# Patient Record
Sex: FEMALE | Race: WHITE | Employment: OTHER | URBAN - NONMETROPOLITAN AREA
[De-identification: names, ages, dates, MRNs, and addresses within clinical notes are randomized per-mention and may not be internally consistent; named-entity substitution may affect disease eponyms.]

---

## 2017-01-04 ENCOUNTER — TELEPHONE (OUTPATIENT)
Dept: UROLOGY | Age: 57
End: 2017-01-04

## 2017-01-17 ENCOUNTER — TELEPHONE (OUTPATIENT)
Dept: UROLOGY | Age: 57
End: 2017-01-17

## 2017-06-01 ENCOUNTER — OFFICE VISIT (OUTPATIENT)
Dept: UROLOGY | Age: 57
End: 2017-06-01

## 2017-06-01 VITALS
HEIGHT: 61 IN | BODY MASS INDEX: 25.49 KG/M2 | SYSTOLIC BLOOD PRESSURE: 106 MMHG | WEIGHT: 135 LBS | DIASTOLIC BLOOD PRESSURE: 70 MMHG

## 2017-06-01 DIAGNOSIS — N20.0 KIDNEY STONES: Primary | ICD-10-CM

## 2017-06-01 LAB
BILIRUBIN URINE: NEGATIVE
BLOOD URINE, POC: NORMAL
CHARACTER, URINE: CLEAR
COLOR, URINE: YELLOW
GLUCOSE URINE: NEGATIVE MG/DL
KETONES, URINE: NEGATIVE
LEUKOCYTE CLUMPS, URINE: NEGATIVE
NITRITE, URINE: NEGATIVE
PH, URINE: 6.5
PROTEIN, URINE: NEGATIVE MG/DL
SPECIFIC GRAVITY, URINE: 1.01 (ref 1–1.03)
UROBILINOGEN, URINE: 0.2 EU/DL

## 2017-06-01 PROCEDURE — 99999 EKG 12-LEAD: CPT | Performed by: PHYSICIAN ASSISTANT

## 2017-06-01 PROCEDURE — 99213 OFFICE O/P EST LOW 20 MIN: CPT | Performed by: PHYSICIAN ASSISTANT

## 2017-06-01 PROCEDURE — 81003 URINALYSIS AUTO W/O SCOPE: CPT | Performed by: PHYSICIAN ASSISTANT

## 2017-06-01 PROCEDURE — 99999 POCT URINALYSIS DIPSTICK W/O MICROSCOPE (AUTO): CPT | Performed by: PHYSICIAN ASSISTANT

## 2017-06-01 RX ORDER — CIPROFLOXACIN 500 MG/1
500 TABLET, FILM COATED ORAL 2 TIMES DAILY
COMMUNITY
End: 2020-08-12

## 2017-06-02 LAB
APPEARANCE: CLEAR
BACTERIA: ABNORMAL PER HPF
BILIRUBIN: NEGATIVE
COLOR: YELLOW
EPITHELIAL CELLS: ABNORMAL PER HPF
GLUCOSE BLD-MCNC: NEGATIVE MG/DL
KETONES, URINE: NEGATIVE
LEUKOCYTE ESTERASE, URINE: NEGATIVE
NITRITE, URINE: NEGATIVE
OCCULT BLOOD,URINE: ABNORMAL
PH: 6.5 (ref 5–9)
PROTEIN, URINE: NEGATIVE
RBC: ABNORMAL PER HPF (ref 0–5)
SP GRAVITY MISCELLANEOUS: <1.005 (ref 1–1.03)
UROBILINOGEN, URINE: NORMAL
WBC: ABNORMAL PER HPF (ref 0–5)

## 2017-06-05 ENCOUNTER — TELEPHONE (OUTPATIENT)
Dept: UROLOGY | Age: 57
End: 2017-06-05

## 2017-06-12 ENCOUNTER — PROCEDURE VISIT (OUTPATIENT)
Dept: UROLOGY | Age: 57
End: 2017-06-12

## 2017-06-12 VITALS
HEIGHT: 63 IN | DIASTOLIC BLOOD PRESSURE: 82 MMHG | SYSTOLIC BLOOD PRESSURE: 126 MMHG | WEIGHT: 134 LBS | BODY MASS INDEX: 23.74 KG/M2

## 2017-06-12 DIAGNOSIS — N20.0 RENAL STONES: ICD-10-CM

## 2017-06-12 DIAGNOSIS — N20.1 URETERAL STONE: Primary | ICD-10-CM

## 2017-06-12 LAB
BILIRUBIN URINE: NEGATIVE
BLOOD URINE, POC: ABNORMAL
CHARACTER, URINE: CLEAR
COLOR, URINE: YELLOW
GLUCOSE URINE: NEGATIVE MG/DL
KETONES, URINE: NEGATIVE
LEUKOCYTE CLUMPS, URINE: ABNORMAL
NITRITE, URINE: NEGATIVE
PH, URINE: 6
PROTEIN, URINE: 30 MG/DL
SPECIFIC GRAVITY, URINE: 1.02 (ref 1–1.03)
UROBILINOGEN, URINE: 0.2 EU/DL

## 2017-06-12 PROCEDURE — 99999 PR OFFICE/OUTPT VISIT,PROCEDURE ONLY: CPT | Performed by: UROLOGY

## 2017-06-12 PROCEDURE — 52310 CYSTOSCOPY AND TREATMENT: CPT | Performed by: UROLOGY

## 2017-06-12 PROCEDURE — 81003 URINALYSIS AUTO W/O SCOPE: CPT | Performed by: UROLOGY

## 2017-06-12 ASSESSMENT — ENCOUNTER SYMPTOMS
COLOR CHANGE: 0
EYE ITCHING: 0
NAUSEA: 0
EYE PAIN: 0
CHEST TIGHTNESS: 0
SHORTNESS OF BREATH: 0
ABDOMINAL PAIN: 0
DIARRHEA: 0
BACK PAIN: 0

## 2018-07-25 ENCOUNTER — HOSPITAL ENCOUNTER (OUTPATIENT)
Dept: CT IMAGING | Age: 58
Discharge: HOME | End: 2018-07-25
Payer: COMMERCIAL

## 2018-07-25 DIAGNOSIS — Z87.442 HISTORY OF KIDNEY STONES: ICD-10-CM

## 2018-07-25 DIAGNOSIS — R10.32 LEFT LOWER QUADRANT ABDOMINAL PAIN OF UNKNOWN ETIOLOGY: ICD-10-CM

## 2018-07-25 DIAGNOSIS — M54.50 ACUTE BILATERAL LOW BACK PAIN WITHOUT SCIATICA: ICD-10-CM

## 2018-07-25 DIAGNOSIS — R31.9 HEMATURIA, UNSPECIFIED TYPE: ICD-10-CM

## 2018-07-25 LAB — POC CREATININE WHOLE BLOOD: 0.6 MG/DL (ref 0.5–1.2)

## 2018-07-25 PROCEDURE — 82565 ASSAY OF CREATININE: CPT

## 2018-07-25 PROCEDURE — 74178 CT ABD&PLV WO CNTR FLWD CNTR: CPT

## 2018-07-25 PROCEDURE — 6360000004 HC RX CONTRAST MEDICATION: Performed by: NURSE PRACTITIONER

## 2018-07-25 RX ADMIN — IOPAMIDOL 85 ML: 755 INJECTION, SOLUTION INTRAVENOUS at 13:05

## 2018-07-25 RX ADMIN — IOHEXOL 50 ML: 240 INJECTION, SOLUTION INTRATHECAL; INTRAVASCULAR; INTRAVENOUS; ORAL at 13:05

## 2018-07-27 ENCOUNTER — HOSPITAL ENCOUNTER (EMERGENCY)
Age: 58
Discharge: HOME | End: 2018-07-27
Payer: COMMERCIAL

## 2018-07-27 ENCOUNTER — APPOINTMENT (OUTPATIENT)
Dept: GENERAL RADIOLOGY | Age: 58
End: 2018-07-27
Payer: COMMERCIAL

## 2018-07-27 VITALS
WEIGHT: 134 LBS | DIASTOLIC BLOOD PRESSURE: 78 MMHG | SYSTOLIC BLOOD PRESSURE: 102 MMHG | OXYGEN SATURATION: 98 % | TEMPERATURE: 98.3 F | RESPIRATION RATE: 16 BRPM | HEIGHT: 61 IN | HEART RATE: 65 BPM | BODY MASS INDEX: 25.3 KG/M2

## 2018-07-27 DIAGNOSIS — R00.2 PALPITATIONS: Primary | ICD-10-CM

## 2018-07-27 LAB
ANION GAP SERPL CALCULATED.3IONS-SCNC: 16 MEQ/L (ref 8–16)
BASOPHILS # BLD: 0.5 %
BASOPHILS ABSOLUTE: 0 THOU/MM3 (ref 0–0.1)
BILIRUBIN URINE: NEGATIVE
BLOOD, URINE: NEGATIVE
BUN BLDV-MCNC: 15 MG/DL (ref 7–22)
CALCIUM SERPL-MCNC: 10 MG/DL (ref 8.5–10.5)
CHARACTER, URINE: CLEAR
CHLORIDE BLD-SCNC: 98 MEQ/L (ref 98–111)
CO2: 25 MEQ/L (ref 23–33)
COLOR: YELLOW
CREAT SERPL-MCNC: 0.6 MG/DL (ref 0.4–1.2)
EKG ATRIAL RATE: 75 BPM
EKG P AXIS: 65 DEGREES
EKG P-R INTERVAL: 146 MS
EKG Q-T INTERVAL: 384 MS
EKG QRS DURATION: 92 MS
EKG QTC CALCULATION (BAZETT): 428 MS
EKG R AXIS: 71 DEGREES
EKG T AXIS: 46 DEGREES
EKG VENTRICULAR RATE: 75 BPM
EOSINOPHIL # BLD: 0.6 %
EOSINOPHILS ABSOLUTE: 0 THOU/MM3 (ref 0–0.4)
ERYTHROCYTE [DISTWIDTH] IN BLOOD BY AUTOMATED COUNT: 11.9 % (ref 11.5–14.5)
ERYTHROCYTE [DISTWIDTH] IN BLOOD BY AUTOMATED COUNT: 40.9 FL (ref 35–45)
GFR SERPL CREATININE-BSD FRML MDRD: > 90 ML/MIN/1.73M2
GLUCOSE BLD-MCNC: 111 MG/DL (ref 70–108)
GLUCOSE URINE: NEGATIVE MG/DL
HCT VFR BLD CALC: 40.1 % (ref 37–47)
HEMOGLOBIN: 13.7 GM/DL (ref 12–16)
IMMATURE GRANS (ABS): 0.02 THOU/MM3 (ref 0–0.07)
IMMATURE GRANULOCYTES: 0.2 %
KETONES, URINE: 15
LEUKOCYTE ESTERASE, URINE: NEGATIVE
LYMPHOCYTES # BLD: 17.6 %
LYMPHOCYTES ABSOLUTE: 1.5 THOU/MM3 (ref 1–4.8)
MCH RBC QN AUTO: 32.2 PG (ref 26–33)
MCHC RBC AUTO-ENTMCNC: 34.2 GM/DL (ref 32.2–35.5)
MCV RBC AUTO: 94.1 FL (ref 81–99)
MONOCYTES # BLD: 6.6 %
MONOCYTES ABSOLUTE: 0.5 THOU/MM3 (ref 0.4–1.3)
NITRITE, URINE: NEGATIVE
NUCLEATED RED BLOOD CELLS: 0 /100 WBC
OSMOLALITY CALCULATION: 279.1 MOSMOL/KG (ref 275–300)
PH UA: 6.5
PLATELET # BLD: 314 THOU/MM3 (ref 130–400)
PMV BLD AUTO: 9.1 FL (ref 9.4–12.4)
POTASSIUM SERPL-SCNC: 4.3 MEQ/L (ref 3.5–5.2)
PROTEIN UA: NEGATIVE
RBC # BLD: 4.26 MILL/MM3 (ref 4.2–5.4)
SEG NEUTROPHILS: 74.5 %
SEGMENTED NEUTROPHILS ABSOLUTE COUNT: 6.2 THOU/MM3 (ref 1.8–7.7)
SODIUM BLD-SCNC: 139 MEQ/L (ref 135–145)
SPECIFIC GRAVITY, URINE: 1.02 (ref 1–1.03)
TROPONIN T: < 0.01 NG/ML
TROPONIN T: < 0.01 NG/ML
TSH SERPL DL<=0.05 MIU/L-ACNC: 2.78 UIU/ML (ref 0.4–4.2)
UROBILINOGEN, URINE: 1 EU/DL
WBC # BLD: 8.3 THOU/MM3 (ref 4.8–10.8)

## 2018-07-27 PROCEDURE — 99285 EMERGENCY DEPT VISIT HI MDM: CPT

## 2018-07-27 PROCEDURE — 85025 COMPLETE CBC W/AUTO DIFF WBC: CPT

## 2018-07-27 PROCEDURE — 84484 ASSAY OF TROPONIN QUANT: CPT

## 2018-07-27 PROCEDURE — 93225 XTRNL ECG REC<48 HRS REC: CPT

## 2018-07-27 PROCEDURE — 80048 BASIC METABOLIC PNL TOTAL CA: CPT

## 2018-07-27 PROCEDURE — 93226 XTRNL ECG REC<48 HR SCAN A/R: CPT

## 2018-07-27 PROCEDURE — 93005 ELECTROCARDIOGRAM TRACING: CPT | Performed by: PHYSICIAN ASSISTANT

## 2018-07-27 PROCEDURE — 71046 X-RAY EXAM CHEST 2 VIEWS: CPT

## 2018-07-27 PROCEDURE — 6370000000 HC RX 637 (ALT 250 FOR IP): Performed by: PHYSICIAN ASSISTANT

## 2018-07-27 PROCEDURE — 93010 ELECTROCARDIOGRAM REPORT: CPT | Performed by: INTERNAL MEDICINE

## 2018-07-27 PROCEDURE — 36415 COLL VENOUS BLD VENIPUNCTURE: CPT

## 2018-07-27 PROCEDURE — 81003 URINALYSIS AUTO W/O SCOPE: CPT

## 2018-07-27 PROCEDURE — 84443 ASSAY THYROID STIM HORMONE: CPT

## 2018-07-27 RX ORDER — IPRATROPIUM BROMIDE AND ALBUTEROL SULFATE 2.5; .5 MG/3ML; MG/3ML
1 SOLUTION RESPIRATORY (INHALATION) ONCE
Status: DISCONTINUED | OUTPATIENT
Start: 2018-07-27 | End: 2018-07-28 | Stop reason: HOSPADM

## 2018-07-27 RX ORDER — CEFDINIR 300 MG/1
300 CAPSULE ORAL 2 TIMES DAILY
Qty: 20 CAPSULE | Refills: 0 | Status: SHIPPED | OUTPATIENT
Start: 2018-07-27 | End: 2018-07-31 | Stop reason: ALTCHOICE

## 2018-07-27 RX ORDER — ONDANSETRON 4 MG/1
4 TABLET, ORALLY DISINTEGRATING ORAL ONCE
Status: DISCONTINUED | OUTPATIENT
Start: 2018-07-27 | End: 2018-07-28 | Stop reason: HOSPADM

## 2018-07-27 RX ORDER — DICYCLOMINE HYDROCHLORIDE 10 MG/1
10 CAPSULE ORAL EVERY 6 HOURS PRN
Qty: 20 CAPSULE | Refills: 0 | Status: SHIPPED | OUTPATIENT
Start: 2018-07-27 | End: 2018-07-31 | Stop reason: ALTCHOICE

## 2018-07-27 RX ORDER — ONDANSETRON 4 MG/1
4 TABLET, ORALLY DISINTEGRATING ORAL EVERY 8 HOURS PRN
Qty: 20 TABLET | Refills: 0 | Status: SHIPPED | OUTPATIENT
Start: 2018-07-27 | End: 2018-07-31 | Stop reason: ALTCHOICE

## 2018-07-27 RX ORDER — ASPIRIN 81 MG/1
324 TABLET, CHEWABLE ORAL ONCE
Status: COMPLETED | OUTPATIENT
Start: 2018-07-27 | End: 2018-07-27

## 2018-07-27 RX ADMIN — ASPIRIN 324 MG: 81 TABLET, CHEWABLE ORAL at 18:43

## 2018-07-27 ASSESSMENT — ENCOUNTER SYMPTOMS
SHORTNESS OF BREATH: 1
EYE REDNESS: 0
ABDOMINAL PAIN: 0
EYE DISCHARGE: 0
VOMITING: 0
BACK PAIN: 0
NAUSEA: 0
WHEEZING: 0
SORE THROAT: 0
DIARRHEA: 0
CONSTIPATION: 0
RHINORRHEA: 0
COUGH: 0
COLOR CHANGE: 0

## 2018-07-27 NOTE — ED TRIAGE NOTES
Pt to ed with c/o palpitations, sob that have increased today. ekg obtained and vs assessed and stable. Pt currently denies pain and is waiting on a provider.

## 2018-07-27 NOTE — ED PROVIDER NOTES
[facility]  eMERGENCY dEPARTMENT eNCOUnter          [address] [facility]       Chief Complaint   Patient presents with    Shortness of Breath    Chest Pain     palpatations    Emesis       Nurses Notes reviewed and I agree except as noted in the HPI. HISTORY OF PRESENT ILLNESS    Marlo Perez is a 59 y.o. female who presents to the Emergency Department for the evaluation of palpitations. Patient states that she was seen in the ED on 7/25/18 and was diagnosed with diverticulitis and 2 kidney stones on the left side. She states that she was prescribed Cipro and Flagyl. She states that she began taking these medications the evening of her discharge. Yesterday, she states that she began feeling intermittent episodes palpitations that would last for periods of 5 to15 minutes. During these episodes of palpitations, she states that she feels light-headed and minimal if any shortness of breath. She states that she thinks her palpitations are due to her recent antibiotics. She denies chest pain, smoking, fever, chills, abdominal pain, nausea, vomiting, lightheadedness, dizziness, or LOC. She denies a personal or family history of heart disease. Patient denies further complaints at time of initial encounter. The HPI was provided by the patient. REVIEW OF SYSTEMS     Review of Systems   Constitutional: Negative for chills, fatigue and fever. HENT: Negative for congestion, ear pain, rhinorrhea and sore throat. Eyes: Negative for discharge and redness. Respiratory: Positive for shortness of breath (minimal if any). Negative for cough and wheezing. Cardiovascular: Positive for palpitations (5-15 minute intermittent episodes). Negative for chest pain. Gastrointestinal: Negative for abdominal pain, constipation, diarrhea, nausea and vomiting. Genitourinary: Negative for difficulty urinating, dysuria and vaginal discharge.    Musculoskeletal: Negative for arthralgias, back pain, . She indicated that her paternal grandmother is . She indicated that her paternal grandfather is . family history includes Cancer in her father and maternal grandmother; Diabetes in her maternal grandmother; Heart Disease in her sister; High Blood Pressure in her father and mother; Other in her maternal grandfather, mother, sister, sister, sister, and sister. SOCIAL HISTORY      reports that she quit smoking about 21 years ago. Her smoking use included Cigarettes. She has a 20.00 pack-year smoking history. She has never used smokeless tobacco. She reports that she does not drink alcohol or use drugs. PHYSICAL EXAM     INITIAL VITALS:  height is 5' 1\" (1.549 m) and weight is 134 lb (60.8 kg). Her oral temperature is 98.3 °F (36.8 °C). Her blood pressure is 102/78 and her pulse is 65. Her respiration is 16 and oxygen saturation is 98%. Physical Exam   Constitutional: She is oriented to person, place, and time. She appears well-developed and well-nourished. No distress. HENT:   Head: Normocephalic and atraumatic. Right Ear: External ear normal.   Left Ear: External ear normal.   Nose: Nose normal.   Mouth/Throat: Oropharynx is clear and moist.   Eyes: Conjunctivae and EOM are normal. Pupils are equal, round, and reactive to light. Right eye exhibits no discharge. Left eye exhibits no discharge. No scleral icterus. Neck: Normal range of motion. Neck supple. Cardiovascular: Normal rate, regular rhythm and normal heart sounds. Exam reveals no gallop and no friction rub. No murmur heard. Pulmonary/Chest: Effort normal and breath sounds normal. No respiratory distress. She has no wheezes. She has no rales. She exhibits no tenderness. Abdominal: Soft. Bowel sounds are normal. She exhibits no distension and no mass. There is no tenderness. There is no rebound and no guarding. Musculoskeletal: Normal range of motion. She exhibits no edema.    Lymphadenopathy:     She has no

## 2018-07-28 NOTE — ED NOTES
EKG contacted and advised they will check on holter monitor and be down. Patient and  notified.       Luly Barry RN  07/27/18 4602

## 2018-07-28 NOTE — ED NOTES
Patient resting in bed watching television. Respirations easy and unlabored. Patient continues to report intermittent palpitations. Lights dimmed and warm blanket offered for patient comfort. Patient and  updated that Rafiq Watts will place order for holter monitor. Patient verbalized understanding. No additional needs voiced at this time.       Geovani Desouza RN  07/27/18 [id number]

## 2018-07-31 ENCOUNTER — OFFICE VISIT (OUTPATIENT)
Dept: CARDIOLOGY CLINIC | Age: 58
End: 2018-07-31
Payer: COMMERCIAL

## 2018-07-31 VITALS
BODY MASS INDEX: 25.53 KG/M2 | WEIGHT: 135.2 LBS | HEART RATE: 91 BPM | SYSTOLIC BLOOD PRESSURE: 119 MMHG | HEIGHT: 61 IN | DIASTOLIC BLOOD PRESSURE: 88 MMHG

## 2018-07-31 DIAGNOSIS — R00.2 PALPITATIONS: Primary | ICD-10-CM

## 2018-07-31 PROCEDURE — 99204 OFFICE O/P NEW MOD 45 MIN: CPT | Performed by: INTERNAL MEDICINE

## 2018-07-31 ASSESSMENT — ENCOUNTER SYMPTOMS
BOWEL INCONTINENCE: 0
BACK PAIN: 0
EYE PAIN: 0
SPUTUM PRODUCTION: 0
COUGH: 0
HEMOPTYSIS: 0
BLOATING: 0
ABDOMINAL PAIN: 0
DOUBLE VISION: 0
BLURRED VISION: 0
CHANGE IN BOWEL HABIT: 0
DIARRHEA: 0
ORTHOPNEA: 0
EYE DISCHARGE: 0
SHORTNESS OF BREATH: 0
HOARSE VOICE: 0
CONSTIPATION: 0

## 2018-07-31 NOTE — PROGRESS NOTES
TRIG, HDL, LDLCALC, LDLDIRECT, LABVLDL    Lab Results   Component Value Date    TSH 2.780 07/27/2018         Testing Reviewed:      I have individually reviewed the below cardiac tests    EKG:SR, incomplete RBBB    ECHO: No results found for this or any previous visit. Assessment/Plan       Diagnosis Orders   1. Palpitations         Palpitations  Former smoker  Hypothyrodism    Telemetry in ER for didn't show any arrhythmias  Holter for 48 hrs showed no arrhythmias and the symptoms that she documented did not correlate with holter findings  Patient denies all other cardiac symptoms  No more episodes of palpitations  Report good exercise toelrance  No further workup necessary  Advised patient to call office or seek immediate medical attention if there is any new onset of  any chest pain, sob, palpitations, lightheadedness, dizziness, orthopnea, PND or pedal edema. The patient is asked to make an attempt to improve diet and exercise patterns to aid in medical management of this problem. Thank you for allowing me to participate in the care of this patient. Please do not hesitate to contact me for any further questions. Return in about 1 year (around 7/31/2019), or if symptoms worsen or fail to improve, for Regular follow up, Review testing.        Electronically signed by Kaitlyn Shi MD [address]  7/31/2018 at 11:53 AM

## 2018-08-02 ENCOUNTER — TELEPHONE (OUTPATIENT)
Dept: UROLOGY | Age: 58
End: 2018-08-02

## 2018-08-02 NOTE — TELEPHONE ENCOUNTER
Patient called in and was informing us that she had a CT abd/pelvis done recently. She said that her PCP had her notify us as they were thinking the one may be too large to pass. Could you review the imaging? Does she need to make an appointment? Please advise. Thank you.

## 2018-09-20 ENCOUNTER — TELEPHONE (OUTPATIENT)
Dept: UROLOGY | Age: 58
End: 2018-09-20

## 2018-09-20 NOTE — TELEPHONE ENCOUNTER
Patient calling in requesting a copy of her stone analysis from June 2017. Address was verified and results were sent.

## 2019-03-06 ENCOUNTER — TELEPHONE (OUTPATIENT)
Dept: UROLOGY | Age: 59
End: 2019-03-06

## 2020-07-31 ENCOUNTER — HOSPITAL ENCOUNTER (OUTPATIENT)
Dept: WOMENS IMAGING | Age: 60
Discharge: HOME | End: 2020-07-31
Payer: COMMERCIAL

## 2020-07-31 PROCEDURE — 76642 ULTRASOUND BREAST LIMITED: CPT

## 2020-07-31 PROCEDURE — G0279 TOMOSYNTHESIS, MAMMO: HCPCS

## 2020-08-04 ENCOUNTER — HOSPITAL ENCOUNTER (OUTPATIENT)
Dept: WOMENS IMAGING | Age: 60
Discharge: HOME | End: 2020-08-04
Payer: COMMERCIAL

## 2020-08-04 PROCEDURE — 88305 TISSUE EXAM BY PATHOLOGIST: CPT

## 2020-08-04 PROCEDURE — G0279 TOMOSYNTHESIS, MAMMO: HCPCS

## 2020-08-04 PROCEDURE — 2709999900 MAM US GUID NDL BIOPSY RIGHT

## 2020-08-04 PROCEDURE — 88377 M/PHMTRC ALYS ISHQUANT/SEMIQ: CPT

## 2020-08-04 PROCEDURE — 88360 TUMOR IMMUNOHISTOCHEM/MANUAL: CPT

## 2020-08-04 NOTE — PROGRESS NOTES
Formulation and discussion of sedation / procedure plans, risks, benefits, side effects and alternatives with patient and/or responsible adult completed.     Electronically signed by Nigel Cabrales MD on 8/4/2020 at 10:40 AM

## 2020-08-04 NOTE — PROGRESS NOTES
[facility]  Pre-Biopsy Assessment      Patient Education    Written information about procedure Yes  right   Procedural steps explained Yes Ultrasound Biopsy   Post-op potential: bruising, hematoma, pain Yes    Self-care: activity, care of dressing Yes    Patient verbalized understanding Yes    Consent signed and witnessed Yes      Hormone Therapy Status: none    Recent Medication: N/A Last Dose: n/a                                     Hormone Replacement Therapy: no  - did take oral contraceptives 10 years, last used age 26    Previous Breast Biopsy: no    Previous Diagnosis Cancer: no    Hysterectomy:no    Emotional Status: Nervous    Language or Physical Barriers: none    Comments: has pain in the area we biopsied, this is what brought her in for diagnostic mammogram as well.         Electronically signed by Kavon Gerber on 8/4/2020 at 11:15 AM

## 2020-08-04 NOTE — PROGRESS NOTES
Breast Biopsy Flowsheet/Post-Operative Care    Date of Procedure: 8/4/2020  Physician: Dr. Aleah Sheridan  Technologist: Nirmala Monahan RT(R)(M)    Biopsy:ultrasound guided breast biopsy  Lesion type: Non-palpable  Breast: right    Clock face position: Site #1: Axillary tail        Primary Method of Detection: Mammogram / patient has pain in this area, not palpable      Microcalcification's: no   Distribution: N/A    Asymmetry: asymmetric    Biopsy Method:   Sertera:    Site # 1    Gauge: 14    # of Passes: 4     Clip: Marjan     Pre-Op Assessment: (BI-RADS)   5.  Highly Suggestive of Malignancy    Patient Tolerated Procedure: good  Complications: none  Comments: pain at area of biopsy site, but had pain in this area before the biopsy as well    Post Operative Care  Steri strips: Yes  Dressing: Gauze, Tape   Ice Applied to Site:  Yes  Evidence of Bleeding:  No    Pain Verbalized: Yes    Written Discharge Instructions: Yes  Condition at Discharge: good  Time of Discharge: 1120    Electronically signed by Clair Gan on 8/4/2020 at 11:18 AM

## 2020-08-06 ENCOUNTER — CLINICAL DOCUMENTATION (OUTPATIENT)
Dept: WOMENS IMAGING | Age: 60
End: 2020-08-06

## 2020-08-06 ENCOUNTER — CLINICAL DOCUMENTATION (OUTPATIENT)
Dept: ONCOLOGY | Age: 60
End: 2020-08-06

## 2020-08-06 NOTE — PROGRESS NOTES
Contact Type: [facility]    Diagnosis: Breast-malignant, IDC with DCIS    Home Disposition: Lives with     Contact Information: Arrived with her  for biopsy results. Dr. Cliff Rosenbaum discussed pathology and recommended breast clinic. Encouraged Breast Clinic attendance and informed that Breast Navigators will give appt. information. All cards given for surgeons and oncologists. Requests Referral to Doctor(s) with appointment(s): she is going to talk to her sister and make a decision soon    Additional Referral(s): Tanner Mcguire/Karen Ceballos: [facility],        Biopsy site status: doing well    Teaching Sheets provided: Cancer Type: IDC, Breast Cancer,Lump/Mast, Tumor Markers,Sentinal node, Needle loc,  Navigation Packet, Nurse Navigation contact information, Breast Clinic appt and map     Currently on HRT: no      Notes: Explained terms doctor and navigators will discuss at next appointments. Questions addressed and encouraged patient to ask Breast [facility]. Will receive call within 24 hrs. Referral faxed to Navigation.     Answered yes on Genetic Risk Assessment: yes, pancreatic maternal gma in 80's, genetic form faxed to nurse navigators     Additional information: Patient is retired from [employer]

## 2020-08-06 NOTE — PROGRESS NOTES
Oncology Navigation- Initial Note: Teaching    Intake-  Contact Type: Cancer Center-teaching with navigator    Diagnosis: Breast-malignant    Home Disposition: Lives with other who is able to assist,  Gi Lopez. Has 2 daughters age 27 and 28. Patient needs and barriers to care: Coordination of Care, Knowledge deficit and Emotional Issues/ Fear/ Anxiety,     Referral Source: Outside Provider    Receptive to Advanced Care Planning/ Palliative Care:  NA stage 1A     Interventions-   General Interventions: Arrived with  for initial breast cancer teaching. Genetics/Family Hx: No family history of breast, ovarian or colon cancer. Maternal grandmother had  pancreatic cancer in her [de-identified]. Genetic testing discussed. Patient unsure if she will have it done. Education/Screenings:  yes -  Breast cancer information packet and navigation welcome packet reviewed. Printed material provided and reviewed on invasive ductal carcinoma, needle loc, sentinel node, post-op exercises to be started when instructed by surgeon, and lymphedema symptoms and prevention. Pillow for post-op provided. Currently on HRT: no, natural menopause at age 35. Referrals: Financial Counselors, spiritual care, genetics recommended     Continuum of Care: Diagnosis/Active Treatment    Notes: Printed material provided and reviewed for above stated education. Teaching completed, questions addressed, emotional support provided. Contact information provided and patient encouraged to call with any questions or concerns. Will follow through continuum of care.     Electronically signed by Medardo Stroud RN on 8/6/2020 at 4:35 PM

## 2020-08-07 ENCOUNTER — CLINICAL DOCUMENTATION (OUTPATIENT)
Dept: ONCOLOGY | Age: 60
End: 2020-08-07

## 2020-08-07 ENCOUNTER — CLINICAL DOCUMENTATION (OUTPATIENT)
Dept: PHYSICAL THERAPY | Age: 60
End: 2020-08-07

## 2020-08-07 NOTE — PROGRESS NOTES
Name: Sukhdev Torrez  : 1960  MRN: [id number]    Oncology Navigation Follow-Up Note      Contact Type: [facility]    Interventions-   General Interventions: Patient and  arrived to breast clinic. Treatment plan discussed with breast team, Juju Davis, María Elena aguero. Questions addressed. Emotional support given. Education/Screenings: Breast Clinic Recommendation form completed, discussed, and copy given to patient. Referrals: Oncology Rehab, Elsie Joshi, for baseline arm measurements. Referral made to Dr. Meli Tran, office will call patient. Patient would like to think about surgeon choice over the weekend. She will call 8/10/20 with choice and appointment will be made. Referral made to Lolita To for genetic eval.        Continuum of Care: Diagnosis/Active Treatment    Note: Will continue to follow through continuum of care. Refer to Recommendation form under media tab for further information.     Electronically signed by Shyam Berry RN on 2020 at 11:46 AM

## 2020-08-10 LAB — MISC. #1 REFERENCE GROUP TEST: NORMAL

## 2020-08-12 ENCOUNTER — HOSPITAL ENCOUNTER (OUTPATIENT)
Age: 60
Discharge: HOME | End: 2020-08-12
Payer: COMMERCIAL

## 2020-08-12 ENCOUNTER — OFFICE VISIT (OUTPATIENT)
Dept: SURGERY | Age: 60
End: 2020-08-12
Payer: COMMERCIAL

## 2020-08-12 ENCOUNTER — TELEPHONE (OUTPATIENT)
Dept: SURGERY | Age: 60
End: 2020-08-12

## 2020-08-12 VITALS
SYSTOLIC BLOOD PRESSURE: 124 MMHG | DIASTOLIC BLOOD PRESSURE: 70 MMHG | BODY MASS INDEX: 25.3 KG/M2 | HEIGHT: 61 IN | HEART RATE: 84 BPM | RESPIRATION RATE: 20 BRPM | OXYGEN SATURATION: 99 % | TEMPERATURE: 97.4 F | WEIGHT: 134 LBS

## 2020-08-12 PROBLEM — C50.411 CARCINOMA OF UPPER-OUTER QUADRANT OF RIGHT BREAST IN FEMALE, ESTROGEN RECEPTOR NEGATIVE (HCC): Status: ACTIVE | Noted: 2020-08-12

## 2020-08-12 PROBLEM — Z17.1 CARCINOMA OF UPPER-OUTER QUADRANT OF RIGHT BREAST IN FEMALE, ESTROGEN RECEPTOR NEGATIVE (HCC): Status: ACTIVE | Noted: 2020-08-12

## 2020-08-12 LAB
HCT VFR BLD CALC: 43.9 % (ref 37–47)
HEMOGLOBIN: 14.3 GM/DL (ref 12–16)

## 2020-08-12 PROCEDURE — 85018 HEMOGLOBIN: CPT

## 2020-08-12 PROCEDURE — 85014 HEMATOCRIT: CPT

## 2020-08-12 PROCEDURE — U0003 INFECTIOUS AGENT DETECTION BY NUCLEIC ACID (DNA OR RNA); SEVERE ACUTE RESPIRATORY SYNDROME CORONAVIRUS 2 (SARS-COV-2) (CORONAVIRUS DISEASE [COVID-19]), AMPLIFIED PROBE TECHNIQUE, MAKING USE OF HIGH THROUGHPUT TECHNOLOGIES AS DESCRIBED BY CMS-2020-01-R: HCPCS

## 2020-08-12 PROCEDURE — 36415 COLL VENOUS BLD VENIPUNCTURE: CPT

## 2020-08-12 PROCEDURE — 99205 OFFICE O/P NEW HI 60 MIN: CPT | Performed by: SURGERY

## 2020-08-12 ASSESSMENT — ENCOUNTER SYMPTOMS
NAUSEA: 0
DIARRHEA: 0
WHEEZING: 0
APNEA: 0
EYE DISCHARGE: 0
COLOR CHANGE: 0
PHOTOPHOBIA: 0
EYE PAIN: 0
TROUBLE SWALLOWING: 0
RECTAL PAIN: 0
ABDOMINAL DISTENTION: 0
BACK PAIN: 0
ABDOMINAL PAIN: 0
VOICE CHANGE: 0
SINUS PRESSURE: 0
EYE ITCHING: 0
CHEST TIGHTNESS: 0
ANAL BLEEDING: 0
VOMITING: 0
SINUS PAIN: 0
SHORTNESS OF BREATH: 0
EYE REDNESS: 0
RHINORRHEA: 0
COUGH: 0
CHOKING: 0
FACIAL SWELLING: 0
BLOOD IN STOOL: 0
SORE THROAT: 0
STRIDOR: 0
CONSTIPATION: 0

## 2020-08-12 NOTE — LETTER
[address] [facility]  Chi Aguero MD FACS  Phone- [phone]  Fax [phone]    Pt Name: Maribeth Hutchinson  Medical Record Number: [id number]  Date of Birth 1960   Today's Date: 8/12/2020    Fran Marcus was evaluated in the office today. My assessment and plans are listed below. Assessment:     Florencio Navarro was seen today for surgical consult. Diagnoses and all orders for this visit:    Pre-op testing  -     Hemoglobin and Hematocrit, Blood; Future    Carcinoma of upper-outer quadrant of right breast in female, estrogen receptor negative ([facility] [address]  -     US PLACE BREAST LOC DEVICE 1ST LESION RIGHT; Future  -     TAMIKO STEREO PLACE BREAST LOC DEVICE 1ST LESION RIGHT; Future  -     LYMPHANGIOGRAPHY INJECTION FOR SENTINEL NODE IDENTIFICATION; Future  -     MASTECTOMY PARTIAL / LUMPECTOMY; Future  -     BIOPSY / EXCISION SENTINEL NODE DEEP AXILLARY; Future  -     COVID-19 Ambulatory; Future  -     Hemoglobin and Hematocrit, Blood; Future  -     Ambulatory Referral To Oncology Rehabilitation         Plan:  1. Schedule Florencio Navarro for  1.  US needle loc RIGHT by radiology  2. Sent injection RIGHT by me  3. Right lumpectomy and sln bx  2. In the office, I had a discussion with the patient regarding the treatment options for invasive breast cancer. We discussed lumpectomy and radiation versus mastectomy. We discussed the fact that there is no statistical difference in long term survival or recurrence between the two options. 3. Candidate for Lumpectomy YES/NO: Yes  4. Candidate for omission of sln bx YES/NO: No  5. Candidate for potential omission of radiation YES/NO: No  6. Status: outpatient  7. Planned anesthesia: MAC  8.  She will undergo pre-operative clearance per anesthesia guidelines with risk factors listed under the past medical history diagnosis & problem list.  13.  Perioperative discontinuation of        ASA, Plavix, warfarin, Brillinta, Effient, Pradaxa, Eliquis and Xarelto. Continuation of 81 mg Aspirin is acceptable. 14.  Perioperative medical clearance is not        required       The patient was counseled at length about the risks of enid Covid-19 during their perioperative period and any recovery window from their procedure. The patient was made aware that enid Covid-19  may worsen their prognosis for recovering from their procedure  and lend to a higher morbidity and/or mortality risk. All material risks, benefits, and reasonable alternatives including postponing the procedure were discussed. The patient does wish to proceed with the procedure at this time.       Orders Placed This Encounter:  Orders Placed This Encounter   Procedures    LYMPHANGIOGRAPHY INJECTION FOR SENTINEL NODE IDENTIFICATION     Standing Status:   Future     Standing Expiration Date:   8/12/2021     Order Specific Question:   Pre-procedure Diagnosis     Answer:   Breast Cancer     Order Specific Question:   Special needs     Answer:   Dr Carrington Mask to Perform    MASTECTOMY PARTIAL / LUMPECTOMY     Standing Status:   Future     Standing Expiration Date:   8/12/2021     Order Specific Question:   Pre-procedure Diagnosis     Answer:   Right Breast Cancer    BIOPSY / EXCISION SENTINEL NODE DEEP AXILLARY     Standing Status:   Future     Standing Expiration Date:   8/12/2021     Order Specific Question:   Pre-procedure Diagnosis     Answer:   RIGHT BREAST CANCER    US PLACE BREAST LOC DEVICE 1ST LESION RIGHT     Standing Status:   Future     Standing Expiration Date:   8/12/2021    TAMIKO STEREO PLACE BREAST LOC DEVICE 1ST LESION RIGHT     Standing Status:   Future     Standing Expiration Date:   10/12/2021    COVID-19 Ambulatory     Standing Status:   Future     Standing Expiration Date:   8/12/2021     Scheduling Instructions:      Saline media preferred given current shortage of viral transport media but both acceptable Order Specific Question:   Is this test for diagnosis or screening? Answer:   Screening     Order Specific Question:   Symptomatic for COVID-19 as defined by CDC? Answer:   No     Order Specific Question:   Reason for Test     Answer:   Upcoming elective surgery/procedure/delivery, return to work, or discharge to another facility     Order Specific Question:   Date of Symptom Onset     Answer:   N/A     Order Specific Question:   Hospitalized for COVID-19? Answer:   No     Order Specific Question:   Admitted to ICU for COVID-19? Answer:   No     Order Specific Question:   Employed in healthcare setting? Answer:   No     Order Specific Question:   Resident in a congregate (group) care setting? Answer:   No     Order Specific Question:   Pregnant? Answer:   No    Hemoglobin and Hematocrit, Blood     Standing Status:   Future     Standing Expiration Date:   8/12/2021    Ambulatory Referral To Oncology Rehabilitation     Referral Priority:   Routine     Referral Type:   Consult for Advice and Opinion     Referral Reason:   Specialty Services Required     Number of Visits Requested:   1                If I can provide any additional assistance or you have any concerns, please feel free to contact me. Thank you for allowing to participate in the care of your patients. Sincerely,      Chi Aguero MD FACS  [address]  [place], [address]  Office: [phone]  Fax: [phone]

## 2020-08-12 NOTE — PROGRESS NOTES
Subjective:      Patient ID: Kam Angulo is a 56 y.o. female. Chief Complaint   Patient presents with    Surgical Consult     New patient-referred by [facility] breast invasive ductal carcinoma       HPI    Review of Systems   Constitutional: Negative for activity change, appetite change, chills, diaphoresis, fatigue, fever and unexpected weight change. HENT: Negative for congestion, dental problem, drooling, ear discharge, ear pain, facial swelling, hearing loss, mouth sores, nosebleeds, postnasal drip, rhinorrhea, sinus pressure, sinus pain, sneezing, sore throat, tinnitus, trouble swallowing and voice change. Eyes: Negative for photophobia, pain, discharge, redness, itching and visual disturbance. Respiratory: Negative for apnea, cough, choking, chest tightness, shortness of breath, wheezing and stridor. Cardiovascular: Negative for chest pain, palpitations and leg swelling. Gastrointestinal: Negative for abdominal distention, abdominal pain, anal bleeding, blood in stool, constipation, diarrhea, nausea, rectal pain and vomiting. Genitourinary: Negative for decreased urine volume, difficulty urinating, dyspareunia, dysuria, enuresis, flank pain, frequency, genital sores, hematuria, menstrual problem, pelvic pain, urgency, vaginal bleeding, vaginal discharge and vaginal pain. Musculoskeletal: Negative for arthralgias, back pain, gait problem, joint swelling, myalgias, neck pain and neck stiffness. Skin: Negative for color change, pallor, rash and wound. Neurological: Negative for dizziness, tremors, seizures, syncope, facial asymmetry, speech difficulty, weakness, light-headedness, numbness and headaches. Hematological: Negative for adenopathy. Does not bruise/bleed easily. Psychiatric/Behavioral: Negative for agitation, behavioral problems, confusion, decreased concentration, dysphoric mood, hallucinations, self-injury, sleep disturbance and suicidal ideas.  The patient is not nervous/anxious and is not hyperactive.       /70 (Site: Right Upper Arm, Position: Sitting, Cuff Size: Medium Adult)   Pulse 84   Temp 97.4 °F (36.3 °C) (Temporal)   Resp 20   Ht 5' 1\" (1.549 m)   Wt 134 lb (60.8 kg)   SpO2 99%   BMI 25.32 kg/m²     Objective:   Physical Exam    Assessment:            Plan:              Nancy Duarte RN

## 2020-08-12 NOTE — PROGRESS NOTES
Georgina Baires MD  [facility]  General Surgery  New Patient Evaluation in Office  Pt Name: Melissa Salas  Date of Birth 1960   Today's Date: 8/12/2020  Medical Record Number: [id number]  Referring Provider: No ref. provider found  Primary Care Provider: Bravo Bergeron,   Chief Complaint   Patient presents with    Surgical Consult     New patient-referred by [facility] breast invasive ductal carcinoma     ASSESSMENT      Problem List Items Addressed This Visit     Carcinoma of upper-outer quadrant of right breast in female, estrogen receptor negative ([facility] [address]    Relevant Orders    US PLACE BREAST LOC DEVICE 1ST LESION RIGHT    TAMIKO STEREO PLACE BREAST LOC DEVICE 1ST LESION RIGHT    LYMPHANGIOGRAPHY INJECTION FOR SENTINEL NODE IDENTIFICATION    MASTECTOMY PARTIAL / LUMPECTOMY    BIOPSY / EXCISION SENTINEL NODE DEEP AXILLARY    COVID-19 Ambulatory    Hemoglobin and Hematocrit, Blood    Ambulatory Referral To Oncology Rehabilitation      Other Visit Diagnoses     Pre-op testing    -  Primary    Relevant Orders    Hemoglobin and Hematocrit, Blood        Past Medical History:   Diagnosis Date    Acquired autoimmune hypothyroidism     Breast cancer ([facility] [address] 08/2020    Diverticulitis     Dr. Hamida Marshall History of kidney stones     Intestinal metaplasia of gastric mucosa         Osteopenia 2006          Cancer Staging  Carcinoma of upper-outer quadrant of right breast in female, estrogen receptor negative ([facility] [address]  Staging form: Breast, AJCC 8th Edition  - Clinical stage from 8/12/2020: Stage IA (cT1b, cN0, cM0, G1, ER+, AR+, HER2-) - Signed by Georgina Baires MD on 8/12/2020     PLANS      1. Schedule Laura Wheeler for  1.  US needle loc RIGHT by radiology  2. Sent injection RIGHT by me  3. Right lumpectomy and sln bx  2. In the office, I had a discussion with the patient regarding the treatment options for invasive breast cancer. We discussed lumpectomy and radiation versus mastectomy.  We discussed the fact that there is no statistical difference in long term survival or recurrence between the two options. 3. Candidate for Lumpectomy YES/NO: Yes  4. Candidate for omission of sln bx YES/NO: No  5. Candidate for potential omission of radiation YES/NO: No  6. We had a long discussion in the office regarding treatment options for breast cancer. We discussed lumpectomy and radiation versus simple mastectomy with or without reconstruction. For lumpectomy, we discussed the conduct of the operation, types of anesthesia available, possible use of needle localization preop and expected postop recovery and cosmetic outcome. We also discussed the risk of recurrence after lumpectomy and radiation estimated to be 9-11% of which 50% of the recurrence is non-invasive breast cancer and the other 50% is invasive cancer. We covered radiation therapy, its typical course and average number of treatments required, as well as possible side effects. In addition, we discussed treatment if recurrence developed which is mastectomy. Finally we discussed the results of NSABP B-24 which showed a 50% reduction in local recurrence with the use of adjuvant tamoxifen taken for 5 years after radiation. We also covered simple mastectomy. The conduct of the operation, use of general anesthesia, the expected postop recovery and cosmetic outcome with and without reconstruction. The recurrence rate of 1% was estimated as the risk of recurrence. After this discussion, the patient's questions were answered and has decided to proceed with surgical intervention. 7. For lumpectomy, we covered the conduct of the operation, the possible use of needle localization preoperatively, the anesthetic options, the typical post op course and cosmetic outcome, and the complications including bleeding, infection, seroma, and reoperation for positive margins.   8. For mastectomy, we covered the conduct of the operation, the use of general anesthesia, the typical post op course and cosmetic outcome. We also covered reconstruction options both immediate and delayed and referral was made if the patient desired reconstruction, or the use of a bra prosthesis. We also covered the possible complications including bleeding, infection, skin slough, seroma formation and others. 9. We also discussed staging and the importance of lymph node sampling. We discussed the use of sentinel lymph node biopsy versus standard axillary lymph node dissection. We discussed the complications of axillary lymph node dissection and how the information is used in treatment decisions and prognosis. We discussed how the sentinel lymph node is identified and its significance. We discussed the possibility of not finding the sentinel node as well as a 5% false negative rate. 10.  We discussed the ACOSOG  Z011 trial where even if sln is positive, no benefit for further axillary surgery has been shown in lumpectomy in patients getting radiation. 11. We also covered adjuvant chemotherapy and radiation therapy if they would be required. After these discussions, the patient's questions were answered and has elected to proceed with surgery. 12. Her need for genetic testing referral was calculated by questions asked during her mammograms and she was is not deemed to be a candidate for testing. For those who are deemed appropriate, referral to the genetic counseling service at [address] [place] [address] was made. Olya Muller meets the following criteria for further genetic risk evaluation based on NCCN guidelines:   Breast Cancer diagnosed age 48 years or younger.  Triple-negative (ER-, LA-, HER2-) breast cancer diagnosed age 61 years or younger.    Two breast cancer primaries   Breast cancer at any age and   o 3 or more close blood relatives with:  - Breast cancer diagnosis at age 48 years or younger; or  - Invasive ovarian cancer; or  - Male breast cancer; or  - Pancreatic cancer; or  - High-grade ([place] score greater than or equal to 7) or metastatic prostate cancer. o 2 or more close blood relatives with breast cancer at any age.  Lobular breast cancer.  Diffuse gastric cancer   Breast cancer, gastrointestinal cancer, or hamartomatous polyps, ovarian sex chord tumors, pancreatic cancer, testicular sertoli cell tumors, or childhood skin pigmentation. 14. Status: outpatient  15. Planned anesthesia: MAC  16. She will undergo pre-operative clearance per anesthesia guidelines with risk factors listed under the past medical history diagnosis & problem list.  13.  Perioperative discontinuation of        ASA, Plavix, warfarin, Brillinta, Effient, Pradaxa, Eliquis and Xarelto. Continuation of 81 mg Aspirin is acceptable. 14.  Perioperative medical clearance is not        required       The patient was counseled at length about the risks of enid Covid-19 during their perioperative period and any recovery window from their procedure. The patient was made aware that enid Covid-19  may worsen their prognosis for recovering from their procedure  and lend to a higher morbidity and/or mortality risk. All material risks, benefits, and reasonable alternatives including postponing the procedure were discussed. The patient does wish to proceed with the procedure at this time.       Orders Placed This Encounter:  Orders Placed This Encounter   Procedures    LYMPHANGIOGRAPHY INJECTION FOR SENTINEL NODE IDENTIFICATION     Standing Status:   Future     Standing Expiration Date:   8/12/2021     Order Specific Question:   Pre-procedure Diagnosis     Answer:   Breast Cancer     Order Specific Question:   Special needs     Answer:   Dr Lai Gravely to Perform    MASTECTOMY PARTIAL / LUMPECTOMY     Standing Status:   Future     Standing Expiration Date:   8/12/2021     Order Specific Question:   Pre-procedure Diagnosis     Answer:   Right Breast Cancer    BIOPSY / EXCISION SENTINEL NODE DEEP AXILLARY     Standing the right UOQ. she is had core biopsy, right demonstrating a low grade invasive ductal cancer of the right breast. Estrogen receptor status is positive. Progesterone receptor status is positive. HER-2/deysi receptors stain 2+ by IHC. Prior to the biopsy she complained of breast pain in the right breast and denied galactorrhea, new or changing breast lumps, nipple changes and nipple discharge. Risk assessment: none. She has not been on previous estrogen therapy. Felt something in right axillary area. No results found.       Past Medical History  Past Medical History:   Diagnosis Date    Acquired autoimmune hypothyroidism     Breast cancer ([facility] [address] 2020    Diverticulitis     Dr. Pilar Gallegos History of kidney stones     Intestinal metaplasia of gastric mucosa         Osteopenia        Past Surgical History  Past Surgical History:   Procedure Laterality Date     SECTION  3797,0783    x2    COLONOSCOPY  2018    Dr. Kiara Roman Left 2017    With left ureteroscopy, basket retrieval of stone fragments, left ureterenoscopy, basket retrieval of renal stones, and left ureter stent placement-- [address]  [id number]    pelvic    [address] BIOPSY RIGHT  2020    [facility] [address] RIGHT 2020 [facility]    UPPER GASTROINTESTINAL ENDOSCOPY  2010      Family History  Family History   Problem Relation Age of Onset    Other Mother         fuches corneal dystrophy, scleroderma, osteoporosis    High Blood Pressure Mother         pulmonary htn    High Blood Pressure Father     Cancer Father         lung    Diabetes Maternal Grandmother     Cancer Maternal Grandmother         pancreatic    Other Maternal Grandfather         fuches corneal dystrophy    Other Sister         fuches corneal dystrophy    Other Sister         fuches corneal dystrophy    Heart Disease Sister         pacemaker    Other Sister         fuches corneal dystrophy, hypothyroidism    Other Sister         mulugeta florence     Cancer-related family history includes Cancer in her father and maternal grandmother. Medications  Outpatient Encounter Medications as of 2020   Medication Sig Dispense Refill    thyroid (ARMOUR THYROID) 15 MG tablet Take 15 mg by mouth daily Alternate every other day with 30 mg      [DISCONTINUED] ciprofloxacin (CIPRO) 500 MG tablet Take 500 mg by mouth 2 times daily      [DISCONTINUED] Cholecalciferol (VITAMIN D3) 1000 UNITS CAPS Take 1,000 Units by mouth 3 times daily       No facility-administered encounter medications on file as of 2020. Current Outpatient Medications   Medication Sig Dispense Refill    thyroid (ARMOUR THYROID) 15 MG tablet Take 15 mg by mouth daily Alternate every other day with 30 mg       No current facility-administered medications for this visit.       Allergies  Allergies   Allergen Reactions    Acetaminophen      Gi upset    Aspartame And Phenylalanine     Bextra [Valdecoxib]      urinary retention    Doxycycline      GI    Magnesium Citrate      Internal  Pain, nausea, vomiting, diarrhea    Ciprofloxacin Palpitations    Flagyl [Metronidazole] Palpitations    Neomycin Rash       Social History  Social History     Socioeconomic History    Marital status:      Spouse name: Not on file    Number of children: 2    Years of education: Not on file    Highest education level: Not on file   Occupational History    Not on file   Social Needs    Financial resource strain: Not on file    Food insecurity     Worry: Not on file     Inability: Not on file   [employer] needs     Medical: Not on file     Non-medical: Not on file   Tobacco Use    Smoking status: Former Smoker     Packs/day: 1.00     Years: 20.00     Pack years: 20.00     Types: Cigarettes     Last attempt to quit: 1997     Years since quittin.6    Smokeless tobacco: Never Used   Substance and Sexual Activity    tightness, shortness of breath, wheezing and stridor. Cardiovascular: Negative for chest pain, palpitations and leg swelling. Gastrointestinal: Negative for abdominal distention, abdominal pain, anal bleeding, blood in stool, constipation, diarrhea, nausea, rectal pain and vomiting. Genitourinary: Negative for decreased urine volume, difficulty urinating, dyspareunia, dysuria, enuresis, flank pain, frequency, genital sores, hematuria, menstrual problem, pelvic pain, urgency, vaginal bleeding, vaginal discharge and vaginal pain. Musculoskeletal: Negative for arthralgias, back pain, gait problem, joint swelling, myalgias, neck pain and neck stiffness. Skin: Negative for color change, pallor, rash and wound. Neurological: Negative for dizziness, tremors, seizures, syncope, facial asymmetry, speech difficulty, weakness, light-headedness, numbness and headaches. Hematological: Negative for adenopathy. Does not bruise/bleed easily. Psychiatric/Behavioral: Negative for agitation, behavioral problems, confusion, decreased concentration, dysphoric mood, hallucinations, self-injury, sleep disturbance and suicidal ideas. The patient is not nervous/anxious and is not hyperactive      OBJECTIVE    VITALS:  height is 5' 1\" (1.549 m) and weight is 134 lb (60.8 kg). Her temporal temperature is 97.4 °F (36.3 °C). Her blood pressure is 124/70 and her pulse is 84. Her respiration is 20 and oxygen saturation is 99%. CONSTITUTIONAL: Alert and oriented times 3, no acute distress and cooperative to examination with proper mood and affect. SKIN: Skin color, texture, turgor normal. No rashes or lesions. LYMPH: no cervical nodes, no supraclavicular nodes, no axillary nodes, the right axilla however is where the biopsy site is and where the lesion is so is difficult to say if the thickening is the lesion biopsy site changes unlikely to be lymphadenopathy  HEENT: Head is normocephalic, atraumatic. EOMI, PERRLA.   NECK: Supple, symmetrical, trachea midline, no adenopathy, thyroid symmetric, not enlarged and no tenderness, skin normal.   BREAST: Right breast is examined there is a biopsy site in the axilla minimal bruising some thickening and tenderness in the axilla which is what she said led her to initial presentation. No breast masses or skin changes otherwise noted in the right breast left breast unremarkable to exam  CHEST/LUNGS: chest symmetric with normal A/P diameter, normal respiratory rate and rhythm, lungs clear to auscultation without wheezes, rales or rhonchi. No accessory muscle use. Scars None   CARDIOVASCULAR: Heart sounds are normal.  Regular rate and rhythm without murmur, gallop or rub. Normal S1 and S2. . Carotid and femoral pulses 2+/4 and equal bilaterally. ABDOMEN: Normal shape.  and Laparoscopic scar(s) present. Normal bowel sounds. No bruits. [place] Sorrow \"soft, nontender, nondistended, no masses or organomegaly. no evidence of hernia. Percussion: Normal without hepatosplenomegally. Tenderness: absent. RECTAL: deferred, not clinically indicated  NEUROLOGIC: There are no focalizing motor or sensory deficits. CN II-XII are grossly intact. Modena Sorrow EXTREMITIES: no cyanosis, no clubbing and no edema. Electronically signed by Jordyn Barreto MD on 2020 at 3:00 PM    The patients records and films were reviewed independently. Time was given for questions . All questions were answered to the patients satisfaction. A total time of 60 minutes  was spent with at least 51% related to counseling and coordination of care.

## 2020-08-14 ENCOUNTER — VIRTUAL VISIT (OUTPATIENT)
Dept: ONCOLOGY | Age: 60
End: 2020-08-14
Payer: COMMERCIAL

## 2020-08-14 LAB — SARS-COV-2: NOT DETECTED

## 2020-08-14 PROCEDURE — 96040 PR GENETIC COUNSELING, EACH 30 MIN: CPT | Performed by: GENETIC COUNSELOR, MS

## 2020-08-14 NOTE — PROGRESS NOTES
elected to decline genetic testing today. She would like additional time to consider her options and discuss the testing with her family members before making a final decision. She was instructed to contact her genetic counselor at [phone] with her final decision to proceed. A total of 40 minutes were spent with Ms. Lind and 50% of the time was spent educating and counseling. The [address] [place] [address] would be glad to offer our assistance should you have any questions or concerns about this information. Please feel free to contact us at [phone]. Lizette Baron.  Edwar Garvey MS, [facility]   Licensed Genetic Counselor

## 2020-08-17 ENCOUNTER — HOSPITAL ENCOUNTER (OUTPATIENT)
Dept: PHYSICAL THERAPY | Age: 60
Setting detail: THERAPIES SERIES
Discharge: HOME | End: 2020-08-17
Payer: COMMERCIAL

## 2020-08-17 PROCEDURE — 97161 PT EVAL LOW COMPLEX 20 MIN: CPT

## 2020-08-17 PROCEDURE — 97110 THERAPEUTIC EXERCISES: CPT

## 2020-08-17 PROCEDURE — 97535 SELF CARE MNGMENT TRAINING: CPT

## 2020-08-17 NOTE — PROGRESS NOTES
Patient contacted regarding COVID-19 screen. Following questions asked: In the last month, have you been in contact with someone who was confirmed or suspected to have Coronavirus/COVID-19:  Patient stated NO    Pt was informed only 1  visitor allowed. Please bring masks. Inform pt will need to have urine test done if she hasn't had a tubal ligation or hysterectomy. Do you or family members have any of the following symptoms:  Cough-no   Muscle pain-no   Shortness of breath-no   Fever-no   Weakness-no  Severe headache-no   Sore throat-no   Respiratory symptoms-no    Have you traveled internationally in the last month?  No     Have you been to the emergency room recently-no

## 2020-08-17 NOTE — H&P
Kathleen Olivarez MD  [facility]  General Surgery  H and P for Surgery   Pt Name: Tiff Humphreys  Date of Birth 1960   Today's Date: 8/17/2020  Medical Record Number: [id number]  Referring Provider: No ref. provider found  Primary Care Provider: Estelle Mathur DO  No chief complaint on file. ASSESSMENT      Problem List Items Addressed This Visit     Active Hospital Problems    Diagnosis Date Noted    Carcinoma of upper-outer quadrant of right breast in female, estrogen receptor [facility] [C50.411, Z17.1] 08/12/2020           Past Medical History:   Diagnosis Date    Acquired autoimmune hypothyroidism     Breast cancer ([facility] 08/2020    Diverticulitis     Dr. Bonilla List History of kidney stones     Intestinal metaplasia of gastric mucosa         Osteopenia 2006          Cancer Staging  Carcinoma of upper-outer quadrant of right breast in female, estrogen receptor negative ([facility]  Staging form: Breast, AJCC 8th Edition  - Clinical stage from 8/12/2020: Stage IA (cT1b, cN0, cM0, G1, ER+, ID+, HER2-) - Signed by Kathleen Olivarez MD on 8/12/2020     PLANS      1. Schedule Bibi Lluvia for  1.  US needle loc RIGHT by radiology  2. Sent injection RIGHT by me  3. Right lumpectomy and sln bx  2. In the office, I had a discussion with the patient regarding the treatment options for invasive breast cancer. We discussed lumpectomy and radiation versus mastectomy. We discussed the fact that there is no statistical difference in long term survival or recurrence between the two options. 3. Candidate for Lumpectomy YES/NO: Yes  4. Candidate for omission of sln bx YES/NO: No  5. Candidate for potential omission of radiation YES/NO: No  6. We had a long discussion in the office regarding treatment options for breast cancer. We discussed lumpectomy and radiation versus simple mastectomy with or without reconstruction.  For lumpectomy, we discussed the conduct of the operation, types of anesthesia available, possible use of needle localization preop and expected postop recovery and cosmetic outcome. We also discussed the risk of recurrence after lumpectomy and radiation estimated to be 9-11% of which 50% of the recurrence is non-invasive breast cancer and the other 50% is invasive cancer. We covered radiation therapy, its typical course and average number of treatments required, as well as possible side effects. In addition, we discussed treatment if recurrence developed which is mastectomy. Finally we discussed the results of NSABP B-24 which showed a 50% reduction in local recurrence with the use of adjuvant tamoxifen taken for 5 years after radiation. We also covered simple mastectomy. The conduct of the operation, use of general anesthesia, the expected postop recovery and cosmetic outcome with and without reconstruction. The recurrence rate of 1% was estimated as the risk of recurrence. After this discussion, the patient's questions were answered and has decided to proceed with surgical intervention. 7. For lumpectomy, we covered the conduct of the operation, the possible use of needle localization preoperatively, the anesthetic options, the typical post op course and cosmetic outcome, and the complications including bleeding, infection, seroma, and reoperation for positive margins. 8. For mastectomy, we covered the conduct of the operation, the use of general anesthesia, the typical post op course and cosmetic outcome. We also covered reconstruction options both immediate and delayed and referral was made if the patient desired reconstruction, or the use of a bra prosthesis. We also covered the possible complications including bleeding, infection, skin slough, seroma formation and others. 9. We also discussed staging and the importance of lymph node sampling. We discussed the use of sentinel lymph node biopsy versus standard axillary lymph node dissection.  We discussed the complications of axillary lymph node dissection and how the information is used in treatment decisions and prognosis. We discussed how the sentinel lymph node is identified and its significance. We discussed the possibility of not finding the sentinel node as well as a 5% false negative rate. 10.  We discussed the ACOSOG  Z011 trial where even if sln is positive, no benefit for further axillary surgery has been shown in lumpectomy in patients getting radiation. 11. We also covered adjuvant chemotherapy and radiation therapy if they would be required. After these discussions, the patient's questions were answered and has elected to proceed with surgery. 12. Her need for genetic testing referral was calculated by questions asked during her mammograms and she was is not deemed to be a candidate for testing. For those who are deemed appropriate, referral to the genetic counseling service at [address] [place] [address] was made. Olya Muller meets the following criteria for further genetic risk evaluation based on NCCN guidelines:   Breast Cancer diagnosed age 48 years or younger.  Triple-negative (ER-, LA-, HER2-) breast cancer diagnosed age 61 years or younger.  Two breast cancer primaries   Breast cancer at any age and   o 3 or more close blood relatives with:  - Breast cancer diagnosis at age 48 years or younger; or  - Invasive ovarian cancer; or  - Male breast cancer; or  - Pancreatic cancer; or  - High-grade ([place] score greater than or equal to 7) or metastatic prostate cancer. o 2 or more close blood relatives with breast cancer at any age.  Lobular breast cancer.  Diffuse gastric cancer   Breast cancer, gastrointestinal cancer, or hamartomatous polyps, ovarian sex chord tumors, pancreatic cancer, testicular sertoli cell tumors, or childhood skin pigmentation. 14. Status: outpatient  15. Planned anesthesia: MAC  16.  She will undergo pre-operative clearance per anesthesia guidelines with risk factors listed under the past medical history diagnosis & problem list.  13.  Perioperative discontinuation of        ASA, Plavix, warfarin, Brillinta, Effient, Pradaxa, Eliquis and Xarelto. Continuation of 81 mg Aspirin is acceptable. 14.  Perioperative medical clearance is not        required       The patient was counseled at length about the risks of enid Covid-19 during their perioperative period and any recovery window from their procedure. The patient was made aware that enid Covid-19  may worsen their prognosis for recovering from their procedure  and lend to a higher morbidity and/or mortality risk. All material risks, benefits, and reasonable alternatives including postponing the procedure were discussed. The patient does wish to proceed with the procedure at this time. Orders Placed This Encounter:  No orders of the defined types were placed in this encounter. Lluvia Huff is a 56 y.o.  female seen in the office for evaluation of breast cancer. She was referred for evaluation and discussion of treatment options for carcinoma of the right UOQ. she is had core biopsy, right demonstrating a low grade invasive ductal cancer of the right breast. Estrogen receptor status is positive. Progesterone receptor status is positive. HER-2/deysi receptors stain 2+ by IHC. Prior to the biopsy she complained of breast pain in the right breast and denied galactorrhea, new or changing breast lumps, nipple changes and nipple discharge. Risk assessment: none. She has not been on previous estrogen therapy. Felt something in right axillary area. No results found.       Past Medical History  Past Medical History:   Diagnosis Date    Acquired autoimmune hypothyroidism     Breast cancer ([facility] 08/2020    Diverticulitis     Dr. Elias Warren History of kidney stones     Intestinal metaplasia of gastric mucosa         Osteopenia 2006       Past Surgical History  Past Surgical History:   Procedure Laterality Date     SECTION  9067,5749    x2    COLONOSCOPY  2018    Dr. Shell Bis Left 2017    With left ureteroscopy, basket retrieval of stone fragments, left ureterenoscopy, basket retrieval of renal stones, and left ureter stent placement-- [address]  [id number]    pelvic    TAMIKO [address] BIOPSY RIGHT  2020    TAMIKO [address] RIGHT 2020 [facility]    UPPER GASTROINTESTINAL ENDOSCOPY  2010      Family History  Family History   Problem Relation Age of Onset    Other Mother         fuches corneal dystrophy, scleroderma, osteoporosis    High Blood Pressure Mother         pulmonary htn    High Blood Pressure Father     Cancer Father         lung    Diabetes Maternal Grandmother     Cancer Maternal Grandmother         pancreatic    Other Maternal Grandfather         fuches corneal dystrophy    Other Sister         fuches corneal dystrophy    Other Sister         fuches corneal dystrophy    Heart Disease Sister         pacemaker    Other Sister         fuches corneal dystrophy, hypothyroidism    Other Sister         hypothyroidism, reynauds     Cancer-related family history includes Cancer in her father and maternal grandmother. Medications  Outpatient Encounter Medications as of 2020   Medication Sig Dispense Refill    thyroid (ARMOUR THYROID) 15 MG tablet Take 15 mg by mouth daily Alternate every other day with 30 mg      [DISCONTINUED] ciprofloxacin (CIPRO) 500 MG tablet Take 500 mg by mouth 2 times daily      [DISCONTINUED] Cholecalciferol (VITAMIN D3) 1000 UNITS CAPS Take 1,000 Units by mouth 3 times daily       No facility-administered encounter medications on file as of 2020. No current facility-administered medications for this encounter.       Current Outpatient Medications   Medication Sig Dispense Refill    thyroid (ARMOUR THYROID) 15 MG tablet Take 15 mg by mouth daily Alternate every other day with 30 mg       Allergies  Allergies   Allergen Reactions    Acetaminophen      Gi upset    Aspartame And Phenylalanine     Bextra [Valdecoxib]      urinary retention    Doxycycline      GI    Magnesium Citrate      Internal  Pain, nausea, vomiting, diarrhea    Ciprofloxacin Palpitations    Flagyl [Metronidazole] Palpitations    Neomycin Rash       Social History  Social History     Socioeconomic History    Marital status:      Spouse name: Not on file    Number of children: 2    Years of education: Not on file    Highest education level: Not on file   Occupational History    Not on file   Social Needs    Financial resource strain: Not on file    Food insecurity     Worry: Not on file     Inability: Not on file   [employer] needs     Medical: Not on file     Non-medical: Not on file   Tobacco Use    Smoking status: Former Smoker     Packs/day: 1.00     Years: 20.00     Pack years: 20.00     Types: Cigarettes     Last attempt to quit: 1997     Years since quittin.6    Smokeless tobacco: Never Used   Substance and Sexual Activity    Alcohol use: No    Drug use: No    Sexual activity: Not on file   Lifestyle    Physical activity     Days per week: Not on file     Minutes per session: Not on file    Stress: Not on file   Relationships    Social connections     Talks on phone: Not on file     Gets together: Not on file     Attends [employer] service: Not on file     Active member of club or organization: Not on file     Attends meetings of clubs or organizations: Not on file     Relationship status: Not on file    Intimate partner violence     Fear of current or ex partner: Not on file     Emotionally abused: Not on file     Physically abused: Not on file     Forced sexual activity: Not on file   Other Topics Concern    Not on file   Social History Narrative    Not on file         [address] Maintenance   Topic Date Due    Hepatitis C screen  1960   [facility] HIV screen  05/28/1973    DTaP/Tdap/Td vaccine (1 - Tdap) 05/28/1977    Cervical cancer screen  05/28/1979    Lipid screen  05/28/1998    Diabetes screen  05/28/1998    Shingles Vaccine (1 of 2) 05/28/2008    Colon cancer screen colonoscopy  05/28/2008    Flu vaccine (1) 09/01/2020    Breast cancer screen  08/04/2021    Hepatitis A vaccine  Aged Out    Hepatitis B vaccine  Aged Out    Hib vaccine  Aged Out    Meningococcal (ACWY) vaccine  Aged Out    Pneumococcal 0-64 years Vaccine  Aged Out       Review of Systems  Constitutional: Negative for activity change, appetite change, chills, diaphoresis, fatigue, fever and unexpected weight change. HENT: Negative for congestion, dental problem, drooling, ear discharge, ear pain, facial swelling, hearing loss, mouth sores, nosebleeds, postnasal drip, rhinorrhea, sinus pressure, sinus pain, sneezing, sore throat, tinnitus, trouble swallowing and voice change. Eyes: Negative for photophobia, pain, discharge, redness, itching and visual disturbance. Respiratory: Negative for apnea, cough, choking, chest tightness, shortness of breath, wheezing and stridor. Cardiovascular: Negative for chest pain, palpitations and leg swelling. Gastrointestinal: Negative for abdominal distention, abdominal pain, anal bleeding, blood in stool, constipation, diarrhea, nausea, rectal pain and vomiting. Genitourinary: Negative for decreased urine volume, difficulty urinating, dyspareunia, dysuria, enuresis, flank pain, frequency, genital sores, hematuria, menstrual problem, pelvic pain, urgency, vaginal bleeding, vaginal discharge and vaginal pain. Musculoskeletal: Negative for arthralgias, back pain, gait problem, joint swelling, myalgias, neck pain and neck stiffness. Skin: Negative for color change, pallor, rash and wound.    Neurological: Negative for dizziness, tremors, seizures, syncope, facial asymmetry, speech difficulty, weakness, light-headedness, numbness and deferred, not clinically indicated  NEUROLOGIC: There are no focalizing motor or sensory deficits. CN II-XII are grossly intact. [place] EXTREMITIES: no cyanosis, no clubbing and no edema.                Electronically signed by Allen Jones MD on 8/17/2020 at 12:13 PM

## 2020-08-17 NOTE — PROGRESS NOTES
** PLEASE SIGN, DATE AND TIME CERTIFICATION BELOW AND RETURN TO [facility] OUTPATIENT REHABILITATION (FAX #: [phone]). ATTEST/CO-SIGN IF ACCESSING VIA [employer]. THANK YOU.**    I certify that I have examined the patient below and determined that Physical Medicine and Rehabilitation service is necessary and that I approve the established plan of care for up to 90 days or as specifically noted. Attestation, signature or co-signature of physician indicates approval of certification requirements.    ________________________ ____________ __________  Physician Signature   Date   Time  [facility] with [facility]  PHYSICAL THERAPY  [x] EVALUATION    [x]  [facility]     Date: 2020  Patient Name:  Silvia Blackmon  : 1960  MRN: [id number]      Referring Practitioner Enma Travis MD with Dr. Jodi Herrera   Diagnosis Malignant neoplasm of upper-outer quadrant of right female breast [C50.411]    Treatment Diagnosis R shoulder tightness, Z71.9   Date of Evaluation 20    Additional Pertinent History 20 R IDC ER/ME+, HER2-, 20 R Lumpectomy with SLNB; thyroid disease      Functional Outcome Measure Used O: QuickDASH   Functional Outcome Score    11.4% eval (20)       Insurance: Primary: Payor: [address] /  /  / ,   Secondary:    Authorization Information: Modalities ok, no aquatics or massage   Visit # 1, 1/10 for progress note   Visits Allowed: Unlimited   Recertification Date: 66   Physician Follow-Up: 20 R lumpectomy       SUBJECTIVE: Reports since biopsy, has a \"stitch or stab\" at right lateral flank that dissipates with activity but returns with rest. To have lumpectomy with SLNB tomorrow. PAIN: 0/10    Social/Functional History and Current Status:  Medications and Allergies have been reviewed and are listed on Medical History Questionnaire.     Silvia Blackmon lives with spouse in a single story home with stairs and no Education    Plan of care initiated. Plan to see patient up to 2 times per week for 12 weeks to address the treatment planned outlined above.     Time In 1200   Time Out 1250       Timed Code Minutes: Min Units   ADL (74723) 10 1   Aquatics (83764)     Gait (21739)     Manual Therapy (09153)     Massage (04796)     Neuro (82870)     Th. Activities (94108)     Th. Exercise (71951) 15 1   Ultrasound (63095)     Ionto (98369)     Manual E-Stim (41990)          TOTAL SESSION TIME: 50 min       Electronically Signed by: Elvia Liao PT, DPT, [facility] [id number] 8/17/2020

## 2020-08-18 ENCOUNTER — ANESTHESIA (OUTPATIENT)
Dept: OPERATING ROOM | Age: 60
End: 2020-08-18
Payer: COMMERCIAL

## 2020-08-18 ENCOUNTER — HOSPITAL ENCOUNTER (OUTPATIENT)
Dept: NUCLEAR MEDICINE | Age: 60
Discharge: HOME | End: 2020-08-18
Attending: SURGERY
Payer: COMMERCIAL

## 2020-08-18 ENCOUNTER — HOSPITAL ENCOUNTER (OUTPATIENT)
Dept: WOMENS IMAGING | Age: 60
Discharge: HOME | End: 2020-08-18
Attending: SURGERY
Payer: COMMERCIAL

## 2020-08-18 ENCOUNTER — HOSPITAL ENCOUNTER (OUTPATIENT)
Age: 60
Setting detail: OUTPATIENT SURGERY
Discharge: HOME | End: 2020-08-18
Attending: SURGERY | Admitting: SURGERY
Payer: COMMERCIAL

## 2020-08-18 ENCOUNTER — ANESTHESIA EVENT (OUTPATIENT)
Dept: OPERATING ROOM | Age: 60
End: 2020-08-18
Payer: COMMERCIAL

## 2020-08-18 ENCOUNTER — HOSPITAL ENCOUNTER (OUTPATIENT)
Dept: WOMENS IMAGING | Age: 60
Discharge: HOME | End: 2020-08-18
Payer: COMMERCIAL

## 2020-08-18 VITALS
BODY MASS INDEX: 25.3 KG/M2 | SYSTOLIC BLOOD PRESSURE: 122 MMHG | RESPIRATION RATE: 16 BRPM | WEIGHT: 134 LBS | DIASTOLIC BLOOD PRESSURE: 68 MMHG | TEMPERATURE: 96.6 F | HEART RATE: 58 BPM | HEIGHT: 61 IN | OXYGEN SATURATION: 100 %

## 2020-08-18 VITALS — SYSTOLIC BLOOD PRESSURE: 91 MMHG | OXYGEN SATURATION: 99 % | DIASTOLIC BLOOD PRESSURE: 55 MMHG

## 2020-08-18 PROBLEM — Z98.890 S/P LUMPECTOMY, RIGHT BREAST: Status: ACTIVE | Noted: 2020-08-18

## 2020-08-18 PROCEDURE — 6360000002 HC RX W HCPCS: Performed by: NURSE ANESTHETIST, CERTIFIED REGISTERED

## 2020-08-18 PROCEDURE — 19285 PERQ DEV BREAST 1ST US IMAG: CPT

## 2020-08-18 PROCEDURE — A9541 TC99M SULFUR COLLOID: HCPCS | Performed by: SURGERY

## 2020-08-18 PROCEDURE — 38792 RA TRACER ID OF SENTINL NODE: CPT | Performed by: SURGERY

## 2020-08-18 PROCEDURE — 3700000001 HC ADD 15 MINUTES (ANESTHESIA): Performed by: SURGERY

## 2020-08-18 PROCEDURE — 3600000003 HC SURGERY LEVEL 3 BASE: Performed by: SURGERY

## 2020-08-18 PROCEDURE — 3430000000 HC RX DIAGNOSTIC RADIOPHARMACEUTICAL: Performed by: SURGERY

## 2020-08-18 PROCEDURE — 7100000010 HC PHASE II RECOVERY - FIRST 15 MIN: Performed by: SURGERY

## 2020-08-18 PROCEDURE — 7100000011 HC PHASE II RECOVERY - ADDTL 15 MIN: Performed by: SURGERY

## 2020-08-18 PROCEDURE — 6360000002 HC RX W HCPCS: Performed by: SURGERY

## 2020-08-18 PROCEDURE — 2500000003 HC RX 250 WO HCPCS: Performed by: SURGERY

## 2020-08-18 PROCEDURE — 38525 BIOPSY/REMOVAL LYMPH NODES: CPT | Performed by: SURGERY

## 2020-08-18 PROCEDURE — 3700000000 HC ANESTHESIA ATTENDED CARE: Performed by: SURGERY

## 2020-08-18 PROCEDURE — 3600000013 HC SURGERY LEVEL 3 ADDTL 15MIN: Performed by: SURGERY

## 2020-08-18 PROCEDURE — 19301 PARTIAL MASTECTOMY: CPT | Performed by: SURGERY

## 2020-08-18 PROCEDURE — 38792 RA TRACER ID OF SENTINL NODE: CPT

## 2020-08-18 PROCEDURE — 76098 X-RAY EXAM SURGICAL SPECIMEN: CPT

## 2020-08-18 PROCEDURE — 2500000003 HC RX 250 WO HCPCS: Performed by: NURSE ANESTHETIST, CERTIFIED REGISTERED

## 2020-08-18 PROCEDURE — 2709999900 HC NON-CHARGEABLE SUPPLY: Performed by: SURGERY

## 2020-08-18 PROCEDURE — 88307 TISSUE EXAM BY PATHOLOGIST: CPT

## 2020-08-18 PROCEDURE — 2580000003 HC RX 258: Performed by: SURGERY

## 2020-08-18 PROCEDURE — 2580000003 HC RX 258: Performed by: NURSE ANESTHETIST, CERTIFIED REGISTERED

## 2020-08-18 PROCEDURE — 77065 DX MAMMO INCL CAD UNI: CPT

## 2020-08-18 RX ORDER — TRAMADOL HYDROCHLORIDE 50 MG/1
50 TABLET ORAL EVERY 6 HOURS PRN
Status: DISCONTINUED | OUTPATIENT
Start: 2020-08-18 | End: 2020-08-18 | Stop reason: HOSPADM

## 2020-08-18 RX ORDER — TRAMADOL HYDROCHLORIDE 50 MG/1
50 TABLET ORAL EVERY 6 HOURS PRN
Qty: 20 TABLET | Refills: 0 | Status: SHIPPED | OUTPATIENT
Start: 2020-08-18 | End: 2020-08-23

## 2020-08-18 RX ORDER — SODIUM CHLORIDE 0.9 % (FLUSH) 0.9 %
10 SYRINGE (ML) INJECTION PRN
Status: DISCONTINUED | OUTPATIENT
Start: 2020-08-18 | End: 2020-08-18 | Stop reason: HOSPADM

## 2020-08-18 RX ORDER — SODIUM CHLORIDE, SODIUM LACTATE, POTASSIUM CHLORIDE, CALCIUM CHLORIDE 600; 310; 30; 20 MG/100ML; MG/100ML; MG/100ML; MG/100ML
INJECTION, SOLUTION INTRAVENOUS CONTINUOUS PRN
Status: DISCONTINUED | OUTPATIENT
Start: 2020-08-18 | End: 2020-08-18 | Stop reason: SDUPTHER

## 2020-08-18 RX ORDER — MIDAZOLAM HYDROCHLORIDE 1 MG/ML
INJECTION INTRAMUSCULAR; INTRAVENOUS PRN
Status: DISCONTINUED | OUTPATIENT
Start: 2020-08-18 | End: 2020-08-18 | Stop reason: SDUPTHER

## 2020-08-18 RX ORDER — FENTANYL CITRATE 50 UG/ML
INJECTION, SOLUTION INTRAMUSCULAR; INTRAVENOUS PRN
Status: DISCONTINUED | OUTPATIENT
Start: 2020-08-18 | End: 2020-08-18 | Stop reason: SDUPTHER

## 2020-08-18 RX ORDER — SODIUM CHLORIDE 9 MG/ML
INJECTION, SOLUTION INTRAVENOUS CONTINUOUS
Status: DISCONTINUED | OUTPATIENT
Start: 2020-08-18 | End: 2020-08-18 | Stop reason: HOSPADM

## 2020-08-18 RX ORDER — SODIUM CHLORIDE 0.9 % (FLUSH) 0.9 %
10 SYRINGE (ML) INJECTION EVERY 12 HOURS SCHEDULED
Status: DISCONTINUED | OUTPATIENT
Start: 2020-08-18 | End: 2020-08-18 | Stop reason: HOSPADM

## 2020-08-18 RX ORDER — PROPOFOL 10 MG/ML
INJECTION, EMULSION INTRAVENOUS PRN
Status: DISCONTINUED | OUTPATIENT
Start: 2020-08-18 | End: 2020-08-18 | Stop reason: SDUPTHER

## 2020-08-18 RX ORDER — ONDANSETRON 2 MG/ML
INJECTION INTRAMUSCULAR; INTRAVENOUS PRN
Status: DISCONTINUED | OUTPATIENT
Start: 2020-08-18 | End: 2020-08-18 | Stop reason: SDUPTHER

## 2020-08-18 RX ORDER — LIDOCAINE HCL/PF 100 MG/5ML
SYRINGE (ML) INJECTION PRN
Status: DISCONTINUED | OUTPATIENT
Start: 2020-08-18 | End: 2020-08-18 | Stop reason: SDUPTHER

## 2020-08-18 RX ADMIN — MIDAZOLAM HYDROCHLORIDE 2 MG: 1 INJECTION, SOLUTION INTRAMUSCULAR; INTRAVENOUS at 11:08

## 2020-08-18 RX ADMIN — PROPOFOL 50 MG: 10 INJECTION, EMULSION INTRAVENOUS at 11:21

## 2020-08-18 RX ADMIN — CEFAZOLIN 2 G: 10 INJECTION, POWDER, FOR SOLUTION INTRAVENOUS at 11:11

## 2020-08-18 RX ADMIN — SODIUM CHLORIDE: 9 INJECTION, SOLUTION INTRAVENOUS at 11:00

## 2020-08-18 RX ADMIN — Medication 0.95 MILLICURIE: at 10:00

## 2020-08-18 RX ADMIN — ONDANSETRON HYDROCHLORIDE 4 MG: 4 INJECTION, SOLUTION INTRAMUSCULAR; INTRAVENOUS at 12:06

## 2020-08-18 RX ADMIN — PROPOFOL 50 MG: 10 INJECTION, EMULSION INTRAVENOUS at 11:36

## 2020-08-18 RX ADMIN — SODIUM CHLORIDE, POTASSIUM CHLORIDE, SODIUM LACTATE AND CALCIUM CHLORIDE: 600; 310; 30; 20 INJECTION, SOLUTION INTRAVENOUS at 11:56

## 2020-08-18 RX ADMIN — FENTANYL CITRATE 50 MCG: 50 INJECTION, SOLUTION INTRAMUSCULAR; INTRAVENOUS at 11:09

## 2020-08-18 RX ADMIN — PROPOFOL 50 MG: 10 INJECTION, EMULSION INTRAVENOUS at 11:53

## 2020-08-18 RX ADMIN — PROPOFOL 50 MG: 10 INJECTION, EMULSION INTRAVENOUS at 11:29

## 2020-08-18 RX ADMIN — PROPOFOL 50 MG: 10 INJECTION, EMULSION INTRAVENOUS at 11:15

## 2020-08-18 RX ADMIN — Medication 40 MG: at 11:09

## 2020-08-18 RX ADMIN — PROPOFOL 50 MG: 10 INJECTION, EMULSION INTRAVENOUS at 11:09

## 2020-08-18 RX ADMIN — PROPOFOL 50 MG: 10 INJECTION, EMULSION INTRAVENOUS at 12:03

## 2020-08-18 RX ADMIN — PHENYLEPHRINE HYDROCHLORIDE 100 MCG: 10 INJECTION INTRAVENOUS at 11:35

## 2020-08-18 RX ADMIN — SODIUM CHLORIDE: 9 INJECTION, SOLUTION INTRAVENOUS at 11:07

## 2020-08-18 RX ADMIN — PROPOFOL 50 MG: 10 INJECTION, EMULSION INTRAVENOUS at 11:45

## 2020-08-18 ASSESSMENT — PULMONARY FUNCTION TESTS
PIF_VALUE: 1
PIF_VALUE: 1
PIF_VALUE: 0
PIF_VALUE: 1
PIF_VALUE: 0
PIF_VALUE: 1
PIF_VALUE: 0
PIF_VALUE: 1
PIF_VALUE: 0
PIF_VALUE: 0
PIF_VALUE: 1
PIF_VALUE: 0
PIF_VALUE: 1
PIF_VALUE: 0
PIF_VALUE: 1
PIF_VALUE: 0
PIF_VALUE: 0
PIF_VALUE: 1
PIF_VALUE: 1
PIF_VALUE: 0
PIF_VALUE: 1
PIF_VALUE: 0
PIF_VALUE: 1
PIF_VALUE: 0
PIF_VALUE: 1
PIF_VALUE: 0
PIF_VALUE: 1
PIF_VALUE: 0
PIF_VALUE: 1
PIF_VALUE: 0
PIF_VALUE: 1
PIF_VALUE: 0
PIF_VALUE: 1
PIF_VALUE: 0
PIF_VALUE: 1
PIF_VALUE: 0

## 2020-08-18 ASSESSMENT — PAIN SCALES - GENERAL
PAINLEVEL_OUTOF10: 0
PAINLEVEL_OUTOF10: 4
PAINLEVEL_OUTOF10: 5
PAINLEVEL_OUTOF10: 4

## 2020-08-18 ASSESSMENT — PAIN - FUNCTIONAL ASSESSMENT: PAIN_FUNCTIONAL_ASSESSMENT: 0-10

## 2020-08-18 NOTE — PROGRESS NOTES
Pt returned to [facility] room 10. Vitals and assessment as charted. 0.9 infusing, @400ml to count from PACU. Ludy Leos refusing drink and water. Family at the bedside. Pt and family verbalized understanding of discharge criteria and call light use. Call light in reach.

## 2020-08-18 NOTE — PROGRESS NOTES
Pt returned to [facility] room 10. Vitals and assessment as charted. 0.9 infusing, @100ml to count from PACU. Pt has crackers and water. Family at the bedside. Pt and family verbalized understanding of discharge criteria and call light use. Call light in reach.

## 2020-08-18 NOTE — OP NOTE
[address] [place] [address]  Operative Report    PATIENT NAME: Car Hawk  MEDICAL RECORD NO. [id number]  SURGEON: Kirsten Montero MD FACS  Primary Care Physician: Larry Manrique,   Date: 8/18/2020, 12:36 PM     PROCEDURE PERFORMED: Right  Lumpectomy and [place] Lymphnode biopsy  PREOPERATIVE DIAGNOSIS: Right  infiltrating ductal carcinoma breast cancer  POSTOPERATIVE DIAGNOSIS: Same, path pending  SURGEON:  Kirsten Montero MD FACS  ANESTHESIA:  Monitored Local Anesthesia with Sedation  ESTIMATED BLOOD LOSS:  30  ml  SPECIMEN:  Breast and axillary contents  COMPLICATIONS:  None; patient tolerated the procedure well. DRAINS: none  DISPOSITION: sds  CONDITION: stable      Indications: This patient presents with history of Right breast cancer with clinically negative axillary lymph node exam.      Procedure Details   The patient was seen in the Holding Area. The risks, benefits, complications, treatment options, and expected outcomes had been previously discussed with the patient. The possibilities of reaction to medication, pulmonary aspiration, bleeding, infection, the need for additional procedures, failure to diagnose a condition, and creating a complication requiring transfusion or operation were discussed with the patient. The patient concurred with the proposed plan, giving informed consent. The site of surgery properly noted/marked. The patient was brought to the operating room, placed supine on the operating room table, time, was taken, preoperative antibiotics were given, and signed consent on the chart. Sequential compression devices were in place. Previous needle localization had been performed, as well as sentinel injection. The right axilla was mapped using a hand-held gamma probe, axillary sentinel nodes were identified transcutaneously. The breast and axilla were prepped and draped with ChloraPrep.   After adequate sedation by anesthesia, the wire entered posterior in the axillary line 1 cm subcuticular stitch on the skin. Skin affix glue was applied    Specimens: Salters lymph nodes(s) 1 level 3 and oriented lumpectomy specimen             Cancer Staging  Carcinoma of upper-outer quadrant of right breast in female, estrogen receptor negative ([facility]  Staging form: Breast, AJCC 8th Edition  - Clinical stage from 8/12/2020: Stage IA (cT1b, cN0, cM0, G1, ER+, PA+, HER2-) - Signed by Nona Madison MD on 8/12/2020      Element Response   Substrate(s) used for sentinel node biopsy in the non-neoadjuvant setting Radiotracer   Substrate(s) used for sentinel node biopsy in the neoadjuvant setting    NA   All colored nodes or non colored nodes present at the end of the dye filled lymphatic channel were removed, if dye was used as the substrate for localization. N/A   All significantly radioactive nodes were removed, if radionuclide was used as the substrate for localization. yes   All palpable suspicious nodes were removed, if present.  Yes   If clips were placedin pathologically- involved nodes, those nodes were identified and removed N/A         Nona Madison MD FACS  Electronically signed 8/18/2020 at 12:36 PM

## 2020-08-18 NOTE — ANESTHESIA PRE PROCEDURE
Department of Anesthesiology  Preprocedure Note       Name:  Adryan Au   Age:  56 y.o.  :  1960                                          MRN:  [id number]         Date:  2020      Surgeon: Giulia Dotson):  Dago Mcgill MD    Procedure: Procedure(s):  RIGHT BREAST LUMPECTOMY AND SENTINEL LYMPH NODE BIOPSY    Medications prior to admission:   Prior to Admission medications    Medication Sig Start Date End Date Taking? Authorizing Provider   thyroid (ARMOUR THYROID) 15 MG tablet Take 15 mg by mouth daily Alternate every other day with 30 mg    Historical Provider, MD       Current medications:    Current Facility-Administered Medications   Medication Dose Route Frequency Provider Last Rate Last Dose    0.9 % sodium chloride infusion   Intravenous Continuous Dago Mcgill MD        sodium chloride flush 0.9 % injection 10 mL  10 mL Intravenous 2 times per day Dago Mcgill MD        sodium chloride flush 0.9 % injection 10 mL  10 mL Intravenous PRN Dago Mcgill MD        ceFAZolin (ANCEF) 2 g in dextrose 5 % 50 mL IVPB  2 g Intravenous On Call to MD Sara           Allergies:     Allergies   Allergen Reactions    Acetaminophen      Gi upset    Aspartame And Phenylalanine     Bextra [Valdecoxib]      urinary retention    Doxycycline      GI    Magnesium Citrate      Internal  Pain, nausea, vomiting, diarrhea    Ciprofloxacin Palpitations    Flagyl [Metronidazole] Palpitations    Neomycin Rash       Problem List:    Patient Active Problem List   Diagnosis Code    Hypothyroidism due to acquired atrophy of thyroid E03.4    Tired R53.83    Irritable bowel syndrome with diarrhea K58.0    Slow transit constipation K59.01    Nonintractable headache R51    Body aches R52    Osteopenia M85.80    Family history of scleroderma Z82.69    Family history of autoimmune disorder Z83.2    Ureteral stone N20.1    Renal stones N20.0    Carcinoma of upper-outer quadrant of right breast in female, estrogen receptor negative ([facility] C50.411, Z17.1       Past Medical History:        Diagnosis Date    Acquired autoimmune hypothyroidism     Breast cancer ([facility] 2020    Diverticulitis     Dr. Maged Weiner History of kidney stones     Intestinal metaplasia of gastric mucosa         Osteopenia        Past Surgical History:        Procedure Laterality Date     SECTION  8394,1432    x2    COLONOSCOPY  2018    Dr. Nanci Jewell Left 2017    With left ureteroscopy, basket retrieval of stone fragments, left ureterenoscopy, basket retrieval of renal stones, and left ureter stent placement-- [address]  [phone]    pelvic    TAMIKO US GUID NDL BIOPSY RIGHT  2020    TAMIKO [address] RIGHT 2020 [facility] [address]    UPPER GASTROINTESTINAL ENDOSCOPY         Social History:    Social History     Tobacco Use    Smoking status: Former Smoker     Packs/day: 1.00     Years: 20.00     Pack years: 20.00     Types: Cigarettes     Last attempt to quit: 1997     Years since quittin.6    Smokeless tobacco: Never Used   Substance Use Topics    Alcohol use: No                                Counseling given: Not Answered      Vital Signs (Current):   Vitals:    20 1027   BP: 110/60   Pulse: 66   Resp: 18   Temp: 96.7 °F (35.9 °C)   TempSrc: Temporal   SpO2: 93%   Weight: 134 lb (60.8 kg)                                              BP Readings from Last 3 Encounters:   20 110/60   20 124/70   18 119/88       NPO Status:                                                                                 BMI:   Wt Readings from Last 3 Encounters:   20 134 lb (60.8 kg)   20 134 lb (60.8 kg)   18 135 lb 3.2 oz (61.3 kg)     Body mass index is 25.32 kg/m².     CBC:   Lab Results   Component Value Date    WBC 8.3 2018    RBC 4.26 2018    RBC 0-5 2017    HGB 14.3

## 2020-08-18 NOTE — OP NOTE
[address] [place] [address]  Operative Report    PATIENT NAME: Buddy Ga  MEDICAL RECORD NO. [id number]  SURGEON: Tatum Edouard MD, FACS  Primary Care Physician: Geremias Gunderson,   Date: 8/18/2020, 10:15 AM     PROCEDURE PERFORMED: Right  [place] Lymph node Injection  PREOPERATIVE DIAGNOSIS: Right Breast Cancer  POSTOPERATIVE DIAGNOSIS: Same, path pending  SURGEON:  Tatum Edouard MD, FACS  ANESTHESIA:  None  ESTIMATED BLOOD LOSS:  None  COMPLICATIONS:  None; patient tolerated the procedure well. CONDITION: stable          Procedure Details     The patient was seen in Nuclear Medicine in radiology. The risks, benefits, complications, treatment options, and expected outcomes were previously discussed with the patient. The possibilities of reaction to medication were discussed with the patient. The patient concurred with the proposed plan, giving informed consent. The site of surgery properly noted/marked. The patient was taken to the nuclear medicine room and identified  and the procedure verified as [place] Lymphnode injection . A Time Out was held and the above information confirmed. The right breast was identified and the site for injection periareaolarly was identified. The breast was prepped with 1% alcohol. 0.5 cc 's of unfiltered technetium sulfur colloid was injected into the dermis of the skin at the site. A sterile dressing was placed over the injection site. The patient tolerated the procedure well.              Tatum Edouard MD FACS  Electronically signed 8/18/2020 at 10:15 AM

## 2020-08-18 NOTE — PROGRESS NOTES
Breast Cancer Surgery Charting:  Contact Type: [facility]    Contact Information: Arrived with her , Socorro Pineda, for needle localization. Having breast surgery today with Dr. Rowdy Kasper. Diagnosis: IDC    Patient Expresses Need(s) For: questions about numbing and how long it will last, questions about where scar will be on the breast, questions about post surgical care of lumpectomy site. Notes: Question addressed as needed and procedure explained. Dr. Mary Horton placed wire. Secured with gauze and tape per protocol. Gift given. Transported patient with belongings to Nuclear Medicine via wheelchair at 1624.

## 2020-08-18 NOTE — ANESTHESIA POSTPROCEDURE EVALUATION
Department of Anesthesiology  Postprocedure Note    Patient: Anai Ferro  MRN: [id number]  YOB: 1960  Date of evaluation: 8/18/2020  Time:  1:58 PM     Procedure Summary     Date:  08/18/20 Room / Location:  [place] [address] / [facility]    Anesthesia Start:  1107 Anesthesia Stop:  3912    Procedure:  RIGHT BREAST LUMPECTOMY AND SENTINEL LYMPH NODE BIOPSY (Right Breast) Diagnosis:  (RT BREAST CANCER)    Surgeon:  Whit Rosenberg MD Responsible Provider:  Ana Reese DO    Anesthesia Type:  MAC ASA Status:  2          Anesthesia Type: MAC    Tobias Phase I: Tobias Score: 10    Tobias Phase II: Tobias Score: 10    Last vitals: Reviewed and per EMR flowsheets.        Anesthesia Post Evaluation    Patient location during evaluation: PACU  Patient participation: complete - patient participated  Level of consciousness: awake  Airway patency: patent  Nausea & Vomiting: no nausea and no vomiting  Complications: no  Cardiovascular status: hemodynamically stable  Respiratory status: acceptable  Hydration status: stable

## 2020-08-19 ENCOUNTER — TELEPHONE (OUTPATIENT)
Dept: SURGERY | Age: 60
End: 2020-08-19

## 2020-08-19 NOTE — TELEPHONE ENCOUNTER
S/p 8/18 rt breast lumpectomy and sentinel lymph node bx    Pt states she is feeling well this morning, dressing clean, dry and intact. Up and moving around, using bathroom okay. Will call the office with any questions or concerns.

## 2020-08-24 ENCOUNTER — HOSPITAL ENCOUNTER (OUTPATIENT)
Age: 60
Setting detail: SPECIMEN
Discharge: HOME | End: 2020-08-24
Payer: COMMERCIAL

## 2020-08-24 PROCEDURE — U0002 COVID-19 LAB TEST NON-CDC: HCPCS

## 2020-08-25 NOTE — PROGRESS NOTES
Fiona Walters. Olga Trivedi MD FACS  General Surgery  Postprocedure Evaluation in Office  Pt Name: Tiff Humphreys  Date of Birth 1960   Today's Date: 8/26/2020  Medical Record Number: [id number]  Referring Provider: No ref. provider found  Primary Care Provider: Estelle Mathur DO  Chief Complaint   Patient presents with    Post-Op Check     s/p right breast lumpectomy-8/18/2020     ASSESSMENT      Problem List Items Addressed This Visit     Carcinoma of upper-outer quadrant of right breast in female, estrogen receptor negative ([facility] - Primary    Relevant Orders    BIOPSY / EXCISION BREAST    S/P lumpectomy and sln bx, right breast    Relevant Orders    BIOPSY / EXCISION BREAST        Bibi Teixeira   Past Medical History:   Diagnosis Date    Acquired autoimmune hypothyroidism     Breast cancer ([facility] 08/2020    Diverticulitis     Dr. Bonilla List History of kidney stones     Intestinal metaplasia of gastric mucosa         Osteopenia 2006        PLANS       1. Schedule for re excision skin margin  2. MAC  3. OP [facility]  4. Follow up: No follow-ups on file. 3.    Orders Placed This Encounter:  Orders Placed This Encounter   Procedures    BIOPSY / EXCISION BREAST     Standing Status:   Future     Standing Expiration Date:   8/25/2021     Order Specific Question:   Pre-procedure Diagnosis     Answer:   re excision skin margin         SUBJECTIVE   Subjective   Bibi Teixeira is seen today for  follow-up. She is 1 week(s) status post Right  lumpectomy right with sentinel lymph node biopsy (date - 8/18/2020). Her pathology was infiltrating ductal carcinoma T1b - Tumor more than 0.5 cm but not more than 1 cm in greatest dimension pN0 - No regional lymph node metastasis histologically, no additional examination for isolated tumor cells (ITC). M0 - No clinical or radiographic evidence of distant metastases. Her stage was IA - T1*, N0, M0.  The  estrogen receptor positive progesterone receptor positiveHer2/Evita protein/oncogene not Not on file    Number of children: 2    Years of education: Not on file    Highest education level: Not on file   Occupational History    Not on file   Social Needs    Financial resource strain: Not on file    Food insecurity     Worry: Not on file     Inability: Not on file    Transportation needs     Medical: Not on file     Non-medical: Not on file   Tobacco Use    Smoking status: Former Smoker     Packs/day: 1.00     Years: 20.00     Pack years: 20.00     Types: Cigarettes     Last attempt to quit: 1997     Years since quittin.6    Smokeless tobacco: Never Used   Substance and Sexual Activity    Alcohol use: No    Drug use: No    Sexual activity: Not on file   Lifestyle    Physical activity     Days per week: Not on file     Minutes per session: Not on file    Stress: Not on file   Relationships    Social connections     Talks on phone: Not on file     Gets together: Not on file     Attends Hinduism service: Not on file     Active member of club or organization: Not on file     Attends meetings of clubs or organizations: Not on file     Relationship status: Not on file    Intimate partner violence     Fear of current or ex partner: Not on file     Emotionally abused: Not on file     Physically abused: Not on file     Forced sexual activity: Not on file   Other Topics Concern    Not on file   Social History Narrative    Not on file     [address] Maintenance   Topic Date Due    Hepatitis C screen  1960    HIV screen  1973    DTaP/Tdap/Td vaccine (1 - Tdap) 1977    Cervical cancer screen  1979    Lipid screen  1998    Diabetes screen  1998    Shingles Vaccine (1 of 2) 2008    Colon cancer screen colonoscopy  2008    Flu vaccine (1) 2020    Breast cancer screen  2021    Hepatitis A vaccine  Aged Out    Hepatitis B vaccine  Aged Out    Hib vaccine  Aged Out    Meningococcal (ACWY) vaccine  Aged Out    Pneumococcal 0-64 years Vaccine  Aged Out     Review of Systems  History obtained from the patient. Constitutional: Denies any fever, chills, fatigue. Wound: Denies any rash, skin color changes or wound problems. Resp: Denies any cough, shortness of breath. CV: Denies any chest pain, orthopnea or syncope. GI: Denies any nausea, vomiting, blood in the stool, constipation or diarrhea. OBJECTIVE      VITALS:  height is 5' 1\" (1.549 m) and weight is 134 lb (60.8 kg). Her temporal temperature is 97.1 °F (36.2 °C). Her blood pressure is 122/62 and her pulse is 77. Her respiration is 18 and oxygen saturation is 99%. CONSTITUTIONAL: Alert and oriented times 3, no acute distress and cooperative to examination. SKIN: Skin color, texture, turgor normal. No rashes or lesions. NECK: Soft, trachea midline and straight  BREAST: The Right breast has a transverse axillary scar from lumpectomy and sln bx. No signs of infection. [place] NEUROLOGIC: No sensory or motor nerve irritation  EXTREMITIES: no cyanosis, no clubbing and no edema  . [facility].  Luci Villar MD [facility]  8/26/2020  8:55 AM

## 2020-08-26 ENCOUNTER — OFFICE VISIT (OUTPATIENT)
Dept: SURGERY | Age: 60
End: 2020-08-26

## 2020-08-26 ENCOUNTER — ANESTHESIA EVENT (OUTPATIENT)
Dept: OPERATING ROOM | Age: 60
End: 2020-08-26
Payer: COMMERCIAL

## 2020-08-26 VITALS
WEIGHT: 134 LBS | RESPIRATION RATE: 18 BRPM | SYSTOLIC BLOOD PRESSURE: 122 MMHG | HEIGHT: 61 IN | DIASTOLIC BLOOD PRESSURE: 62 MMHG | TEMPERATURE: 97.1 F | HEART RATE: 77 BPM | OXYGEN SATURATION: 99 % | BODY MASS INDEX: 25.3 KG/M2

## 2020-08-26 LAB
PERFORMING LAB: NORMAL
REPORT: NORMAL
SARS-COV-2: NOT DETECTED

## 2020-08-26 PROCEDURE — 99024 POSTOP FOLLOW-UP VISIT: CPT | Performed by: SURGERY

## 2020-08-26 NOTE — H&P
Santos Wetzel. Gail Mathias MD [facility]  General Surgery  H and P for Surgery   Pt Name: Sharon Trimble  Date of Birth 1960   Today's Date: 8/26/2020  Medical Record Number: [id number]  Referring Provider: No ref. provider found  Primary Care Provider: Kamila Larios DO  No chief complaint on file. ASSESSMENT      Problem List Items Addressed This Visit     None        Guadalupe Mccarthy   Past Medical History:   Diagnosis Date    Acquired autoimmune hypothyroidism     [facility]    Diverticulitis     Dr. Anuj Alcocer History of kidney stones     Intestinal metaplasia of gastric mucosa     Dr.Shake Iraj Bazan 2006        PLANS       1. Schedule for re excision skin margin  2. MAC  3. OP [facility]  4. Follow up: No follow-ups on file. 3.    Orders Placed This Encounter:  No orders of the defined types were placed in this encounter. SUBJECTIVE   Subjective   uGadalupe Mccarthy is seen today for  follow-up. She is 1 week(s) status post Right  lumpectomy right with sentinel lymph node biopsy (date - 8/18/2020). Her pathology was infiltrating ductal carcinoma T1b - Tumor more than 0.5 cm but not more than 1 cm in greatest dimension pN0 - No regional lymph node metastasis histologically, no additional examination for isolated tumor cells (ITC). M0 - No clinical or radiographic evidence of distant metastases. Her stage was IA - T1*, N0, M0. The  estrogen receptor positive progesterone receptor positiveHer2/Evita protein/oncogene not overexpressedKi 67 - growth factor low. Her skin margin was positive and she needs to have this re excised. She is here in the office to discuss.  No issues post op  Past Medical History:   Diagnosis Date    Acquired autoimmune hypothyroidism     [facility]    Diverticulitis     Dr. Anuj Alcocer History of kidney stones     Intestinal metaplasia of gastric mucosa     Dr.Shake Iraj Bazan 2006     Past Surgical History  Past Surgical History:   Procedure Laterality Date    BREAST LUMPECTOMY Right 8/18/2020    RIGHT BREAST LUMPECTOMY AND SENTINEL LYMPH NODE BIOPSY performed by Demond Garcia MD at [address] [place] [address],[address]  [id number],2206    x2    COLONOSCOPY  08/2018    Dr. Ilene Goldberg Left 06/02/2017    With left ureteroscopy, basket retrieval of stone fragments, left ureterenoscopy, basket retrieval of renal stones, and left ureter stent placement--Dr Sen Rivas LAPAROSCOPY  [phone]    pelvic    TAMIKO US GUID NDL BIOPSY RIGHT  8/4/2020    TAMIKO [address] RIGHT 8/4/2020 [facility]    UPPER GASTROINTESTINAL ENDOSCOPY  2010    US GUIDED NEEDLE LOC OF RIGHT BREAST  8/18/2020    US GUIDED NEEDLE LOC OF RIGHT BREAST 8/18/2020 [facility]     Medications  No current facility-administered medications on file prior to encounter.       Current Outpatient Medications on File Prior to Encounter   Medication Sig Dispense Refill    thyroid (ARMOUR THYROID) 15 MG tablet Take 15 mg by mouth daily Alternate every other day with 30 mg       Allergies  Allergies   Allergen Reactions    Acetaminophen      Gi upset    Aspartame And Phenylalanine     Bextra [Valdecoxib]      urinary retention    Doxycycline      GI    Magnesium Citrate      Internal  Pain, nausea, vomiting, diarrhea    Ciprofloxacin Palpitations    Flagyl [Metronidazole] Palpitations    Neomycin Rash     Social History  Social History     Socioeconomic History    Marital status:      Spouse name: Not on file    Number of children: 2    Years of education: Not on file    Highest education level: Not on file   Occupational History    Not on file   Social Needs    Financial resource strain: Not on file    Food insecurity     Worry: Not on file     Inability: Not on file   [employer] needs     Medical: Not on file     Non-medical: Not on file   Tobacco Use    Smoking status: Former Smoker     Packs/day: 1.00     Years: 20.00     Pack years: 20.00     Types: Cigarettes Last attempt to quit: 1997     Years since quittin.6    Smokeless tobacco: Never Used   Substance and Sexual Activity    Alcohol use: No    Drug use: No    Sexual activity: Not on file   Lifestyle    Physical activity     Days per week: Not on file     Minutes per session: Not on file    Stress: Not on file   Relationships    Social connections     Talks on phone: Not on file     Gets together: Not on file     Attends Mu-ism service: Not on file     Active member of club or organization: Not on file     Attends meetings of clubs or organizations: Not on file     Relationship status: Not on file    Intimate partner violence     Fear of current or ex partner: Not on file     Emotionally abused: Not on file     Physically abused: Not on file     Forced sexual activity: Not on file   Other Topics Concern    Not on file   Social History Narrative    Not on file     [address] Maintenance   Topic Date Due    Hepatitis C screen  1960    HIV screen  1973    DTaP/Tdap/Td vaccine (1 - Tdap) 1977    Cervical cancer screen  1979    Lipid screen  1998    Diabetes screen  1998    Shingles Vaccine (1 of 2) 2008    Colon cancer screen colonoscopy  2008    Flu vaccine (1) 2020    Breast cancer screen  2021    Hepatitis A vaccine  Aged Out    Hepatitis B vaccine  Aged Out    Hib vaccine  Aged Out    Meningococcal (ACWY) vaccine  Aged Out    Pneumococcal 0-64 years Vaccine  Aged Out     Review of Systems  History obtained from the patient. Constitutional: Denies any fever, chills, fatigue. Wound: Denies any rash, skin color changes or wound problems. Resp: Denies any cough, shortness of breath. CV: Denies any chest pain, orthopnea or syncope. GI: Denies any nausea, vomiting, blood in the stool, constipation or diarrhea. OBJECTIVE      VITALS:  vitals were not taken for this visit.    CONSTITUTIONAL: Alert and oriented times 3, no acute distress and cooperative to examination. SKIN: Skin color, texture, turgor normal. No rashes or lesions. NECK: Soft, trachea midline and straight  BREAST: The Right breast has a transverse axillary scar from lumpectomy and sln bx. No signs of infection. [place] NEUROLOGIC: No sensory or motor nerve irritation  EXTREMITIES: no cyanosis, no clubbing and no edema  . Alma Villar MD FACS  8/26/2020  2:18 PM

## 2020-08-27 ENCOUNTER — HOSPITAL ENCOUNTER (OUTPATIENT)
Age: 60
Setting detail: OUTPATIENT SURGERY
Discharge: HOME | End: 2020-08-27
Attending: SURGERY | Admitting: SURGERY
Payer: COMMERCIAL

## 2020-08-27 ENCOUNTER — ANESTHESIA (OUTPATIENT)
Dept: OPERATING ROOM | Age: 60
End: 2020-08-27
Payer: COMMERCIAL

## 2020-08-27 ENCOUNTER — TELEPHONE (OUTPATIENT)
Dept: SPIRITUAL SERVICES | Facility: CLINIC | Age: 60
End: 2020-08-27

## 2020-08-27 VITALS
HEIGHT: 61 IN | DIASTOLIC BLOOD PRESSURE: 57 MMHG | SYSTOLIC BLOOD PRESSURE: 100 MMHG | BODY MASS INDEX: 25.49 KG/M2 | OXYGEN SATURATION: 98 % | RESPIRATION RATE: 16 BRPM | WEIGHT: 135 LBS | HEART RATE: 73 BPM | TEMPERATURE: 97.2 F

## 2020-08-27 VITALS
OXYGEN SATURATION: 90 % | SYSTOLIC BLOOD PRESSURE: 82 MMHG | DIASTOLIC BLOOD PRESSURE: 50 MMHG | RESPIRATION RATE: 10 BRPM

## 2020-08-27 PROCEDURE — 3700000001 HC ADD 15 MINUTES (ANESTHESIA): Performed by: SURGERY

## 2020-08-27 PROCEDURE — 7100000011 HC PHASE II RECOVERY - ADDTL 15 MIN: Performed by: SURGERY

## 2020-08-27 PROCEDURE — 2500000003 HC RX 250 WO HCPCS: Performed by: SURGERY

## 2020-08-27 PROCEDURE — 2500000003 HC RX 250 WO HCPCS

## 2020-08-27 PROCEDURE — 88307 TISSUE EXAM BY PATHOLOGIST: CPT

## 2020-08-27 PROCEDURE — 19120 REMOVAL OF BREAST LESION: CPT | Performed by: SURGERY

## 2020-08-27 PROCEDURE — 2709999900 HC NON-CHARGEABLE SUPPLY: Performed by: SURGERY

## 2020-08-27 PROCEDURE — 7100000010 HC PHASE II RECOVERY - FIRST 15 MIN: Performed by: SURGERY

## 2020-08-27 PROCEDURE — 6360000002 HC RX W HCPCS

## 2020-08-27 PROCEDURE — 3600000013 HC SURGERY LEVEL 3 ADDTL 15MIN: Performed by: SURGERY

## 2020-08-27 PROCEDURE — 2580000003 HC RX 258: Performed by: SURGERY

## 2020-08-27 PROCEDURE — 3700000000 HC ANESTHESIA ATTENDED CARE: Performed by: SURGERY

## 2020-08-27 PROCEDURE — 3600000003 HC SURGERY LEVEL 3 BASE: Performed by: SURGERY

## 2020-08-27 PROCEDURE — 6360000002 HC RX W HCPCS: Performed by: SURGERY

## 2020-08-27 RX ORDER — PROPOFOL 10 MG/ML
INJECTION, EMULSION INTRAVENOUS PRN
Status: DISCONTINUED | OUTPATIENT
Start: 2020-08-27 | End: 2020-08-27 | Stop reason: SDUPTHER

## 2020-08-27 RX ORDER — TRAMADOL HYDROCHLORIDE 50 MG/1
50 TABLET ORAL EVERY 6 HOURS PRN
Status: DISCONTINUED | OUTPATIENT
Start: 2020-08-27 | End: 2020-08-27 | Stop reason: HOSPADM

## 2020-08-27 RX ORDER — TRAMADOL HYDROCHLORIDE 50 MG/1
50 TABLET ORAL EVERY 6 HOURS PRN
Qty: 12 TABLET | Refills: 0 | Status: SHIPPED | OUTPATIENT
Start: 2020-08-27 | End: 2020-08-30

## 2020-08-27 RX ORDER — LIDOCAINE HCL/PF 100 MG/5ML
SYRINGE (ML) INJECTION PRN
Status: DISCONTINUED | OUTPATIENT
Start: 2020-08-27 | End: 2020-08-27

## 2020-08-27 RX ORDER — SODIUM CHLORIDE 0.9 % (FLUSH) 0.9 %
10 SYRINGE (ML) INJECTION EVERY 12 HOURS SCHEDULED
Status: DISCONTINUED | OUTPATIENT
Start: 2020-08-27 | End: 2020-08-27 | Stop reason: HOSPADM

## 2020-08-27 RX ORDER — LIDOCAINE HCL/PF 100 MG/5ML
SYRINGE (ML) INJECTION PRN
Status: DISCONTINUED | OUTPATIENT
Start: 2020-08-27 | End: 2020-08-27 | Stop reason: SDUPTHER

## 2020-08-27 RX ORDER — SODIUM CHLORIDE 0.9 % (FLUSH) 0.9 %
10 SYRINGE (ML) INJECTION PRN
Status: DISCONTINUED | OUTPATIENT
Start: 2020-08-27 | End: 2020-08-27 | Stop reason: HOSPADM

## 2020-08-27 RX ORDER — FENTANYL CITRATE 50 UG/ML
INJECTION, SOLUTION INTRAMUSCULAR; INTRAVENOUS PRN
Status: DISCONTINUED | OUTPATIENT
Start: 2020-08-27 | End: 2020-08-27

## 2020-08-27 RX ORDER — TRAMADOL HYDROCHLORIDE 50 MG/1
50 TABLET ORAL EVERY 6 HOURS PRN
COMMUNITY
End: 2020-11-13 | Stop reason: ALTCHOICE

## 2020-08-27 RX ORDER — FENTANYL CITRATE 50 UG/ML
INJECTION, SOLUTION INTRAMUSCULAR; INTRAVENOUS PRN
Status: DISCONTINUED | OUTPATIENT
Start: 2020-08-27 | End: 2020-08-27 | Stop reason: SDUPTHER

## 2020-08-27 RX ORDER — SODIUM CHLORIDE 9 MG/ML
INJECTION, SOLUTION INTRAVENOUS CONTINUOUS
Status: DISCONTINUED | OUTPATIENT
Start: 2020-08-27 | End: 2020-08-27 | Stop reason: HOSPADM

## 2020-08-27 RX ORDER — MIDAZOLAM HYDROCHLORIDE 1 MG/ML
INJECTION INTRAMUSCULAR; INTRAVENOUS PRN
Status: DISCONTINUED | OUTPATIENT
Start: 2020-08-27 | End: 2020-08-27 | Stop reason: SDUPTHER

## 2020-08-27 RX ADMIN — FENTANYL CITRATE 25 MCG: 50 INJECTION, SOLUTION INTRAMUSCULAR; INTRAVENOUS at 07:47

## 2020-08-27 RX ADMIN — PROPOFOL 50 MG: 10 INJECTION, EMULSION INTRAVENOUS at 07:47

## 2020-08-27 RX ADMIN — MIDAZOLAM HYDROCHLORIDE 2 MG: 1 INJECTION, SOLUTION INTRAMUSCULAR; INTRAVENOUS at 07:42

## 2020-08-27 RX ADMIN — PROPOFOL 10 MG: 10 INJECTION, EMULSION INTRAVENOUS at 07:53

## 2020-08-27 RX ADMIN — CEFAZOLIN 2 G: 10 INJECTION, POWDER, FOR SOLUTION INTRAVENOUS at 07:42

## 2020-08-27 RX ADMIN — PROPOFOL 10 MG: 10 INJECTION, EMULSION INTRAVENOUS at 07:49

## 2020-08-27 RX ADMIN — SODIUM CHLORIDE: 9 INJECTION, SOLUTION INTRAVENOUS at 07:15

## 2020-08-27 RX ADMIN — PROPOFOL 10 MG: 10 INJECTION, EMULSION INTRAVENOUS at 07:51

## 2020-08-27 RX ADMIN — FENTANYL CITRATE 25 MCG: 50 INJECTION, SOLUTION INTRAMUSCULAR; INTRAVENOUS at 07:49

## 2020-08-27 RX ADMIN — Medication 60 MG: at 07:47

## 2020-08-27 ASSESSMENT — PULMONARY FUNCTION TESTS
PIF_VALUE: 0
PIF_VALUE: 1
PIF_VALUE: 0
PIF_VALUE: 1
PIF_VALUE: 0

## 2020-08-27 ASSESSMENT — PAIN DESCRIPTION - DESCRIPTORS
DESCRIPTORS: DISCOMFORT
DESCRIPTORS: ACHING;BURNING

## 2020-08-27 ASSESSMENT — PAIN SCALES - GENERAL
PAINLEVEL_OUTOF10: 2

## 2020-08-27 ASSESSMENT — PAIN DESCRIPTION - PAIN TYPE: TYPE: SURGICAL PAIN

## 2020-08-27 ASSESSMENT — PAIN DESCRIPTION - FREQUENCY: FREQUENCY: CONTINUOUS

## 2020-08-27 ASSESSMENT — PAIN - FUNCTIONAL ASSESSMENT: PAIN_FUNCTIONAL_ASSESSMENT: 0-10

## 2020-08-27 ASSESSMENT — PAIN DESCRIPTION - LOCATION: LOCATION: BREAST

## 2020-08-27 ASSESSMENT — PAIN DESCRIPTION - ORIENTATION: ORIENTATION: RIGHT;OUTER;UPPER

## 2020-08-27 NOTE — PROGRESS NOTES
Discharge criteria have been met. Instructions given to pt and family who verbalize understanding. Discharged.

## 2020-08-27 NOTE — ANESTHESIA POSTPROCEDURE EVALUATION
Department of Anesthesiology  Postprocedure Note    Patient: Sharon Trimble  MRN: [id number]  YOB: 1960  Date of evaluation: 8/27/2020  Time:  8:13 AM     Procedure Summary     Date:  08/27/20 Room / Location:  [facility]    Anesthesia Start:  Dondra Cari Anesthesia Stop:  0813    Procedure:  RE-EXCISION ANTERIOR MARGIN RIGHT BREAST (Right Breast) Diagnosis:  (RIGHT BREAST CNACER)    Surgeon:  Carlton Pena MD Responsible Provider:  Taylor Etienne MD    Anesthesia Type:  MAC ASA Status:  2          Anesthesia Type: No value filed. Tobias Phase I: Tobias Score: 10    Tobias Phase II:      Last vitals: Reviewed and per EMR flowsheets.        Anesthesia Post Evaluation    Patient location during evaluation: bedside  Patient participation: complete - patient participated  Level of consciousness: awake and alert  Airway patency: patent  Nausea & Vomiting: no nausea and no vomiting  Complications: no  Cardiovascular status: hemodynamically stable  Respiratory status: room air and spontaneous ventilation  Hydration status: euvolemic

## 2020-08-27 NOTE — PROGRESS NOTES
ADMITTED TO [facility] AND ORIENTED TO UNIT. SCDS ON. FALL AND ALLERGY BANDS ON. PT VERBALIZED APPROVAL FOR FIRST NAME, LAST INITIAL AND PHYSICIAN NAME ON UNIT WHITEBOARD.

## 2020-08-27 NOTE — ANESTHESIA PRE PROCEDURE
Department of Anesthesiology  Preprocedure Note       Name:  Car Hawk   Age:  56 y.o.  :  1960                                          MRN:  [id number]         Date:  2020      Surgeon: Sheila Irwin):  Kirsten Montero MD    Procedure: Procedure(s):  RE-EXCISION ANTERIOR MARGIN RIGHT BREAST    Medications prior to admission:   Prior to Admission medications    Medication Sig Start Date End Date Taking? Authorizing Provider   traMADol (ULTRAM) 50 MG tablet Take 50 mg by mouth every 6 hours as needed for Pain. Yes Historical Provider, MD   thyroid (ARMOUR THYROID) 15 MG tablet Take 15 mg by mouth daily Alternate every other day with 30 mg   Yes Historical Provider, MD       Current medications:    Current Facility-Administered Medications   Medication Dose Route Frequency Provider Last Rate Last Dose    0.9 % sodium chloride infusion   Intravenous Continuous Kirsten Montero  mL/hr at 20 0715      sodium chloride flush 0.9 % injection 10 mL  10 mL Intravenous 2 times per day Kirsten Montero MD        sodium chloride flush 0.9 % injection 10 mL  10 mL Intravenous PRN Kirsten Montero MD         Facility-Administered Medications Ordered in Other Encounters   Medication Dose Route Frequency Provider Last Rate Last Dose    propofol injection    PRN Krystle Green RN   10 mg at 20 0753    fentaNYL (SUBLIMAZE) injection    PRN Krystle Green RN   25 mcg at 20 0749    lidocaine injection    PRN Krystle Green RN   60 mg at 20 0747       Allergies:     Allergies   Allergen Reactions    Acetaminophen      Gi upset    Aspartame And Phenylalanine     Bextra [Valdecoxib]      urinary retention    Doxycycline      GI    Magnesium Citrate      Internal  Pain, nausea, vomiting, diarrhea    Ciprofloxacin Palpitations    Flagyl [Metronidazole] Palpitations    Neomycin Rash       Problem List:    Patient Active Problem List   Diagnosis Code    Hypothyroidism due to acquired atrophy of thyroid E03.4    Tired R53.83    Irritable bowel syndrome with diarrhea K58.0    Slow transit constipation K59.01    Nonintractable headache R51    Body aches R52    Osteopenia M85.80    Family history of scleroderma Z82.69    Family history of autoimmune disorder Z83.2    Ureteral stone N20.1    Renal stones N20.0    Carcinoma of upper-outer quadrant of right breast in female, estrogen receptor negative ([facility] [address] C50.411, Z17.1    S/P lumpectomy and sln bx, right breast Z98.890       Past Medical History:        Diagnosis Date    Acquired autoimmune hypothyroidism     Breast cancer ([facility] [address] 2020    Diverticulitis     Dr. Sameera Sharif History of kidney stones     Intestinal metaplasia of gastric mucosa         Osteopenia        Past Surgical History:        Procedure Laterality Date    BREAST LUMPECTOMY Right 2020    RIGHT BREAST LUMPECTOMY AND SENTINEL LYMPH NODE BIOPSY performed by Caro Lopez MD at [address] [place] [address],[address]  [phone]    x2    COLONOSCOPY  2018    Dr. Gooden Praveen Left 2017    With left ureteroscopy, basket retrieval of stone fragments, left ureterenoscopy, basket retrieval of renal stones, and left ureter stent placement-- [address]  [phone]    pelvic    [facility] US GUID NDL BIOPSY RIGHT  2020    [facility] [address] [place] BIOPSY RIGHT 2020 [address]    UPPER GASTROINTESTINAL ENDOSCOPY      US GUIDED NEEDLE LOC OF RIGHT BREAST  2020    US GUIDED NEEDLE LOC OF RIGHT BREAST 2020 [facility]       Social History:    Social History     Tobacco Use    Smoking status: Former Smoker     Packs/day: 1.00     Years: 20.00     Pack years: 20.00     Types: Cigarettes     Last attempt to quit: 1997     Years since quittin.6    Smokeless tobacco: Never Used   Substance Use Topics    Alcohol use:  No                                Counseling given: Not Answered      Vital Signs (Current):   Vitals:    08/27/20 0650 08/27/20 0654   BP: (!) 111/54    Pulse: 72    Resp: 16    Temp: 97.1 °F (36.2 °C)    TempSrc: Temporal    SpO2: 97%    Weight:  135 lb (61.2 kg)   Height:  5' 1\" (1.549 m)                                              BP Readings from Last 3 Encounters:   08/27/20 (!) 111/54   08/26/20 122/62   08/18/20 122/68       NPO Status: Time of last liquid consumption: 0500(sip with med)                        Time of last solid consumption: 2200                        Date of last liquid consumption: 08/27/20                        Date of last solid food consumption: 08/26/20    BMI:   Wt Readings from Last 3 Encounters:   08/27/20 135 lb (61.2 kg)   08/26/20 134 lb (60.8 kg)   08/18/20 134 lb (60.8 kg)     Body mass index is 25.51 kg/m². CBC:   Lab Results   Component Value Date    WBC 8.3 07/27/2018    RBC 4.26 07/27/2018    RBC 0-5 06/01/2017    HGB 14.3 08/12/2020    HCT 43.9 08/12/2020    MCV 94.1 07/27/2018    RDW 12.9 06/01/2017     07/27/2018       CMP:   Lab Results   Component Value Date     07/27/2018    K 4.3 07/27/2018    CL 98 07/27/2018    CO2 25 07/27/2018    BUN 15 07/27/2018    CREATININE 0.6 07/27/2018    LABGLOM >90 07/27/2018    GLUCOSE 111 07/27/2018    CALCIUM 10.0 07/27/2018    BILITOT NEGATIVE 06/01/2017       POC Tests: No results for input(s): POCGLU, POCNA, POCK, POCCL, POCBUN, POCHEMO, POCHCT in the last 72 hours.     Coags: No results found for: PROTIME, INR, APTT    HCG (If Applicable): No results found for: PREGTESTUR, PREGSERUM, HCG, HCGQUANT     ABGs: No results found for: PHART, PO2ART, OEB0KBY, UYI8ZBR, BEART, J1DJMOHZ     Type & Screen (If Applicable):  No results found for: LABABO, LABRH    Drug/Infectious Status (If Applicable):  No results found for: HIV, HEPCAB    COVID-19 Screening (If Applicable):   Lab Results   Component Value Date    COVID19 NOT DETECTED 08/24/2020         Anesthesia Evaluation    Airway: Mallampati: II       Mouth opening: > = 3 FB Dental:          Pulmonary:       (-) COPD                           Cardiovascular:                      Neuro/Psych:               GI/Hepatic/Renal:             Endo/Other:    (+) hypothyroidism::., .                 Abdominal:           Vascular:                                        Anesthesia Plan      MAC     ASA 2             Anesthetic plan and risks discussed with patient. Plan discussed with CRNA.                   Carolan Meckel, MD   8/27/2020

## 2020-08-27 NOTE — TELEPHONE ENCOUNTER
As a follow up to a referral made by the [facility], this phone call is being made to Timmy Sparks for spiritual and emotional care. No one answered the phone and a message was left on the answering machine. A follow up call will be made in a few weeks. Spiritual and emotional care for patient and family is available as needed.

## 2020-08-27 NOTE — OP NOTE
[address] [address]  Operative Report    PATIENT NAME: Irene Huerta  MEDICAL RECORD NO. [id number]  SURGEON: Nacho Cabello MD, FACS  Primary Care Physician: Ronda Clancy DO  Date: 8/27/2020, 8:10 AM     PROCEDURE PERFORMED: Reexcision of skin margin  PREOPERATIVE DIAGNOSIS: Status post lumpectomy for stage Ia breast cancer with positive skin margin  POSTOPERATIVE DIAGNOSIS: Same, path pending  SURGEON:  Nacho Cabello MD, FACS  ANESTHESIA:  Monitored Local Anesthesia with Sedation and local  ANESTHESIA:  10 OF 0.5% Marcaine  ESTIMATED BLOOD LOSS:  10  ml  SPECIMEN: new skin margin  COMPLICATIONS:  None; patient tolerated the procedure well. DRAINS: none  DISPOSITION: sds  CONDITION: stable      Narrative:    Patient brought to operating room sedation of anesthesia right axilla was identified prepped and draped sterilely with ChloraPrep 3 minutes allowed to pass a previous skin glue was peeled off the incision was infiltrated with a mixture of half percent Marcaine 1% lidocaine. Total of 10 cc were used. Incision was opened the seroma was aspirated the new skin margin was identified it was infiltrated with local anesthesia and then a 1 cm thickness skin margin taken. New margin marked with a 2-0 silk. Hemostasis assured with electrocautery. The wound was then closed with a 4-0 Vicryl running subcuticular stitch.   There were clips in place in the lumpectomy cavity for radiation planning

## 2020-08-27 NOTE — H&P
[facility]  History and Physical Update    Pt Name: Gay Tafoya  MRN: [id number]  YOB: 1960  Date of evaluation: 8/27/2020    [x] I have examined the patient and reviewed the H&P/Consult and there are no changes to the patient or plans.     [] I have examined the patient and reviewed the H&P/Consult and have noted the following changes:        Nona Presumhali  Electronically signed 8/27/2020 at 7:41 AM

## 2020-08-28 ENCOUNTER — TELEPHONE (OUTPATIENT)
Dept: SURGERY | Age: 60
End: 2020-08-28

## 2020-08-31 ENCOUNTER — TELEPHONE (OUTPATIENT)
Dept: SURGERY | Age: 60
End: 2020-08-31

## 2020-08-31 ENCOUNTER — OFFICE VISIT (OUTPATIENT)
Dept: SURGERY | Age: 60
End: 2020-08-31

## 2020-08-31 VITALS
DIASTOLIC BLOOD PRESSURE: 80 MMHG | HEIGHT: 61 IN | BODY MASS INDEX: 25.49 KG/M2 | OXYGEN SATURATION: 99 % | RESPIRATION RATE: 18 BRPM | TEMPERATURE: 97.9 F | WEIGHT: 135 LBS | HEART RATE: 112 BPM | SYSTOLIC BLOOD PRESSURE: 138 MMHG

## 2020-08-31 PROCEDURE — 99024 POSTOP FOLLOW-UP VISIT: CPT | Performed by: SURGERY

## 2020-08-31 NOTE — TELEPHONE ENCOUNTER
S/p 8/27/20 re-excision anterior margin right breast    Pt called wondering if she can be seen earlier than her appt Wednesday 9/2. States she has a red bumpy rash along her incision, clear drainage, no foul odors, no fevers. She states that she is concerned for infection but no s/sx of infection. Possible seroma? Please advise.

## 2020-08-31 NOTE — PROGRESS NOTES
Kamila Mccain. Marcelo Case MD [facility]  General Surgery  Postprocedure Evaluation in Office  Pt Name: Carole Berkowitz  Date of Birth 1960   Today's Date: 8/31/2020  Medical Record Number: [id number]  Referring Provider: No ref. provider found  Primary Care Provider: Von Hernandez DO  Chief Complaint   Patient presents with    Post-Op Check     s/p Re-excision of skin margins-right breast 8/18/2020     ASSESSMENT      Problem List Items Addressed This Visit     Carcinoma of upper-outer quadrant of right breast in female, estrogen receptor negative ([facility] [address] - Primary    S/P lumpectomy and sln bx, right breast      Cancer Staging  Carcinoma of upper-outer quadrant of right breast in female, estrogen receptor negative ([facility] [address]  Staging form: Breast, AJCC 8th Edition  - Clinical stage from 8/12/2020: Stage IA (cT1b, cN0, cM0, G1, ER+, KY+, HER2-) - Signed by Marley Casillas MD on 8/12/2020  - Pathologic stage from 8/24/2020: Stage IA (pT1b, pN0(sn), cM0, G1, ER+, KY+, HER2-) - Signed by Marley Casillas MD on 8/28/2020  - Pathologic: No stage assigned - Nereyda Hernandez   Past Medical History:   Diagnosis Date    Acquired autoimmune hypothyroidism     Breast cancer ([facility] [address] 08/2020    Diverticulitis     Dr. Shine Cuff History of kidney stones     Intestinal metaplasia of gastric mucosa         Osteopenia 2006        PLANS       1. Cont to observe the wound  2. Follow up: No follow-ups on file. 3.    Orders Placed This Encounter:  No orders of the defined types were placed in this encounter. SUBJECTIVE   Subjective   Stephanie Guzmán is seen today for  follow-up. She is 13 day(s) status post Right  lumpectomy right with sentinel lymph node biopsy (date - 8/18/2020) and 4 days from re excision of skin margin which was negative(8/27/2020).   Her pathology was infiltrating ductal carcinoma T1b - Tumor more than 0.5 cm but not more than 1 cm in greatest dimension pN0 - No regional lymph node metastasis histologically, no additional examination for isolated tumor cells (ITC). M0 - No clinical or radiographic evidence of distant metastases. Her stage was IA - T1*, N0, M0. The  estrogen receptor positive progesterone receptor positiveHer2/Evita protein/oncogene not overexpressedKi 67 - growth factor low. She called this morning to be seen early because she said she had a rash around the incision. No fever no signs of infection but wanted to be seen early. Her reexcision pathology was negative for residual disease. Past Medical History:   Diagnosis Date    Acquired autoimmune hypothyroidism     Breast cancer ([facility] [address] 08/2020    Diverticulitis     Dr. Nan Ortiz History of kidney stones     Intestinal metaplasia of gastric mucosa         Osteopenia 2006     Past Surgical History  Past Surgical History:   Procedure Laterality Date    BREAST LUMPECTOMY Right 8/18/2020    RIGHT BREAST LUMPECTOMY AND SENTINEL LYMPH NODE BIOPSY performed by Kirsten Montero MD at [address] [place] [address] Right 8/27/2020    RE-EXCISION ANTERIOR MARGIN RIGHT BREAST performed by Kirsten Montero MD at [address] [place] [address],[address]  [phone]    x2    COLONOSCOPY  08/2018    Dr. Joann Menezes Left 06/02/2017    With left ureteroscopy, basket retrieval of stone fragments, left ureterenoscopy, basket retrieval of renal stones, and left ureter stent placement--Dr Odalis Porter LAPAROSCOPY  [phone]    pelvic    [facility] US GUID NDL BIOPSY RIGHT  8/4/2020    [facility] [address] RIGHT 8/4/2020 [address]    UPPER GASTROINTESTINAL ENDOSCOPY  2010    US GUIDED NEEDLE LOC OF RIGHT BREAST  8/18/2020    US GUIDED NEEDLE LOC OF RIGHT BREAST 8/18/2020 [facility]     Medications  Current Outpatient Medications on File Prior to Visit   Medication Sig Dispense Refill    traMADol (ULTRAM) 50 MG tablet Take 50 mg by mouth every 6 hours as needed for Pain.       thyroid (ARMOUR THYROID) 15 MG tablet Take 15 mg by mouth daily Alternate every other day with 30 mg       No current facility-administered medications on file prior to visit.       Allergies  Allergies   Allergen Reactions    Acetaminophen      Gi upset    Aspartame And Phenylalanine     Bextra [Valdecoxib]      urinary retention    Doxycycline      GI    Magnesium Citrate      Internal  Pain, nausea, vomiting, diarrhea    Ciprofloxacin Palpitations    Flagyl [Metronidazole] Palpitations    Neomycin Rash     Social History  Social History     Socioeconomic History    Marital status:      Spouse name: Not on file    Number of children: 2    Years of education: Not on file    Highest education level: Not on file   Occupational History    Not on file   Social Needs    Financial resource strain: Not on file    Food insecurity     Worry: Not on file     Inability: Not on file   [employer] needs     Medical: Not on file     Non-medical: Not on file   Tobacco Use    Smoking status: Former Smoker     Packs/day: 1.00     Years: 20.00     Pack years: 20.00     Types: Cigarettes     Last attempt to quit: 1997     Years since quittin.6    Smokeless tobacco: Never Used   Substance and Sexual Activity    Alcohol use: No    Drug use: No    Sexual activity: Not on file   Lifestyle    Physical activity     Days per week: Not on file     Minutes per session: Not on file    Stress: Not on file   Relationships    Social connections     Talks on phone: Not on file     Gets together: Not on file     Attends Jainism service: Not on file     Active member of club or organization: Not on file     Attends meetings of clubs or organizations: Not on file     Relationship status: Not on file    Intimate partner violence     Fear of current or ex partner: Not on file     Emotionally abused: Not on file     Physically abused: Not on file     Forced sexual activity: Not on file   Other Topics Concern    Not on file   Social History Narrative    Not on file Health Screening Exams  Health Maintenance   Topic Date Due    Hepatitis C screen  1960    HIV screen  05/28/1973    DTaP/Tdap/Td vaccine (1 - Tdap) 05/28/1977    Cervical cancer screen  05/28/1979    Lipid screen  05/28/1998    Diabetes screen  05/28/1998    Shingles Vaccine (1 of 2) 05/28/2008    Colon cancer screen colonoscopy  05/28/2008    Flu vaccine (1) 09/01/2020    Breast cancer screen  08/18/2021    Hepatitis A vaccine  Aged Out    Hepatitis B vaccine  Aged Out    Hib vaccine  Aged Out    Meningococcal (ACWY) vaccine  Aged Out    Pneumococcal 0-64 years Vaccine  Aged Out     Review of Systems  History obtained from the patient. Constitutional: Denies any fever, chills, fatigue. Wound: Denies any rash, skin color changes or wound problems. Resp: Denies any cough, shortness of breath. CV: Denies any chest pain, orthopnea or syncope. GI: Denies any nausea, vomiting, blood in the stool, constipation or diarrhea. OBJECTIVE      VITALS:  height is 5' 1\" (1.549 m) and weight is 135 lb (61.2 kg). Her temporal temperature is 97.9 °F (36.6 °C). Her blood pressure is 138/80 and her pulse is 112. Her respiration is 18 and oxygen saturation is 99%. CONSTITUTIONAL: Alert and oriented times 3, no acute distress and cooperative to examination. SKIN: Skin color, texture, turgor normal. No rashes or lesions. NECK: Soft, trachea midline and straight  BREAST: The Right breast has a transverse axillary scar from lumpectomy and sln bx. No signs of infection. .Has some rxn to the glue it looks like with itching and redness, glue removed and also instructed to use alcohol to remove the skin prep as well. NEUROLOGIC: No sensory or motor nerve irritation  EXTREMITIES: no cyanosis, no clubbing and no edema  . Luly Melara.  Montgomery Burkitt MD FACS  8/31/2020  9:48 AM

## 2020-09-01 ENCOUNTER — APPOINTMENT (OUTPATIENT)
Dept: PHYSICAL THERAPY | Age: 60
End: 2020-09-01
Payer: COMMERCIAL

## 2020-09-01 ENCOUNTER — TELEPHONE (OUTPATIENT)
Dept: ONCOLOGY | Age: 60
End: 2020-09-01

## 2020-09-01 NOTE — TELEPHONE ENCOUNTER
patient asking if her new pt appt on 9/2/20 is too soon after her second surgery on 8/27/20? Please advise and thanks.

## 2020-09-02 ENCOUNTER — OFFICE VISIT (OUTPATIENT)
Dept: ONCOLOGY | Age: 60
End: 2020-09-02
Payer: COMMERCIAL

## 2020-09-02 ENCOUNTER — HOSPITAL ENCOUNTER (OUTPATIENT)
Dept: INFUSION THERAPY | Age: 60
Discharge: HOME | End: 2020-09-02
Payer: COMMERCIAL

## 2020-09-02 VITALS
BODY MASS INDEX: 25.94 KG/M2 | SYSTOLIC BLOOD PRESSURE: 121 MMHG | TEMPERATURE: 96.5 F | HEIGHT: 61 IN | RESPIRATION RATE: 16 BRPM | HEART RATE: 74 BPM | DIASTOLIC BLOOD PRESSURE: 65 MMHG | OXYGEN SATURATION: 99 % | WEIGHT: 137.4 LBS

## 2020-09-02 PROCEDURE — 99211 OFF/OP EST MAY X REQ PHY/QHP: CPT

## 2020-09-02 PROCEDURE — 99205 OFFICE O/P NEW HI 60 MIN: CPT | Performed by: INTERNAL MEDICINE

## 2020-09-02 NOTE — PROGRESS NOTES
[facility], [address] [facility],[address] 200  Sergei Hinton 83  Dept: [phone]  Dept Fax: [phone]: [phone]    Visit Date:9/2/2020     Margaret Cardoza is a 56 y.o. female who presents today for:   Chief Complaint   Patient presents with    New Patient     breast CA        HPI:   Mrs. Huey Proctor is a 56-year-old postmenopausal patients with newly diagnosed stage Ia right breast cancer. She presents to the medical oncology clinic to discuss postsurgical treatment. He had an annual screening mammogram on July 31, 2020 that showed 0.9 cm round high density spiculated mass in the right breast.  She underwent core biopsy on August 4, 2020 that showed low grade invasive ductal cancer of the right breast.  Tumor cells were estrogen receptor  Positive, progesterone receptor positive. HER-2/deysi receptors stain 2+ by IHC. FISH was negative for HER-2 amplification. Prior to the biopsy she complained of breast pain in the right breast and denied galactorrhea. Subsequently she met with the surgeon Dr. Hidla Joy and after discussing her surgical treatment options she decided to proceed with lumpectomy and sentinel lymph node mapping and biopsy. Pathology report from the surgery on August 18, 2020 showed:  A. Right breast, lumpectomy:    Invasive ductal adenocarcinoma, Lucas grade 1.    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    Skin (anterior) margin focally positive for invasive carcinoma.    DCIS 1mm from skin (anterior) margin.    Pathologic Stage: pT1b, pN0(sn). B. [place] lymph node, right, excision:    Negative for malignancy (0/1). Risk assessment: none. She has not been on previous estrogen therapy. Felt something in right axillary area. Due to positive inferior margin on August 27, 2020 she underwent reexcision. Pathology report showed:  Breast, right, additional margin:    Biopsy site changes.    Microcalcifications within benign breast tissue.  No evidence of residual malignancy. Presents the medical oncology clinic to discuss postsurgical treatment. Ms. Horace Torres maternal grandmother was diagnosed with pancreatic cancer in her [de-identified]. A maternal uncle was also diagnosed with pancreatic cancer. Otherwise, there are no known cancers in her maternal extended family. Ms. Atul Ugarte elected to decline genetic testing today.  She would like additional time to consider her options and discuss the testing with her family members before making a final decision  HPI   Past Medical History:   Diagnosis Date    Acquired autoimmune hypothyroidism     Breast cancer ([facility] [address] 08/2020    Diverticulitis     Dr. Rl Huynh History of kidney stones     Intestinal metaplasia of gastric mucosa         Osteopenia 2006      Past Surgical History:   Procedure Laterality Date    BREAST LUMPECTOMY Right 8/18/2020    RIGHT BREAST LUMPECTOMY AND SENTINEL LYMPH NODE BIOPSY performed by Marley Casillas MD at [facility] LUMPECTOMY Right 8/27/2020    RE-EXCISION ANTERIOR MARGIN RIGHT BREAST performed by Marley Casillas MD at [address] [place] [address],[address]  [phone],8586    x2    COLONOSCOPY  08/2018    Dr. Ramos Members Left 06/02/2017    With left ureteroscopy, basket retrieval of stone fragments, left ureterenoscopy, basket retrieval of renal stones, and left ureter stent placement-- [address]  9818,3660    pelvic    TAMIKO US GUID NDL BIOPSY RIGHT  8/4/2020    TAMIKO [address] RIGHT 8/4/2020 [facility]    UPPER GASTROINTESTINAL ENDOSCOPY  2010    US GUIDED NEEDLE LOC OF RIGHT BREAST  8/18/2020    US GUIDED NEEDLE LOC OF RIGHT BREAST 8/18/2020 [facility]      Family History   Problem Relation Age of Onset    Other Mother         fuches corneal dystrophy, scleroderma, osteoporosis    High Blood Pressure Mother         pulmonary htn    High Blood Pressure Father     Cancer Father         lung    Diabetes Maternal Grandmother     Cancer Maternal Grandmother         pancreatic    Other Maternal Grandfather         fuches corneal dystrophy    Other Sister         fuches corneal dystrophy    Other Sister         fuches corneal dystrophy    Heart Disease Sister         pacemaker    Other Sister         fuches corneal dystrophy, hypothyroidism    Other Sister         hypothyroidism, reynauds      Social History     Tobacco Use    Smoking status: Former Smoker     Packs/day: 1.00     Years: 20.00     Pack years: 20.00     Types: Cigarettes     Last attempt to quit: 1997     Years since quittin.7    Smokeless tobacco: Never Used   Substance Use Topics    Alcohol use: No      Current Outpatient Medications   Medication Sig Dispense Refill    traMADol (ULTRAM) 50 MG tablet Take 50 mg by mouth every 6 hours as needed for Pain.  thyroid (ARMOUR THYROID) 15 MG tablet Take 15 mg by mouth daily Alternate every other day with 30 mg       No current facility-administered medications for this visit.        Allergies   Allergen Reactions    Acetaminophen      Gi upset    Aspartame And Phenylalanine     Bextra [Valdecoxib]      urinary retention    Doxycycline      GI    Magnesium Citrate      Internal  Pain, nausea, vomiting, diarrhea    Ciprofloxacin Palpitations    Flagyl [Metronidazole] Palpitations    Neomycin Rash      Health Maintenance   Topic Date Due    Hepatitis C screen  1960    HIV screen  1973    DTaP/Tdap/Td vaccine (1 - Tdap) 1977    Cervical cancer screen  1979    Lipid screen  1998    Diabetes screen  1998    Shingles Vaccine (1 of 2) 2008    Colon cancer screen colonoscopy  2008    Flu vaccine (1) 2020    Breast cancer screen  2021    Hepatitis A vaccine  Aged Out    Hepatitis B vaccine  Aged Out    Hib vaccine  Aged Out    Meningococcal (ACWY) vaccine  Aged Out    Pneumococcal 0-64 years Vaccine  Aged [place] Subjective:   Review of Systems   Constitutional: Negative for activity change, appetite change, fatigue and fever. HENT: Negative for congestion, dental problem, facial swelling, hearing loss, mouth sores, nosebleeds, sore throat, tinnitus and trouble swallowing. Eyes: Negative for discharge and visual disturbance. Respiratory: Negative for cough, chest tightness, shortness of breath and wheezing. Cardiovascular: Negative for chest pain, palpitations and leg swelling. Gastrointestinal: Negative for abdominal distention, abdominal pain, blood in stool, constipation, diarrhea, nausea, rectal pain and vomiting. Endocrine: Negative for cold intolerance, polydipsia and polyuria. Genitourinary: Negative for decreased urine volume, difficulty urinating, dysuria, flank pain, hematuria and urgency. Musculoskeletal: Negative for arthralgias, back pain, gait problem, joint swelling, myalgias and neck stiffness. Skin: Negative for color change, rash and wound. Neurological: Negative for dizziness, tremors, seizures, speech difficulty, weakness, light-headedness, numbness and headaches. Hematological: Negative for adenopathy. Does not bruise/bleed easily. Psychiatric/Behavioral: Negative for confusion and sleep disturbance. The patient is not nervous/anxious. Objective:   Physical Exam  Vitals signs reviewed. Constitutional:       General: She is not in acute distress. Appearance: She is well-developed. HENT:      Head: Normocephalic. Mouth/Throat:      Pharynx: No oropharyngeal exudate. Eyes:      General: No scleral icterus. Right eye: No discharge. Left eye: No discharge. Pupils: Pupils are equal, round, and reactive to light. Neck:      Musculoskeletal: Normal range of motion and neck supple. Thyroid: No thyromegaly. Vascular: No JVD. Trachea: No tracheal deviation. Cardiovascular:      Rate and Rhythm: Normal rate.       Heart sounds: Normal heart sounds. No murmur. No friction rub. No gallop. Pulmonary:      Effort: Pulmonary effort is normal. No respiratory distress. Breath sounds: Normal breath sounds. No stridor. No wheezing or rales. Chest:      Chest wall: No tenderness. Breasts:         Right: Skin change (Lumpectomy incision is nicely healed) present. Abdominal:      General: Bowel sounds are normal. There is no distension. Palpations: Abdomen is soft. There is no mass. Tenderness: There is no abdominal tenderness. There is no rebound. Musculoskeletal: Normal range of motion. Comments: Good range of motion in all four extremities. Lymphadenopathy:      Cervical: No cervical adenopathy. Skin:     General: Skin is warm. Findings: No erythema or rash. Neurological:      Mental Status: She is alert and oriented to person, place, and time. Cranial Nerves: No cranial nerve deficit. Motor: No abnormal muscle tone. Deep Tendon Reflexes: Reflexes are normal and symmetric. Psychiatric:         Behavior: Behavior normal.         Thought Content: Thought content normal.         Judgment: Judgment normal.         /65 (Site: Left Upper Arm, Position: Sitting, Cuff Size: Medium Adult)   Pulse 74   Temp 96.5 °F (35.8 °C) (Infrared)   Resp 16   Ht 5' 1\" (1.549 m)   Wt 137 lb 6.4 oz (62.3 kg)   SpO2 99%   BMI 25.96 kg/m²      ECOG status is 0    Imaging studies and labs: [facility] Breast Specimen    Result Date: 8/18/2020  IMPRESSION: SPECIMEN 1. A single surgical specimen was received which includes the localization wire, spiculated mass and biopsy marker clip. #[id number] - [facility] BREAST SPECIMEN SPECIMEN: 8/18/2020 CLINICAL: Path Specimen Radiograph. Right breast cancer- tribell clip. Correlation is made to exams dated:  8/18/2020 mammogram and 8/18/2020 localization - [address] . [facility].   A single surgical specimen was received which includes the localization wire, spiculated mass and biopsy marker clip. Josselyn Paula M.D.  pgk/:8/18/2020 12:25:25  Imaging Technologist: Merlinda Rhea RT(R)(M), [address] [address]    [phone] [id number]     Nm Inj Lymphoscintigram    Result Date: 8/18/2020  PROCEDURE: NM INJ LYMPHOSCINTIGRAM CLINICAL INFORMATION: Carcinoma of the right breast; radiopharmaceutical injection for sentinel node localization. TECHNIQUE: Assistance was provided for a radiopharmaceutical injection performed by Dr. Shanice Santiago. A total of 0.95 mCi technetium 99m unfiltered sulfur colloid was injected. No images were obtained. Assistance for radiopharmaceutical injection. Final report electronically signed by Dr. Chandler Hearn on 8/18/2020 10:31 AM    [facility] Digital Diagnostic W Or Wo Cad Right    Result Date: 8/18/2020  IMPRESSION: Mammogram BI-RADS: n/a 1. There is a localization wire traversing the tribell marker clip in the right axillary tail. On sonographic imaging this traverses the center of the mass. #[id number] - [facility] DIGITAL DIAGNOSTIC W OR WO CAD RIGHT UNILATERAL RIGHT DIGITAL DIAGNOSTIC MAMMOGRAM WITH CAD: 8/18/2020 CLINICAL: Invasive ductal carcinoma right breast.  Comparison is made to exams dated:  8/18/2020 localization, 8/4/2020 mammogram, 8/4/2020 ultrasound biopsy, 7/31/2020 ultrasound, and 7/31/2020 mammogram - [address] [address]. The tissue of the right breast is heterogeneously dense. This may  lower the sensitivity of mammography. Current study was also evaluated with a Computer Aided Detection (CAD) system. There is a localization wire traversing the tribell marker clip in the right axillary tail. On sonographic imaging this traverses  the center of the mass. Josselyn Paula M.D.          pgk/:8/18/2020 10:04:00  Imaging Technologist: Claudene Springer RT(R)(M), [address] [address]      Us [address] Breast Loc Device 1st Lesion Right    Result Date: 8/18/2020  IMPRESSION: WIRE LOCALIZATION 1.  Wire localization for the mass in the right axillary tail was successful with no apparent post procedure complications. #[id number] -  PLACE BREAST LOC DEVICE 1ST LESION RIGHT ULTRASOUND GUIDED WIRE LOCALIZATION RIGHT BREAST WITH POST DIGITAL MAMMOGRAPHIC IMAGIN2020 CLINICAL: Invasive ductal right breast.  A wire localization using ultrasound guidance was performed for the mass located in the right axillary tail. This was described on the previous mammography and ultrasound reports. The skin was  prepped in the usual manner. Local anesthetic was administered to the access site. The localization was approached from the lateral aspect. A wire was inserted adjacent to the marker through an introducer device under ultrasound guidance. As a separate procedure in a separate room with a dedicated machine, post placement digital mammographic imaging demonstrates the tip traverses adjacent to the marker. Madelene Halsted M.D.  pgk/:2020 10:01:30  Imaging Technologist: Mili CHAVARRIA(R)(KODAK)[facility]    [address] Us Guided Breast Biopsy W Loc Device 1st Lesion Right    Result Date: 2020  THIS REPORT HAS BEEN AMENDED. AMENDMENT: 2020   Chiquita Wylie M.D. The pathology report has been reviewed, revealing INVASIVE DUCTAL  CARCINOMA. Surgical consultation and oncologic is recommended. A breast MRI may also be considered. IMPRESSION: ULTRASOUND GUIDED BIOPSY Ultrasound guided biopsy of the mass in the right axillary tail was successful. Waiting for pathology results. A final report will be issued when these become available. #[id number] - [facility] US GUID NDL BIOPSY RIGHT ULTRASOUND GUIDED BIOPSY RIGHT BREAST WITH MARKING DEVICE INSERTED: 2020 CLINICAL: Rt axillary tail mass, Tribell clip. An ultrasound guided biopsy using real-time ultrasound was performed for the concerning spiculated irregular shaped mass located in the right axillary tail. This was described on the previous ultrasound report. adjuvant hormonal therapy with Aromatase Inhibitor. Adjuvant endocrine therapy reduces breast cancer recurrence and breast cancer mortality in postmenopausal women, when compared with tamoxifen  AIs result in a more substantial reduction in recurrence rates during treatment and lower breast cancer mortality both during and after treatment. Possible side effects of AI were reviewed with the patient. They include:    - Decreased bone mineral density.          - Hypercholesterolemia. - Musculoskeletal symptoms. I spent 60 minutes face-to-face with the patient and her family. Greater than 50% of time was spent on counseling and coordinating her care. Face to face counseling included prognosis, risks, benefits of treatment, compliance and risk reduction. Diagnosis Orders   1. Breast cancer, stage 1, estrogen receptor positive, right ([facility] [address]     2. Family history of pancreatic cancer     3. Status post right breast lumpectomy          Plan:   Return in about 16 days (around 9/18/2020). Orders Placed:   No orders of the defined types were placed in this encounter. Medications Prescribed:   No orders of the defined types were placed in this encounter. Discussed use, benefit, and side effectsof prescribed medications. All patient questions answered. Pt voiced understanding. Instructed to continue current medications, diet and exercise. Patient agreed with treatment plan. Follow up as directed.     Electronically signed by Clearance Rinne, MD on 9/2/20 at 2:06 PM EDT

## 2020-09-03 ENCOUNTER — TELEPHONE (OUTPATIENT)
Dept: SURGERY | Age: 60
End: 2020-09-03

## 2020-09-03 RX ORDER — AMOXICILLIN AND CLAVULANATE POTASSIUM 875; 125 MG/1; MG/1
1 TABLET, FILM COATED ORAL 2 TIMES DAILY
Qty: 14 TABLET | Refills: 0 | Status: SHIPPED | OUTPATIENT
Start: 2020-09-03 | End: 2020-09-10

## 2020-09-03 NOTE — TELEPHONE ENCOUNTER
Spoke with Dr. Nunu Durbin who felt it looked more allergic than infected when evaluated Monday, but send script for augmentin 875 mg BID x7 days. Dispense 14, no refills. This was sent to pt's pharmacy, pt aware and will call the office back if further concerns.

## 2020-09-04 ENCOUNTER — TELEPHONE (OUTPATIENT)
Dept: SURGERY | Age: 60
End: 2020-09-04

## 2020-09-04 NOTE — TELEPHONE ENCOUNTER
Pt calling the office back again stating her right axillary redness has gotten less red, but she feels the area has not shrunk at all. Still no warmth or tenderness. She did send a picture which was forwarded to Dr. Rinaldo Shone email. Did discuss with Dr. Tal Boss there is improvement and to continue antibiotics. Pt will call the office if any further concerns.

## 2020-09-08 NOTE — PROGRESS NOTES
Yuniel Hubbard. Maria E Martinez MD [facility]  General Surgery  Postprocedure Evaluation in Office  Pt Name: Seamus Dow  Date of Birth 1960   Today's Date: 9/8/2020  Medical Record Number: [id number]  Referring Provider: No ref. provider found  Primary Care Provider: Antonietta Pal DO  Chief Complaint   Patient presents with    Post-Op Check     s/p Re-excision of skin margins-right breast 8/18/2020-redness started on Augmentin 9/4/2020     ASSESSMENT      Problem List Items Addressed This Visit     Carcinoma of upper-outer quadrant of right breast in female, estrogen receptor negative ([facility] [address] - Primary    S/P lumpectomy and sln bx, right breast      Cancer Staging  Carcinoma of upper-outer quadrant of right breast in female, estrogen receptor negative ([facility] [address]  Staging form: Breast, AJCC 8th Edition  - Clinical stage from 8/12/2020: Stage IA (cT1b, cN0, cM0, G1, ER+, MO+, HER2-) - Signed by Nellie Ojeda MD on 8/12/2020  - Pathologic stage from 8/24/2020: Stage IA (pT1b, pN0(sn), cM0, G1, ER+, MO+, HER2-) - Signed by Nellie Ojeda MD on 8/28/2020  - Pathologic: No stage assigned - Zachary Potts   Past Medical History:   Diagnosis Date    Acquired autoimmune hypothyroidism     Breast cancer ([facility] [address] 08/2020    Diverticulitis     Dr. Sky Espinoza History of kidney stones     Intestinal metaplasia of gastric mucosa         Osteopenia 2006        PLANS       1. Cont to observe the wound  2. Follow up: No follow-ups on file. 3.    Orders Placed This Encounter:  No orders of the defined types were placed in this encounter. SUBJECTIVE   Subjective   Toma Nichols is seen today for  follow-up. She is 20 day(s) status post Right  lumpectomy right with sentinel lymph node biopsy (date - 8/18/2020) and 13 days from re excision of skin margin which was negative(8/27/2020).   Her pathology was infiltrating ductal carcinoma T1b - Tumor more than 0.5 cm but not more than 1 cm in greatest dimension pN0 - No regional lymph node metastasis histologically, no additional examination for isolated tumor cells (ITC). M0 - No clinical or radiographic evidence of distant metastases. Her stage was IA - T1*, N0, M0. The  estrogen receptor positive progesterone receptor positiveHer2/Evita protein/oncogene not overexpressedKi 67 - growth factor low. She called this morning to be seen early because she said she had a rash around the incision. No fever no signs of infection but wanted to be seen early. Her reexcision pathology was negative for residual disease. When we saw her last visit, she had some localized itching and redness at rhe site, which looked allergic to glue, the glue was removed.  The redness go wider, and we started her on Augmentin, the redness area had gotten smaller since starting antibiotics  Past Medical History:   Diagnosis Date    Acquired autoimmune hypothyroidism     Breast cancer ([facility] [address] 08/2020    Diverticulitis     Dr. Shante Leon History of kidney stones     Intestinal metaplasia of gastric mucosa         Osteopenia 2006     Past Surgical History  Past Surgical History:   Procedure Laterality Date    BREAST LUMPECTOMY Right 8/18/2020    RIGHT BREAST LUMPECTOMY AND SENTINEL LYMPH NODE BIOPSY performed by Masha Mendez MD at [address] [place] [address] Right 8/27/2020    RE-EXCISION ANTERIOR MARGIN RIGHT BREAST performed by Masha Mendez MD at [address] [place] [address],[address]  9523,3406    x2    COLONOSCOPY  08/2018    Dr. Sudheer Corea Left 06/02/2017    With left ureteroscopy, basket retrieval of stone fragments, left ureterenoscopy, basket retrieval of renal stones, and left ureter stent placement--Dr Giovanna Carrasco LAPAROSCOPY  [id number]    pelvic    TAMIKO US GUID NDL BIOPSY RIGHT  8/4/2020    TAMIKO [address] RIGHT 8/4/2020 [facility]    UPPER GASTROINTESTINAL ENDOSCOPY  2010    US GUIDED NEEDLE LOC OF RIGHT BREAST  8/18/2020    US GUIDED NEEDLE LOC OF RIGHT BREAST 2020 [facility]     Medications  Current Outpatient Medications on File Prior to Visit   Medication Sig Dispense Refill    amoxicillin-clavulanate (AUGMENTIN) 875-125 MG per tablet Take 1 tablet by mouth 2 times daily for 7 days 14 tablet 0    traMADol (ULTRAM) 50 MG tablet Take 50 mg by mouth every 6 hours as needed for Pain.  thyroid (ARMOUR THYROID) 15 MG tablet Take 15 mg by mouth daily Alternate every other day with 30 mg       No current facility-administered medications on file prior to visit.       Allergies  Allergies   Allergen Reactions    Acetaminophen      Gi upset    Aspartame And Phenylalanine     Bextra [Valdecoxib]      urinary retention    Doxycycline      GI    Magnesium Citrate      Internal  Pain, nausea, vomiting, diarrhea    Ciprofloxacin Palpitations    Flagyl [Metronidazole] Palpitations    Neomycin Rash     Social History  Social History     Socioeconomic History    Marital status:      Spouse name: Not on file    Number of children: 2    Years of education: Not on file    Highest education level: Not on file   Occupational History    Not on file   Social Needs    Financial resource strain: Not on file    Food insecurity     Worry: Not on file     Inability: Not on file   [employer] needs     Medical: Not on file     Non-medical: Not on file   Tobacco Use    Smoking status: Former Smoker     Packs/day: 1.00     Years: 20.00     Pack years: 20.00     Types: Cigarettes     Last attempt to quit: 1997     Years since quittin.7    Smokeless tobacco: Never Used   Substance and Sexual Activity    Alcohol use: No    Drug use: No    Sexual activity: Not on file   Lifestyle    Physical activity     Days per week: Not on file     Minutes per session: Not on file    Stress: Not on file   Relationships    Social connections     Talks on phone: Not on file     Gets together: Not on file     Attends Baptism service: Not on file     Active member of club or organization: Not on file     Attends meetings of clubs or organizations: Not on file     Relationship status: Not on file    Intimate partner violence     Fear of current or ex partner: Not on file     Emotionally abused: Not on file     Physically abused: Not on file     Forced sexual activity: Not on file   Other Topics Concern    Not on file   Social History Narrative    Not on file     [address] Maintenance   Topic Date Due    Hepatitis C screen  1960    HIV screen  05/28/1973    DTaP/Tdap/Td vaccine (1 - Tdap) 05/28/1977    Cervical cancer screen  05/28/1979    Lipid screen  05/28/1998    Diabetes screen  05/28/1998    Shingles Vaccine (1 of 2) 05/28/2008    Colon cancer screen colonoscopy  05/28/2008    Flu vaccine (1) 09/01/2020    Breast cancer screen  08/18/2021    Hepatitis A vaccine  Aged Out    Hepatitis B vaccine  Aged Out    Hib vaccine  Aged Out    Meningococcal (ACWY) vaccine  Aged Out    Pneumococcal 0-64 years Vaccine  Aged Out     Review of Systems  History obtained from the patient. Constitutional: Denies any fever, chills, fatigue. Wound: Denies any rash, skin color changes or wound problems. Resp: Denies any cough, shortness of breath. CV: Denies any chest pain, orthopnea or syncope. GI: Denies any nausea, vomiting, blood in the stool, constipation or diarrhea. OBJECTIVE      VITALS:  vitals were not taken for this visit. CONSTITUTIONAL: Alert and oriented times 3, no acute distress and cooperative to examination. SKIN: Skin color, texture, turgor normal. No rashes or lesions. NECK: Soft, trachea midline and straight  BREAST: The Right breast has a transverse axillary scar from lumpectomy and sln bx. No signs of infection. .There is faint erythema but lesions on skin, scaly dry which look like contact dermatitis resoling. Overall she thinks its better.     NEUROLOGIC: No sensory or motor nerve

## 2020-09-09 ENCOUNTER — OFFICE VISIT (OUTPATIENT)
Dept: SURGERY | Age: 60
End: 2020-09-09

## 2020-09-09 VITALS
RESPIRATION RATE: 20 BRPM | OXYGEN SATURATION: 99 % | SYSTOLIC BLOOD PRESSURE: 118 MMHG | TEMPERATURE: 97.2 F | DIASTOLIC BLOOD PRESSURE: 70 MMHG | HEART RATE: 90 BPM

## 2020-09-09 PROCEDURE — 99024 POSTOP FOLLOW-UP VISIT: CPT | Performed by: SURGERY

## 2020-09-10 ENCOUNTER — HOSPITAL ENCOUNTER (OUTPATIENT)
Dept: PHYSICAL THERAPY | Age: 60
Setting detail: THERAPIES SERIES
Discharge: HOME | End: 2020-09-10
Payer: COMMERCIAL

## 2020-09-10 PROCEDURE — 97140 MANUAL THERAPY 1/> REGIONS: CPT

## 2020-09-10 PROCEDURE — 97110 THERAPEUTIC EXERCISES: CPT

## 2020-09-17 ENCOUNTER — HOSPITAL ENCOUNTER (OUTPATIENT)
Dept: PHYSICAL THERAPY | Age: 60
Setting detail: THERAPIES SERIES
Discharge: HOME | End: 2020-09-17
Payer: COMMERCIAL

## 2020-09-17 PROCEDURE — 97140 MANUAL THERAPY 1/> REGIONS: CPT

## 2020-09-17 NOTE — PROGRESS NOTES
[address] [facility]  ONCOLOGY REHABILITATION  PHYSICAL THERAPY  [x] DAILY NOTE [] PROGRESS NOTE [] DISCHARGE NOTE    [x] [facility]     Date: 2020  Patient Name:  Gean Meckel  : 1960  MRN: [id number]       Referring Practitioner Adama Betancur MD with Dr. Abelardo Collazo   Diagnosis Malignant neoplasm of upper-outer quadrant of right female breast [C50.411]    Treatment Diagnosis R shoulder tightness, Z71.9   Date of Evaluation 20    Additional Pertinent History 20 R IDC ER/MD+, HER2-, 20 R Lumpectomy with SLNB with 0/1 node +, 20 re-excision; thyroid disease       Functional Outcome Measure Used O: QuickDASH   Functional Outcome Score    11.4% eval (20)        Insurance: Primary: Payor: [place] /    / ,   Secondary:    Authorization Information: Modalities ok, no aquatics or massage   Visit # 3, 3/10 for progress note   Visits Allowed: Unlimited   Recertification Date: 83   Physician Follow-Up: 20 Jocelyn        SUBJECTIVE: States hypersensitivity of upper arm and skin rash have both greatly improved since last session. Reports most difficulty with Bye-Bye's for flexion as she has a pulling sensation throughout the right breast and if she supports the breast, she is able to gain ROM.       TREATMENT   Precautions:    Pain: 0/10    X in shaded column indicates activity completed today   Modalities Parameters/  Location  Notes                     Manual Therapy Time/Technique  Notes   PORi protocol to R upper quadrant for MLD and MFR 45 minutes x Particular attention paid to R breast and axilla region with LymphaTouch used at 125 mmHg with small treatment cup using lift and twist tech due to breast tenderness               Exercise/Intervention   Notes   Retro shoulder rolls 1x10      Scapular retractions 1x10      Wall walks 1x10      Pec stretches in doorway 3x15 seconds   Hands cheek height and with elbows extended   Bye-Bye's  3 Flexion and abduction                                               Specific Interventions for Next Treatment:  Lymphedema education, PORi protocol for gentle manual lymphatic drainage and myofascial release, gentle stretches, strengthening as needed    Activity Tolerance: Patient tolerated treatment well. Reports less tender by end of session. ASSESSMENT:  Assessment: Pt with increased breast tenderness with palpation and found pt to be avoiding achieving a stretch through the breast tissue with shoulder mobility. Focused session on softening the breast tissue and assisting in remodeling of the scar tissue to allow greater tolerance of stretching tissue with normal, overhead activities. GOALS:  Patient Goal: Get back to normal quickly, avoid lymphedema     Short Term Goals to be met in 12 weeks:  1. See LTGs      Long Term Goals to be met in 12 weeks:   1. Pt to demo R shoulder PROM flexion improved from 166 deg to to 174 deg for improved ease with reaching overhead. 2. Pt to demo R shoulder strength returned to 4+/5 after surgery for return to yoga. 3. Pt to demo R shoulder PROM abduction returned to 180 deg after surgery for return to yoga. 4. Pt to demo QuickDASH score improved from 11.4% to 9% for improved ease with daily tasks. 5. Pt to demo R UE lymphedema measures controlled at <144 cm with independent lymphedema risk reduction strategies for safety after discharge. Patient Education: To trial child's pose to left and yoga binding activities to encourage pec and sub scap stretches.   Education Outcome: Verbalized understanding  Education Barriers: None    PLAN: Continue established plan of care      Time In 1045   Time Out 1130   Timed Code Minutes: 45 min   Total Treatment Time: 45 min       Electronically Signed by: Denise Wilcox PT, DPT, [facility] [id number] 9/17/2020

## 2020-09-18 ENCOUNTER — HOSPITAL ENCOUNTER (OUTPATIENT)
Dept: INFUSION THERAPY | Age: 60
Discharge: HOME | End: 2020-09-18
Payer: COMMERCIAL

## 2020-09-18 ENCOUNTER — TELEPHONE (OUTPATIENT)
Dept: SPIRITUAL SERVICES | Facility: CLINIC | Age: 60
End: 2020-09-18

## 2020-09-18 ENCOUNTER — OFFICE VISIT (OUTPATIENT)
Dept: ONCOLOGY | Age: 60
End: 2020-09-18
Payer: COMMERCIAL

## 2020-09-18 ENCOUNTER — CLINICAL DOCUMENTATION (OUTPATIENT)
Dept: ONCOLOGY | Age: 60
End: 2020-09-18

## 2020-09-18 VITALS
WEIGHT: 136.8 LBS | RESPIRATION RATE: 16 BRPM | SYSTOLIC BLOOD PRESSURE: 115 MMHG | OXYGEN SATURATION: 97 % | TEMPERATURE: 97.8 F | HEIGHT: 61 IN | DIASTOLIC BLOOD PRESSURE: 67 MMHG | BODY MASS INDEX: 25.83 KG/M2 | HEART RATE: 81 BPM

## 2020-09-18 PROCEDURE — 99214 OFFICE O/P EST MOD 30 MIN: CPT | Performed by: INTERNAL MEDICINE

## 2020-09-18 PROCEDURE — 99211 OFF/OP EST MAY X REQ PHY/QHP: CPT

## 2020-09-18 NOTE — PROGRESS NOTES
Name: Car Hawk  : 1960  MRN: [id number]    Oncology Navigation Follow-Up Note    Contact Type:  Medical Oncology    Subjective: Patient here to review OncoDx and plan of care with Dr. Bello Razo. Pt seen prior to Dr. Bello Razo. Notes: Voices concerns that radiation hasn't begun. 3 weeks since most recent breast surgery. Reassured she is still within recommended time frame. Explained to need to have complete healing of surgical site. No needs identified at this time.   Gown provided for breast exam.    Electronically signed by Garcia Steven RN on 2020 at 1:26 PM

## 2020-09-23 ENCOUNTER — HOSPITAL ENCOUNTER (OUTPATIENT)
Dept: PHYSICAL THERAPY | Age: 60
Setting detail: THERAPIES SERIES
Discharge: HOME | End: 2020-09-23
Payer: COMMERCIAL

## 2020-09-23 PROCEDURE — 97140 MANUAL THERAPY 1/> REGIONS: CPT

## 2020-09-23 PROCEDURE — 97535 SELF CARE MNGMENT TRAINING: CPT

## 2020-09-24 ASSESSMENT — ENCOUNTER SYMPTOMS
COLOR CHANGE: 0
EYE DISCHARGE: 0
FACIAL SWELLING: 0
RECTAL PAIN: 0
WHEEZING: 0
SORE THROAT: 0
COLOR CHANGE: 0
SHORTNESS OF BREATH: 0
COUGH: 0
BLOOD IN STOOL: 0
CHEST TIGHTNESS: 0
CONSTIPATION: 0
BLOOD IN STOOL: 0
VOMITING: 0
BACK PAIN: 0
TROUBLE SWALLOWING: 0
SORE THROAT: 0
ABDOMINAL DISTENTION: 0
NAUSEA: 0
DIARRHEA: 0
VOMITING: 0
CONSTIPATION: 0
CHEST TIGHTNESS: 0
ABDOMINAL PAIN: 0
EYE DISCHARGE: 0
ABDOMINAL DISTENTION: 0
DIARRHEA: 0
ABDOMINAL PAIN: 0
TROUBLE SWALLOWING: 0
SHORTNESS OF BREATH: 0
COUGH: 0
WHEEZING: 0
NAUSEA: 0
BACK PAIN: 0
FACIAL SWELLING: 0
RECTAL PAIN: 0

## 2020-09-24 NOTE — PROGRESS NOTES
[facility], [address] [facility],[address]  [place] [facility]  Dept: [phone]  Dept Fax: [phone]: [phone]    Visit Date:9/18/2020     Kpi Leon is a 56 y.o. female who presents today for:   Chief Complaint   Patient presents with    Results     RV onco DX        HPI:   Mrs. Charisma Jain is a 56-year-old postmenopausal patients with newly diagnosed stage Ia right breast cancer. She presents to the medical oncology clinic to discuss postsurgical treatment. He had an annual screening mammogram on July 31, 2020 that showed 0.9 cm round high density spiculated mass in the right breast.  She underwent core biopsy on August 4, 2020 that showed low grade invasive ductal cancer of the right breast.  Tumor cells were estrogen receptor  Positive, progesterone receptor positive. HER-2/deysi receptors stain 2+ by IHC. FISH was negative for HER-2 amplification. Prior to the biopsy she complained of breast pain in the right breast and denied galactorrhea. Subsequently she met with the surgeon Dr. Annette Parsons and after discussing her surgical treatment options she decided to proceed with lumpectomy and sentinel lymph node mapping and biopsy. Pathology report from the surgery on August 18, 2020 showed:  A. Right breast, lumpectomy:    Invasive ductal adenocarcinoma, Ousmane grade 1.    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    Skin (anterior) margin focally positive for invasive carcinoma.    DCIS 1mm from skin (anterior) margin.    Pathologic Stage: pT1b, pN0(sn). B. Scott lymph node, right, excision:    Negative for malignancy (0/1). Risk assessment: none. She has not been on previous estrogen therapy. Felt something in right axillary area. Due to positive inferior margin on August 27, 2020 she underwent reexcision. Pathology report showed:  Breast, right, additional margin:    Biopsy site changes.    Microcalcifications within benign breast tissue.  No evidence of residual malignancy. Presents the medical oncology clinic to discuss postsurgical treatment. Ms. Sarah Trinh maternal grandmother was diagnosed with pancreatic cancer in her [de-identified]. A maternal uncle was also diagnosed with pancreatic cancer. Otherwise, there are no known cancers in her maternal extended family. Ms. Get Menjivar elected to decline genetic testing today. She would like additional time to consider her options and discuss the testing with her family members before making a final decision. Interim history on September 18, 2020:  Patient presents to the medical oncology clinic to review results of Oncotype DX assay and to discuss further management. Denies any new complaints since last visit with me.       HPI   Past Medical History:   Diagnosis Date    Acquired autoimmune hypothyroidism     Breast cancer ([facility] [address] 08/2020    Diverticulitis     Dr. Truong Luis History of kidney stones     Intestinal metaplasia of gastric mucosa         Osteopenia 2006      Past Surgical History:   Procedure Laterality Date    BREAST LUMPECTOMY Right 8/18/2020    RIGHT BREAST LUMPECTOMY AND SENTINEL LYMPH NODE BIOPSY performed by George Mahan MD at [address] [place] [address] Right 8/27/2020    RE-EXCISION ANTERIOR MARGIN RIGHT BREAST performed by George Mahan MD at [address] [place] [address],[address]  [id number]    x2    COLONOSCOPY  08/2018    Dr. Yoo [facility] Left 06/02/2017    With left ureteroscopy, basket retrieval of stone fragments, left ureterenoscopy, basket retrieval of renal stones, and left ureter stent placement--Dr Moore [facility] LAPAROSCOPY  [id number]    pelvic    TAMIKO US GUID NDL BIOPSY RIGHT  8/4/2020    TAMIKO [address] RIGHT 8/4/2020 [facility]    UPPER GASTROINTESTINAL ENDOSCOPY  2010    US GUIDED NEEDLE LOC OF RIGHT BREAST  8/18/2020    US GUIDED NEEDLE LOC OF RIGHT BREAST 8/18/2020 [facility]      Family History   Problem Relation Age of Onset    Other Mother         fuches corneal dystrophy, scleroderma, osteoporosis    High Blood Pressure Mother         pulmonary htn    High Blood Pressure Father     Cancer Father         lung    Diabetes Maternal Grandmother     Cancer Maternal Grandmother         pancreatic    Other Maternal Grandfather         fuches corneal dystrophy    Other Sister         fuches corneal dystrophy    Other Sister         fuches corneal dystrophy    Heart Disease Sister         pacemaker    Other Sister         fuches corneal dystrophy, hypothyroidism    Other Sister         hypothyroidism, reynauds      Social History     Tobacco Use    Smoking status: Former Smoker     Packs/day: 1.00     Years: 20.00     Pack years: 20.00     Types: Cigarettes     Last attempt to quit: 1997     Years since quittin.7    Smokeless tobacco: Never Used   Substance Use Topics    Alcohol use: No      Current Outpatient Medications   Medication Sig Dispense Refill    traMADol (ULTRAM) 50 MG tablet Take 50 mg by mouth every 6 hours as needed for Pain.  thyroid (ARMOUR THYROID) 15 MG tablet Take 15 mg by mouth daily Alternate every other day with 30 mg       No current facility-administered medications for this visit.        Allergies   Allergen Reactions    Acetaminophen      Gi upset    Aspartame And Phenylalanine     Bextra [Valdecoxib]      urinary retention    Doxycycline      GI    Magnesium Citrate      Internal  Pain, nausea, vomiting, diarrhea    Ciprofloxacin Palpitations    Flagyl [Metronidazole] Palpitations    Neomycin Rash      Health Maintenance   Topic Date Due    Hepatitis C screen  1960    HIV screen  1973    DTaP/Tdap/Td vaccine (1 - Tdap) 1977    Cervical cancer screen  1979    Lipid screen  1998    Diabetes screen  1998    Shingles Vaccine (1 of 2) 2008    Colon cancer screen colonoscopy  2008    Flu vaccine (1) 2020    Breast cancer screen  08/18/2021    Hepatitis A vaccine  Aged Out    Hepatitis B vaccine  Aged Out    Hib vaccine  Aged Out    Meningococcal (ACWY) vaccine  Aged Out    Pneumococcal 0-64 years Vaccine  Aged Out        Subjective:   Review of Systems   Constitutional: Negative for activity change, appetite change, fatigue and fever. HENT: Negative for congestion, dental problem, facial swelling, hearing loss, mouth sores, nosebleeds, sore throat, tinnitus and trouble swallowing. Eyes: Negative for discharge and visual disturbance. Respiratory: Negative for cough, chest tightness, shortness of breath and wheezing. Cardiovascular: Negative for chest pain, palpitations and leg swelling. Gastrointestinal: Negative for abdominal distention, abdominal pain, blood in stool, constipation, diarrhea, nausea, rectal pain and vomiting. Endocrine: Negative for cold intolerance, polydipsia and polyuria. Genitourinary: Negative for decreased urine volume, difficulty urinating, dysuria, flank pain, hematuria and urgency. Musculoskeletal: Negative for arthralgias, back pain, gait problem, joint swelling, myalgias and neck stiffness. Skin: Negative for color change, rash and wound. Neurological: Negative for dizziness, tremors, seizures, speech difficulty, weakness, light-headedness, numbness and headaches. Hematological: Negative for adenopathy. Does not bruise/bleed easily. Psychiatric/Behavioral: Negative for confusion and sleep disturbance. The patient is not nervous/anxious. Objective:   Physical Exam  Vitals signs reviewed. Constitutional:       General: She is not in acute distress. Appearance: She is well-developed. HENT:      Head: Normocephalic. Mouth/Throat:      Pharynx: No oropharyngeal exudate. Eyes:      General: No scleral icterus. Right eye: No discharge. Left eye: No discharge. Pupils: Pupils are equal, round, and reactive to light.    Neck: 1629    BUN 15 07/27/2018 1629    CREATININE 0.6 07/27/2018 1629        Component Value Date/Time    CALCIUM 10.0 07/27/2018 1629    BILITOT NEGATIVE 06/01/2017 1006            Assessment/Plan:   1. Stage IA (cT1b, cN0, cM0, G1, ER+, GA+, HER2-) right breast cancer. The patient is diagnosed with stage I A hormone responsive right breast cancer. She is status post right lumpectomy with axillary LN biopsy. She presents to the [address] [place] [address] to discuss further management. The patient was informed that she has an early stage of breast cancer. Since her tumor was larger than 5 mm in size and she is younger than 79, my recommendation was to proceed with breast Oncotype DX assay. The Oncotype Dx 21-gene recurrence score (RS) is the best-validated prognostic and predictive assay to identify patients who are most and least likely to derive benefit from adjuvant chemotherapy. RS 25 or below identifies women with low risk of disease recurrence for whom chemotherapy is unlikely to provide a benefit. Her recurrence score came back as 16, pacing her at low risk group. Her risk of distant disease recurrence during the next 9 years is 4% with adjuvant hormonal therapy. Next step in breast cancer treatment will be postlumpectomy radiation treatment. Upon completion of radiation treatment to the patient will be placed on adjuvant hormonal therapy with Aromatase Inhibitor. Plan:   Return in about 8 weeks (around 11/13/2020). Orders Placed:   Orders Placed This Encounter   Procedures    Ambulatory referral to Radiation Oncology     Referral Priority:   Routine     Referral Type:   Consult for Advice and Opinion     Referral Reason:   Specialty Services Required     Requested Specialty:   Radiation Oncology     Number of Visits Requested:   1        Medications Prescribed:   No orders of the defined types were placed in this encounter. Discussed use, benefit, and side effectsof prescribed medications. All patient questions answered. Pt voiced understanding. Instructed to continue current medications, diet and exercise. Patient agreed with treatment plan. Follow up as directed.     Electronically signed by Lore Koch MD on 9/2/20 at 2:06 PM EDT

## 2020-09-28 ENCOUNTER — HOSPITAL ENCOUNTER (OUTPATIENT)
Dept: RADIATION ONCOLOGY | Age: 60
Discharge: HOME | End: 2020-09-28
Payer: COMMERCIAL

## 2020-09-28 VITALS
OXYGEN SATURATION: 98 % | HEIGHT: 61 IN | HEART RATE: 75 BPM | RESPIRATION RATE: 16 BRPM | BODY MASS INDEX: 25.68 KG/M2 | SYSTOLIC BLOOD PRESSURE: 114 MMHG | WEIGHT: 136 LBS | DIASTOLIC BLOOD PRESSURE: 59 MMHG | TEMPERATURE: 97.3 F

## 2020-09-28 PROCEDURE — 99202 OFFICE O/P NEW SF 15 MIN: CPT | Performed by: RADIOLOGY

## 2020-09-28 PROCEDURE — 99204 OFFICE O/P NEW MOD 45 MIN: CPT | Performed by: RADIOLOGY

## 2020-09-28 NOTE — PROGRESS NOTES
[facility]  9/28/2020    Chaperone: No    Advanced Directives     Power of  Living Will    Not on File Not on File      Patient states she does have healthcare POA, , Umair Elder.      Temp Readings from Last 2 Encounters:   09/28/20 97.3 °F (36.3 °C) (Infrared)   09/18/20 97.8 °F (36.6 °C) (Infrared)     BP Readings from Last 2 Encounters:   09/28/20 (!) 114/59   09/18/20 115/67     Pulse Readings from Last 2 Encounters:   09/28/20 75   09/18/20 81        Wt Readings from Last 3 Encounters:   09/28/20 136 lb (61.7 kg)   09/18/20 136 lb 12.8 oz (62.1 kg)   09/02/20 137 lb 6.4 oz (62.3 kg)      Lab Results   Component Value Date    CREATININE 0.6 07/27/2018     Lab Results   Component Value Date    BUN 15 07/27/2018       Mediport: no    Pacemaker/ICD: no    Previous XRT: no    Past Medical History:   Diagnosis Date    Acquired autoimmune hypothyroidism     Breast cancer ([facility] [address] 08/2020    Diverticulitis     Dr. Keshav Nicole History of kidney stones     Intestinal metaplasia of gastric mucosa         Osteopenia 2006     Past Surgical History:   Procedure Laterality Date    BREAST LUMPECTOMY Right 8/18/2020    RIGHT BREAST LUMPECTOMY AND SENTINEL LYMPH NODE BIOPSY performed by Pato Hanks MD at [address] [place] [address] Right 8/27/2020    RE-EXCISION ANTERIOR MARGIN RIGHT BREAST performed by Pato Hanks MD at [address]  [phone]    x2    COLONOSCOPY  08/2018    Dr. Mariela Santana Left 06/02/2017    With left ureteroscopy, basket retrieval of stone fragments, left ureterenoscopy, basket retrieval of renal stones, and left ureter stent placement--Dr Olga Lidia Macdonald LAPAROSCOPY  [id number]    pelvic    TAMIKO US GUID NDL BIOPSY RIGHT  8/4/2020    TAMIKO [address] RIGHT 8/4/2020 [facility]    UPPER GASTROINTESTINAL ENDOSCOPY  2010    US GUIDED NEEDLE LOC OF RIGHT BREAST  8/18/2020    US GUIDED NEEDLE LOC OF RIGHT BREAST 8/18/2020 [facility]       Allergies   Allergen Reactions    Acetaminophen      Gi upset    Aspartame And Phenylalanine     Bextra [Valdecoxib]      urinary retention    Doxycycline      GI    Magnesium Citrate      Internal  Pain, nausea, vomiting, diarrhea    Ciprofloxacin Palpitations    Flagyl [Metronidazole] Palpitations    Neomycin Rash        Current Outpatient Medications:     traMADol (ULTRAM) 50 MG tablet, Take 50 mg by mouth every 6 hours as needed for Pain., Disp: , Rfl:     thyroid (ARMOUR THYROID) 15 MG tablet, Take 15 mg by mouth daily Alternate every other day with 30 mg, Disp: , Rfl:       ADDITIONAL COMMENTS: Patient here for consult with Dr. Yovanny Watson.       Lion Apodaca BSN, RN

## 2020-09-29 ENCOUNTER — HOSPITAL ENCOUNTER (OUTPATIENT)
Dept: CT IMAGING | Age: 60
Discharge: HOME | End: 2020-09-29
Payer: COMMERCIAL

## 2020-09-29 ENCOUNTER — HOSPITAL ENCOUNTER (OUTPATIENT)
Dept: RADIATION ONCOLOGY | Age: 60
Discharge: HOME | End: 2020-09-29
Attending: RADIOLOGY
Payer: COMMERCIAL

## 2020-09-29 PROCEDURE — 77290 THER RAD SIMULAJ FIELD CPLX: CPT | Performed by: RADIOLOGY

## 2020-09-29 PROCEDURE — 77334 RADIATION TREATMENT AID(S): CPT | Performed by: RADIOLOGY

## 2020-09-29 PROCEDURE — 3209999900 CT GUIDE RADIATION THERAPY NO CHARGE

## 2020-09-29 PROCEDURE — 77263 THER RADIOLOGY TX PLNG CPLX: CPT | Performed by: RADIOLOGY

## 2020-09-29 NOTE — PROGRESS NOTES
of radiation therapy in the management of her early stage right breast cancer.         Past Medical History:   Diagnosis Date    Acquired autoimmune hypothyroidism     Breast cancer ([address] 08/2020    Diverticulitis     Dr. Jay Colon History of kidney stones     Intestinal metaplasia of gastric mucosa         Osteopenia 2006        Past Surgical History:   Procedure Laterality Date    BREAST LUMPECTOMY Right 8/18/2020    RIGHT BREAST LUMPECTOMY AND SENTINEL LYMPH NODE BIOPSY performed by Abdiel Villanueva MD at [facility] LUMPECTOMY Right 8/27/2020    RE-EXCISION ANTERIOR MARGIN RIGHT BREAST performed by Abdiel Villanueva MD at [facility]  [phone]    x2    COLONOSCOPY  08/2018    Dr. Ran Neal Left 06/02/2017    With left ureteroscopy, basket retrieval of stone fragments, left ureterenoscopy, basket retrieval of renal stones, and left ureter stent placement-- [address]  [phone]    pelvic    [address] [facility] BIOPSY RIGHT  8/4/2020    [address]AMI[address] RIGHT 8/4/2020 [facility]    UPPER GASTROINTESTINAL ENDOSCOPY  2010    US GUIDED NEEDLE LOC OF RIGHT BREAST  8/18/2020    US GUIDED NEEDLE LOC OF RIGHT BREAST 8/18/2020 [facility]       Family History   Problem Relation Age of Onset    Other Mother         fuches corneal dystrophy, scleroderma, osteoporosis    High Blood Pressure Mother         pulmonary htn    High Blood Pressure Father     Cancer Father         lung    Diabetes Maternal Grandmother     Cancer Maternal Grandmother         pancreatic    Other Maternal Grandfather         fuches corneal dystrophy    Other Sister         fuches corneal dystrophy    Other Sister         fuches corneal dystrophy    Heart Disease Sister         pacemaker    Other Sister         fuches corneal dystrophy, hypothyroidism    Other Sister         hypothyroidism, reynauds       Social History     Socioeconomic History  Marital status:      Spouse name: Jodie Goncalves Number of children: 2    Years of education: Not on file    Highest education level: Not on file   Occupational History    Not on file   Social Needs    Financial resource strain: Not on file    Food insecurity     Worry: Not on file     Inability: Not on file   [employer] needs     Medical: Not on file     Non-medical: Not on file   Tobacco Use    Smoking status: Former Smoker     Packs/day: 1.00     Years: 20.00     Pack years: 20.00     Types: Cigarettes     Last attempt to quit: 1997     Years since quittin.7    Smokeless tobacco: Never Used   Substance and Sexual Activity    Alcohol use: No    Drug use: No    Sexual activity: Not on file   Lifestyle    Physical activity     Days per week: Not on file     Minutes per session: Not on file    Stress: Not on file   Relationships    Social connections     Talks on phone: Not on file     Gets together: Not on file     Attends [employer] service: Not on file     Active member of club or organization: Not on file     Attends meetings of clubs or organizations: Not on file     Relationship status: Not on file    Intimate partner violence     Fear of current or ex partner: Not on file     Emotionally abused: Not on file     Physically abused: Not on file     Forced sexual activity: Not on file   Other Topics Concern    Not on file   Social History Narrative    Not on file     Exposure to Industrial/ environmental Carcinogens: no      ALLERGIES:   Allergies   Allergen Reactions    Acetaminophen      Gi upset    Aspartame And Phenylalanine     Bextra [Valdecoxib]      urinary retention    Doxycycline      GI    Magnesium Citrate      Internal  Pain, nausea, vomiting, diarrhea    Ciprofloxacin Palpitations    Flagyl [Metronidazole] Palpitations    Neomycin Rash          Current Outpatient Medications   Medication Sig Dispense Refill    traMADol (ULTRAM) 50 MG tablet Take 50 mg by mouth every 6 hours as needed for Pain.  thyroid (ARMOUR THYROID) 15 MG tablet Take 15 mg by mouth daily Alternate every other day with 30 mg       No current facility-administered medications for this encounter. No outpatient medications have been marked as taking for the 9/28/20 encounter [facility] Encounter) with Armen Osuna MD.          LABORATORY STUDIES:    CBC:   Lab Results   Component Value Date    WBC 8.3 07/27/2018    RBC 4.26 07/27/2018    RBC 0-5 06/01/2017    HGB 14.3 08/12/2020    HCT 43.9 08/12/2020    MCV 94.1 07/27/2018    MCH 32.2 07/27/2018    MCHC 34.2 07/27/2018    RDW 12.9 06/01/2017     07/27/2018    MPV 9.1 20/59/3894     MetabolicPanel:  Lab Results   Component Value Date     07/27/2018    K 4.3 07/27/2018    CL 98 07/27/2018    CO2 25 07/27/2018    BUN 15 07/27/2018    CREATININE 0.6 07/27/2018    LABGLOM >90 07/27/2018    GLUCOSE 111 07/27/2018    CALCIUM 10.0 07/27/2018    BILITOT NEGATIVE 06/01/2017         PATHOLOGY:   8/4/2020: Surgical pathology:  FINAL DIAGNOSIS:   Breast, right, mass, axillary tail, tribell clip, needle core biopsy:    Invasive ductal carcinoma, Ousmane score I.    Ductal carcinoma in situ, nuclear grade 1. ADDENDUM 8/12/20:   ERBB2 (HER2) by FISH (performed at [facility])    Result: Not Amplified     Please see separate report from reference laboratory for additional   information.      ADDENDUM 8/5/20:   BREAST BIOMARKERS*   Estrogen Receptor: (Clone SP1), [employer]       ( x ) Positive 100% of cells (>10% of cells)   Intensity of Staining:       ( x ) Strong     Progesterone Receptor: (Clone 1E2), [employer]       ( x ) Positive 95% of cells   Intensity of Staining:       ( x ) Intermediate     Ki-67 (clone 30-9)       Percentage of positive nuclei:  2%               Favorable <10%               Borderline 10-20%               Unfavorable >20%     HER2 Immunohistochemistry (Clone 4B5, [address])       (x) Equivocal (2+) - Weak to moderate complete membrane staining in       >10% of invasive tumor. 8/18/2020: Surgical pathology:  ADDENDUM REPORT 9/3/20, 9/21/20:   FINAL DIAGNOSIS:   A. Right breast, lumpectomy:    Invasive ductal adenocarcinoma, Ousmane grade 1.    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    Skin (anterior) margin focally positive for invasive carcinoma.    DCIS 1mm from skin (anterior) margin.    Pathologic Stage: pT1b, pN0(sn). B. Garrett lymph node, right, excision:    Negative for malignancy (0/1). COMMENT 9/3/20:  Selected slides and pathology report were reviewed by   Dr. Yoana Alexandra and archival tissue was selected for the following testing:   Oncotype DX (ordered by Dr. Nicko Godoy). ADDENDUM REPORT 9/21/20:    Oncotype DX Breast Cancer Assay (RT-PCR) performed by [employer]   Breast Cancer Recurrence Score:   16   Microscopic Examination:   Surgical Pathology Cancer Case Summary   INVASIVE CARCINOMA OF THE BREAST: Resection     Procedure   Excision (less than total mastectomy)     Specimen Laterality   Right     Tumor Site   Not specified     Tumor Size   Greatest dimension of largest invasive focus >1 mm: 9 mm     Histologic Type   Invasive carcinoma of no special type (ductal)     Histologic Grade (Ousmane Histologic Score)     Glandular (Acinar)/Tubular Differentiation   Score 2 (10% to 75% of tumor area forming glandular/tubular structures)       Nuclear Pleomorphism   Score 1 (nuclei small with little increase in size in comparison with   normal breast epithelial cells, regular outlines, uniform nuclear   chromatin, little variation in size)     Mitotic Rate   Score 1     Overall Grade   Grade 1 (scores of 3, 4, or 5)     Tumor Focality   Single focus of invasive carcinoma     Ductal Carcinoma In Situ (DCIS)   Present     Size (Extent) of DCIS   Number of blocks with DCIS: 4   Number of blocks examined: 7     Architectural Patterns   Comedo   Solid   Cribriform     Nuclear Grade   Grade II (intermediate)   Necrosis   Present, central (expansive \"comedo\" necrosis)     Lobular Carcinoma In Situ (LCIS)   Not identified     Margins     Invasive Carcinoma Margins   Positive for invasive carcinoma   Skin/Anterior, slide A5     DCIS Margins   Uninvolved by DCIS     Distance from closest margin (millimeters): 1 mm   Specify closest margin: Skin/Anterior     Regional Lymph Nodes   Uninvolved by tumor cells     Total Number of Lymph Nodes Examined: 1   Number of [place] Nodes Examined (if applicable): 1     Treatment Effect in the Breast   No known presurgical therapy     Lymphovascular Invasion   Not identified     Pathologic Stage Classification (pTNM, AJCC 8th Edition)     Primary Tumor (pT)   pT1b: Tumor >5 mm but <=10 mm in greatest dimension     Regional Lymph Nodes Modifier    (sn): [place] node(s) evaluated. If 6 or more nodes (sentinel or   nonsentinel) are removed, this modifier should not be used. Regional Lymph Nodes (pN)   pN0: No regional lymph node metastasis identified or ITCs only#     Additional Pathologic Findings   Microcalcification in benign and neoplastic glands. Breast Biomarker Testing Performed on Previous Biopsy   Testing Performed on Case Number: [id number]   [id number]   Estrogen Receptor (ER)   Positive 100%     Progesterone Receptor (PgR)   Positive 95%     HER2 (by immunohistochemistry)   Equivocal (Score 2+)     HER2 (by in situ hybridization)   Negative (not amplified)     Ki-67 percentage of positive nuclei:  2%     8/27/2020: Surgical pathology:  FINAL DIAGNOSIS:   Breast, right, additional margin:    Biopsy site changes.    Microcalcifications within benign breast tissue.    No evidence of residual malignancy.          RADIOLOGIC STUDIES:   7/31/2020: Mammogram, digital diagnostic bilateral:  IMPRESSION: Mammogram BI-RADS: 0: Incomplete: Needs Additional    Imaging 1. The 0.9 cm round equal to high density spiculated mass appears     indeterminate.  An ultrasound is recommended.      2. A targeted ultrasound was performed. 2020: Ultrasound breast limited right:  IMPRESSION: Ultrasound BI-RADS: 5: Highly Suggestive of    Malignancy    1. The 0.9 cm x 0.9 cm x 0.8 cm round mass appears highly    suggestive of malignancy.      2. The results were reviewed with the patient and the biopsy was    scheduled. 2020: Mammogram breast specimen:  IMPRESSION: SPECIMEN    1. A single surgical specimen was received which includes the    localization wire, spiculated mass and biopsy marker clip. REVIEW OF SYSTEMS:    Constitutional: Denies fever, chills, unintentional weight change. HEENT: Denies hearing or vision change. Denies dysphagia or odynophagia. Respiratory: Denies worsening dyspnea. Denies hemoptysis, coughing, wheezing or sputum production. Cardiovascular: Denies chest pain, palpitations, syncope. GI: Denies nausea or vomiting, hematemesis or rectal bleeding. Denies change in bowel habits. : Denies hematuria or dysuria. Musculoskeletal:   Extremities: Maintains extremity ROM; denies arm lymphedema. Metabolic/endocrine: denies new complaints. Neurological: Denies seizures, fainting or tremors. Integument: Denies rashes or ulcerations. PHYSICAL EXAMINATION:   VITAL SIGNS: height: 5 feet 0.98 inches. Weight: 136 pounds. Temperature: 36.3C. Pulse: 75.  Respirations: 16.  Blood pressure: 114/59. Pulse oximetry: O2 saturation 98% on room air. ECOG PERFORMANCE STATUS: 0  PAIN RATIN  GENERAL: Pleasant well-developed adult female. In no acute distress. Alert and oriented ×3; clear mentation with appropriate affect  SKIN: Warm and dry, without jaundice, or petechiae. HEENT: Normocephalic, atraumatic.   PERRL/EOMI; ears, nose and lips unremarkable on external examination; oral cavity/oropharyngeal mucosa moist without lesion or exudate  NECK:  No JVD; no palpable cervical adenopathy. THORAX: No palpable supraclavicular or axillary adenopathy;  LUNGS: clear bilaterally. CARDIAC: non-tachycardic  BREASTS: excellent postoperative cosmesis with good breast symmetry and only minimal architectural distortion of the right breast.  There is a slight increase along the lumpectomy incision in the right axillary tail area. There is no suspicious erythema or other finding to suggest postoperative infection or other surgical complication. ABDOMEN: Soft, nontender, bowel sounds present  EXTREMITIES: no clubbing or cyanosis. No lymphedema. NEUROLOGIC: No grossly apparent focal deficits. Cranial nerves grossly intact      IMPRESSION:  1. Stage IA receptor positive well-differentiated invasive ductal carcinoma of the right breast axillary tail region with low Oncotype DX score, status post breast conservation surgery. 2. The diagnosis, including AJCC staging was reviewed. Cleopatra Hensley received a copy of her AJCC staging information. 3. Treatment options and recommendations, including current clinical practice guidelines (NCCN) were reviewed in detail. This included review of the recommendation for initial diagnostic workup, local/regional treatment options and systemic treatment options/recommendations. We also reviewed the recommendations for surveillance/follow-up after completion of initial treatment. Cleopatra Hensley received a copy of the clinical practice that guideline pertaining to her situation. 4. Details of radiation therapy were then discussed. We reviewed the nature/mode of action of external beam radiation therapy, multiple steps involved in set up/planning, initiation and performance of the treatment. We reviewed logistics of treatment including expected number of treatments and typical amount of time spent in the department for each treatment.   We reviewed the need for weekly progress visits with physician and nursing staff, and the need for surveillance after completion of treatment. We then reviewed the expected and potential short and long-term side effects and risks of treatment. Rishi Finch had the opportunity to ask questions, and indicated that her questions were satisfactorily addressed. She indicated that she understood the discussion, and wishes to proceed with the recommended treatment. PLAN:  1. Schedule CT simulation  2. Schedule nursing teaching  3. Initiate radiation therapy after completion of treatment planning. 4. Continue care in medical oncology, surgery clinic and with other physicians/providers. 5. Continue surveillance and basic/preventive/supportive health care in accordance with clinical practice guidelines. Thank you for allowing my assistance in Toma Lind's care. Jose Wong MD   Radiation Oncology     ATTESTATION: 90 minutes spent actively reviewing patient data; 60 minutes face-to-face time,  >50% spent in counseling/discussion/education. CC: Juju Guardado; SAMANTA Verma Kinds  ACC: Tumor Registry: [facility]      This document was created using a voice-recognition program.  Computer generated transcription errors may be present.

## 2020-10-02 ENCOUNTER — HOSPITAL ENCOUNTER (OUTPATIENT)
Dept: RADIATION ONCOLOGY | Age: 60
Discharge: HOME | End: 2020-10-02
Payer: COMMERCIAL

## 2020-10-02 ENCOUNTER — TELEPHONE (OUTPATIENT)
Dept: SPIRITUAL SERVICES | Facility: CLINIC | Age: 60
End: 2020-10-02

## 2020-10-02 PROCEDURE — 99212 OFFICE O/P EST SF 10 MIN: CPT | Performed by: NURSE PRACTITIONER

## 2020-10-02 PROCEDURE — 99214 OFFICE O/P EST MOD 30 MIN: CPT | Performed by: NURSE PRACTITIONER

## 2020-10-02 NOTE — PROGRESS NOTES
[address] [facility]  [facility] [address]  Phone: [phone]   Toll Free: [phone]   Fax: [phone]    RADIATION ONCOLOGY EDUCATION    CHIEF COMPLAINT: Les Connelly presents to radiation oncology today for education prior to starting radiation treatment to right breast.      HISTORY OF PRESENT ILLNESS: Les Connelly was diagnosed with right breast in August 2020. She met with surgeon, medical and radiation oncologists. She received a recommendation for surgery followed by adjuvant radiation and antihormonal treatment. She was agreeable to the recommendation. Surgery (lumpectomy with SLN biopsy) was completed on 8/18/2020. She did undergo reexcision on 8/27/2020. EXAMINATION:   CONSTITUTIONAL: Les Connelly is a pleasant well developed adult female. NEUROLOGICAL: She is alert and oriented x3 in no acute distress. Clear mentation. MOOD/AFFECT: Appropriate for place and situation. PLAN:   1. Discussed with patient the steps involved with set up and initiation of radiation therapy (including treatment simulation and verification). Also reviewed the logistics of treatment (day, time and number of treatments). Expected and potential side effects (both short and long-term) were discussed in detail. 2. Skin care and moisturization instructions were discussed in detail. 3. Patient was agreeable to Massage and Dietician referrals which will be placed once treatment is initiated. Already current with PT.   4. Les Connelly had the opportunity to ask questions, and indicated that her questions were satisfactorily addressed. ATTESTATION: I spent 45 minutes face to face with her , more than half of that time was spent counseling and coordinating care.

## 2020-10-02 NOTE — TELEPHONE ENCOUNTER
A follow up call to [facility] for spiritual care. No answer, left a message. Follow up in a few weeks.

## 2020-10-08 PROCEDURE — 77295 3-D RADIOTHERAPY PLAN: CPT | Performed by: RADIOLOGY

## 2020-10-08 PROCEDURE — 77300 RADIATION THERAPY DOSE PLAN: CPT | Performed by: RADIOLOGY

## 2020-10-08 PROCEDURE — 77334 RADIATION TREATMENT AID(S): CPT | Performed by: RADIOLOGY

## 2020-10-09 PROCEDURE — 77334 RADIATION TREATMENT AID(S): CPT | Performed by: RADIOLOGY

## 2020-10-12 ENCOUNTER — HOSPITAL ENCOUNTER (OUTPATIENT)
Dept: RADIATION ONCOLOGY | Age: 60
Discharge: HOME | End: 2020-10-12
Attending: RADIOLOGY
Payer: COMMERCIAL

## 2020-10-12 PROCEDURE — 77280 THER RAD SIMULAJ FIELD SMPL: CPT | Performed by: RADIOLOGY

## 2020-10-12 PROCEDURE — 77412 RADIATION TX DELIVERY LVL 3: CPT | Performed by: RADIOLOGY

## 2020-10-12 PROCEDURE — 77334 RADIATION TREATMENT AID(S): CPT | Performed by: RADIOLOGY

## 2020-10-12 PROCEDURE — 77336 RADIATION PHYSICS CONSULT: CPT | Performed by: RADIOLOGY

## 2020-10-13 ENCOUNTER — HOSPITAL ENCOUNTER (OUTPATIENT)
Dept: RADIATION ONCOLOGY | Age: 60
Discharge: HOME | End: 2020-10-13
Attending: RADIOLOGY
Payer: COMMERCIAL

## 2020-10-13 PROCEDURE — G6002 STEREOSCOPIC X-RAY GUIDANCE: HCPCS | Performed by: RADIOLOGY

## 2020-10-13 PROCEDURE — 77387 GUIDANCE FOR RADJ TX DLVR: CPT | Performed by: RADIOLOGY

## 2020-10-13 PROCEDURE — 77412 RADIATION TX DELIVERY LVL 3: CPT | Performed by: RADIOLOGY

## 2020-10-14 ENCOUNTER — HOSPITAL ENCOUNTER (OUTPATIENT)
Dept: RADIATION ONCOLOGY | Age: 60
Discharge: HOME | End: 2020-10-14
Attending: RADIOLOGY
Payer: COMMERCIAL

## 2020-10-14 ENCOUNTER — CLINICAL DOCUMENTATION (OUTPATIENT)
Dept: NUTRITION | Age: 60
End: 2020-10-14

## 2020-10-14 PROCEDURE — 77412 RADIATION TX DELIVERY LVL 3: CPT | Performed by: RADIOLOGY

## 2020-10-14 PROCEDURE — G6002 STEREOSCOPIC X-RAY GUIDANCE: HCPCS | Performed by: RADIOLOGY

## 2020-10-14 PROCEDURE — 77387 GUIDANCE FOR RADJ TX DLVR: CPT | Performed by: RADIOLOGY

## 2020-10-15 ENCOUNTER — HOSPITAL ENCOUNTER (OUTPATIENT)
Dept: RADIATION ONCOLOGY | Age: 60
Discharge: HOME | End: 2020-10-15
Attending: RADIOLOGY
Payer: COMMERCIAL

## 2020-10-15 PROCEDURE — 77412 RADIATION TX DELIVERY LVL 3: CPT | Performed by: RADIOLOGY

## 2020-10-15 PROCEDURE — G6002 STEREOSCOPIC X-RAY GUIDANCE: HCPCS | Performed by: RADIOLOGY

## 2020-10-15 PROCEDURE — 77387 GUIDANCE FOR RADJ TX DLVR: CPT | Performed by: RADIOLOGY

## 2020-10-16 ENCOUNTER — HOSPITAL ENCOUNTER (OUTPATIENT)
Dept: RADIATION ONCOLOGY | Age: 60
Discharge: HOME | End: 2020-10-16
Attending: RADIOLOGY
Payer: COMMERCIAL

## 2020-10-16 PROCEDURE — 77427 RADIATION TX MANAGEMENT X5: CPT | Performed by: RADIOLOGY

## 2020-10-16 PROCEDURE — G6002 STEREOSCOPIC X-RAY GUIDANCE: HCPCS | Performed by: RADIOLOGY

## 2020-10-16 PROCEDURE — 77387 GUIDANCE FOR RADJ TX DLVR: CPT | Performed by: RADIOLOGY

## 2020-10-16 PROCEDURE — 77412 RADIATION TX DELIVERY LVL 3: CPT | Performed by: RADIOLOGY

## 2020-10-19 ENCOUNTER — HOSPITAL ENCOUNTER (OUTPATIENT)
Dept: RADIATION ONCOLOGY | Age: 60
Discharge: HOME | End: 2020-10-19
Attending: RADIOLOGY
Payer: COMMERCIAL

## 2020-10-19 PROCEDURE — 77387 GUIDANCE FOR RADJ TX DLVR: CPT | Performed by: RADIOLOGY

## 2020-10-19 PROCEDURE — 77412 RADIATION TX DELIVERY LVL 3: CPT | Performed by: RADIOLOGY

## 2020-10-19 PROCEDURE — G6002 STEREOSCOPIC X-RAY GUIDANCE: HCPCS | Performed by: RADIOLOGY

## 2020-10-19 PROCEDURE — 77336 RADIATION PHYSICS CONSULT: CPT | Performed by: RADIOLOGY

## 2020-10-20 ENCOUNTER — HOSPITAL ENCOUNTER (OUTPATIENT)
Dept: RADIATION ONCOLOGY | Age: 60
Discharge: HOME | End: 2020-10-20
Attending: RADIOLOGY
Payer: COMMERCIAL

## 2020-10-20 PROCEDURE — 77412 RADIATION TX DELIVERY LVL 3: CPT | Performed by: RADIOLOGY

## 2020-10-20 PROCEDURE — 77387 GUIDANCE FOR RADJ TX DLVR: CPT | Performed by: RADIOLOGY

## 2020-10-20 PROCEDURE — G6002 STEREOSCOPIC X-RAY GUIDANCE: HCPCS | Performed by: RADIOLOGY

## 2020-10-21 ENCOUNTER — HOSPITAL ENCOUNTER (OUTPATIENT)
Dept: RADIATION ONCOLOGY | Age: 60
Discharge: HOME | End: 2020-10-21
Attending: RADIOLOGY
Payer: COMMERCIAL

## 2020-10-21 PROCEDURE — 77387 GUIDANCE FOR RADJ TX DLVR: CPT | Performed by: RADIOLOGY

## 2020-10-21 PROCEDURE — G6002 STEREOSCOPIC X-RAY GUIDANCE: HCPCS | Performed by: RADIOLOGY

## 2020-10-21 PROCEDURE — 77412 RADIATION TX DELIVERY LVL 3: CPT | Performed by: RADIOLOGY

## 2020-10-22 ENCOUNTER — HOSPITAL ENCOUNTER (OUTPATIENT)
Dept: CT IMAGING | Age: 60
Discharge: HOME | End: 2020-10-22
Payer: COMMERCIAL

## 2020-10-22 ENCOUNTER — HOSPITAL ENCOUNTER (OUTPATIENT)
Dept: RADIATION ONCOLOGY | Age: 60
Discharge: HOME | End: 2020-10-22
Attending: RADIOLOGY
Payer: COMMERCIAL

## 2020-10-22 ENCOUNTER — APPOINTMENT (OUTPATIENT)
Dept: RADIATION ONCOLOGY | Age: 60
End: 2020-10-22
Attending: RADIOLOGY
Payer: COMMERCIAL

## 2020-10-22 PROCEDURE — 77290 THER RAD SIMULAJ FIELD CPLX: CPT | Performed by: RADIOLOGY

## 2020-10-22 PROCEDURE — 77412 RADIATION TX DELIVERY LVL 3: CPT | Performed by: RADIOLOGY

## 2020-10-22 PROCEDURE — 77334 RADIATION TREATMENT AID(S): CPT | Performed by: RADIOLOGY

## 2020-10-22 PROCEDURE — 99999 PR OFFICE/OUTPT VISIT,PROCEDURE ONLY: CPT | Performed by: RADIOLOGY

## 2020-10-22 PROCEDURE — 3209999900 CT GUIDE RADIATION THERAPY NO CHARGE

## 2020-10-22 PROCEDURE — 77387 GUIDANCE FOR RADJ TX DLVR: CPT | Performed by: RADIOLOGY

## 2020-10-23 ENCOUNTER — HOSPITAL ENCOUNTER (OUTPATIENT)
Dept: RADIATION ONCOLOGY | Age: 60
Discharge: HOME | End: 2020-10-23
Attending: RADIOLOGY
Payer: COMMERCIAL

## 2020-10-23 PROCEDURE — 77387 GUIDANCE FOR RADJ TX DLVR: CPT | Performed by: RADIOLOGY

## 2020-10-23 PROCEDURE — 77412 RADIATION TX DELIVERY LVL 3: CPT | Performed by: RADIOLOGY

## 2020-10-23 PROCEDURE — 77427 RADIATION TX MANAGEMENT X5: CPT | Performed by: RADIOLOGY

## 2020-10-23 PROCEDURE — G6002 STEREOSCOPIC X-RAY GUIDANCE: HCPCS | Performed by: RADIOLOGY

## 2020-10-26 ENCOUNTER — HOSPITAL ENCOUNTER (OUTPATIENT)
Dept: RADIATION ONCOLOGY | Age: 60
Discharge: HOME | End: 2020-10-26
Attending: RADIOLOGY
Payer: COMMERCIAL

## 2020-10-26 ENCOUNTER — HOSPITAL ENCOUNTER (OUTPATIENT)
Dept: PHYSICAL THERAPY | Age: 60
Setting detail: THERAPIES SERIES
Discharge: HOME | End: 2020-10-26
Payer: COMMERCIAL

## 2020-10-26 PROCEDURE — G6002 STEREOSCOPIC X-RAY GUIDANCE: HCPCS | Performed by: RADIOLOGY

## 2020-10-26 PROCEDURE — 97140 MANUAL THERAPY 1/> REGIONS: CPT

## 2020-10-26 PROCEDURE — 77412 RADIATION TX DELIVERY LVL 3: CPT | Performed by: RADIOLOGY

## 2020-10-26 PROCEDURE — 77387 GUIDANCE FOR RADJ TX DLVR: CPT | Performed by: RADIOLOGY

## 2020-10-26 PROCEDURE — 77336 RADIATION PHYSICS CONSULT: CPT | Performed by: RADIOLOGY

## 2020-10-26 NOTE — PROGRESS NOTES
[address] [facility]  ONCOLOGY REHABILITATION  PHYSICAL THERAPY  [x] DAILY NOTE [] PROGRESS NOTE [] DISCHARGE NOTE    [x] [facility]     Date: 10/26/2020  Patient Name:  Guilherme Carranza  : 1960  MRN: [id number]       Referring Practitioner Shea Parker MD with Dr. Iam Kumari   Diagnosis Malignant neoplasm of upper-outer quadrant of right female breast [C50.411]    Treatment Diagnosis R shoulder tightness, Z71.9   Date of Evaluation 20    Additional Pertinent History 20 R IDC ER/ND+, HER2-, 20 R Lumpectomy with SLNB with 0/1 node +, 20 re-excision; thyroid disease       Functional Outcome Measure Used O: QuickDASH   Functional Outcome Score    11.4% eval (20)        Insurance: Primary: Payor: Puneet Hernandez /  /  / ,   Secondary:    Authorization Information: Modalities ok, no aquatics or massage   Visit # 5, 10 for progress note   Visits Allowed: Unlimited   Recertification Date:    Physician Follow-Up: Radiation thru 20 at [address] [place] [address]: Pt is starting with her 3rd week of radiation and notes last week, started having severe fatigue by the afternoon. Notes increasing tightness at R axilla which is limiting her overhead shoulder flexion. Also complains of L levator muscle tightness which prohibits her from lifting her head from her pillow and with tipping head to the L shoulder.       TREATMENT   Precautions:    Pain: 0/10    X in shaded column indicates activity completed today   Modalities Parameters/  Location  Notes         Manual Therapy Time/Technique  Notes   PORi protocol to R upper quadrant for MLD and MFR 40 minutes x LymphaTouch 115 mmHg 60-20 Hz at R axilla to incision   Exercise/Intervention   Notes   Retro shoulder rolls 1x10      Scapular retractions 1x10      Wall walks 1x10      Pec stretches in doorway 3x15 seconds   Hands cheek height and with elbows extended   Bye-Bye's  3   Flexion and abduction Specific Interventions for Next Treatment:  Lymphedema education, PORi protocol for gentle manual lymphatic drainage and myofascial release, gentle stretches, strengthening as needed    Activity Tolerance: Patient tolerated treatment well. ASSESSMENT:  Assessment: By end of session, pt was able to flex shoulder to end range with significantly reduced pulling and greater ease. GOALS:  Patient Goal: Get back to normal quickly, avoid lymphedema     Short Term Goals to be met in 12 weeks:  1. See LTGs      Long Term Goals to be met in 12 weeks:   1. Pt to demo R shoulder PROM flexion improved from 166 deg to to 174 deg for improved ease with reaching overhead. 2. Pt to demo R shoulder strength returned to 4+/5 after surgery for return to yoga. 3. Pt to demo R shoulder PROM abduction returned to 180 deg after surgery for return to yoga. 4. Pt to demo QuickDASH score improved from 11.4% to 9% for improved ease with daily tasks. 5. Pt to demo R UE lymphedema measures controlled at <144 cm with independent lymphedema risk reduction strategies for safety after discharge.     Patient Education: Continue with stretches, try returning to yoga with modifications and pacing herself  Education Outcome: Verbalized understanding  Education Barriers: None    PLAN: Continue established plan of care with next appointment after radiation has started      [facility] Minutes: 40 min   Total Treatment Time: 40 min       Electronically Signed by: [id number] Armaan Jennings PT, TYLERT, SHANELLE [id number] 10/26/2020

## 2020-10-27 ENCOUNTER — HOSPITAL ENCOUNTER (OUTPATIENT)
Dept: RADIATION ONCOLOGY | Age: 60
Discharge: HOME | End: 2020-10-27
Attending: RADIOLOGY
Payer: COMMERCIAL

## 2020-10-27 PROCEDURE — G6002 STEREOSCOPIC X-RAY GUIDANCE: HCPCS | Performed by: RADIOLOGY

## 2020-10-27 PROCEDURE — 77387 GUIDANCE FOR RADJ TX DLVR: CPT | Performed by: RADIOLOGY

## 2020-10-27 PROCEDURE — 77412 RADIATION TX DELIVERY LVL 3: CPT | Performed by: RADIOLOGY

## 2020-10-28 ENCOUNTER — HOSPITAL ENCOUNTER (OUTPATIENT)
Dept: RADIATION ONCOLOGY | Age: 60
Discharge: HOME | End: 2020-10-28
Attending: RADIOLOGY
Payer: COMMERCIAL

## 2020-10-28 PROCEDURE — 77387 GUIDANCE FOR RADJ TX DLVR: CPT | Performed by: RADIOLOGY

## 2020-10-28 PROCEDURE — 77334 RADIATION TREATMENT AID(S): CPT | Performed by: RADIOLOGY

## 2020-10-28 PROCEDURE — 77412 RADIATION TX DELIVERY LVL 3: CPT | Performed by: RADIOLOGY

## 2020-10-28 PROCEDURE — G6002 STEREOSCOPIC X-RAY GUIDANCE: HCPCS | Performed by: RADIOLOGY

## 2020-10-29 ENCOUNTER — HOSPITAL ENCOUNTER (OUTPATIENT)
Dept: RADIATION ONCOLOGY | Age: 60
Discharge: HOME | End: 2020-10-29
Attending: RADIOLOGY
Payer: COMMERCIAL

## 2020-10-29 ENCOUNTER — HOSPITAL ENCOUNTER (OUTPATIENT)
Dept: PHYSICAL THERAPY | Age: 60
Setting detail: THERAPIES SERIES
Discharge: HOME | End: 2020-10-29

## 2020-10-29 PROCEDURE — 77387 GUIDANCE FOR RADJ TX DLVR: CPT | Performed by: RADIOLOGY

## 2020-10-29 PROCEDURE — 77412 RADIATION TX DELIVERY LVL 3: CPT | Performed by: RADIOLOGY

## 2020-10-29 PROCEDURE — 9990000039 HC MT MASSAGE PER 15MIN

## 2020-10-29 PROCEDURE — G6002 STEREOSCOPIC X-RAY GUIDANCE: HCPCS | Performed by: RADIOLOGY

## 2020-10-30 ENCOUNTER — HOSPITAL ENCOUNTER (OUTPATIENT)
Dept: RADIATION ONCOLOGY | Age: 60
Discharge: HOME | End: 2020-10-30
Attending: RADIOLOGY
Payer: COMMERCIAL

## 2020-10-30 PROCEDURE — 77387 GUIDANCE FOR RADJ TX DLVR: CPT | Performed by: RADIOLOGY

## 2020-10-30 PROCEDURE — 77321 SPECIAL TELETX PORT PLAN: CPT | Performed by: RADIOLOGY

## 2020-10-30 PROCEDURE — 77427 RADIATION TX MANAGEMENT X5: CPT | Performed by: RADIOLOGY

## 2020-10-30 PROCEDURE — 77334 RADIATION TREATMENT AID(S): CPT | Performed by: RADIOLOGY

## 2020-10-30 PROCEDURE — 77412 RADIATION TX DELIVERY LVL 3: CPT | Performed by: RADIOLOGY

## 2020-10-30 PROCEDURE — G6002 STEREOSCOPIC X-RAY GUIDANCE: HCPCS | Performed by: RADIOLOGY

## 2020-10-30 NOTE — PROGRESS NOTES
[address] THERAPY      10/29/2020    Patient presents today for a one hour full body massage. Patient  appears apprehensive and rather anxious. Her chief complaints are moderate fatigue and mild discomfort along her right scapula and left side of her neck. Mild to moderate compression was applied throughout the massage and patient tolerated well. Patient seemed to enjoy the massage and voiced returning for a second appointment. Patient was given the number to call when she is ready to schedule her next massage session.     Deedee Carson,LMT  [phone]

## 2020-11-02 ENCOUNTER — HOSPITAL ENCOUNTER (OUTPATIENT)
Dept: RADIATION ONCOLOGY | Age: 60
Discharge: HOME | End: 2020-11-02
Attending: RADIOLOGY
Payer: COMMERCIAL

## 2020-11-02 PROCEDURE — G6002 STEREOSCOPIC X-RAY GUIDANCE: HCPCS | Performed by: RADIOLOGY

## 2020-11-02 PROCEDURE — 77387 GUIDANCE FOR RADJ TX DLVR: CPT | Performed by: RADIOLOGY

## 2020-11-02 PROCEDURE — 77412 RADIATION TX DELIVERY LVL 3: CPT | Performed by: RADIOLOGY

## 2020-11-02 PROCEDURE — 77336 RADIATION PHYSICS CONSULT: CPT | Performed by: RADIOLOGY

## 2020-11-03 ENCOUNTER — HOSPITAL ENCOUNTER (OUTPATIENT)
Dept: RADIATION ONCOLOGY | Age: 60
Discharge: HOME | End: 2020-11-03
Attending: RADIOLOGY
Payer: COMMERCIAL

## 2020-11-03 PROCEDURE — 77412 RADIATION TX DELIVERY LVL 3: CPT | Performed by: RADIOLOGY

## 2020-11-03 PROCEDURE — 77280 THER RAD SIMULAJ FIELD SMPL: CPT | Performed by: RADIOLOGY

## 2020-11-04 ENCOUNTER — HOSPITAL ENCOUNTER (OUTPATIENT)
Dept: RADIATION ONCOLOGY | Age: 60
Discharge: HOME | End: 2020-11-04
Attending: RADIOLOGY
Payer: COMMERCIAL

## 2020-11-04 PROCEDURE — 77412 RADIATION TX DELIVERY LVL 3: CPT | Performed by: RADIOLOGY

## 2020-11-05 ENCOUNTER — HOSPITAL ENCOUNTER (OUTPATIENT)
Dept: RADIATION ONCOLOGY | Age: 60
Discharge: HOME | End: 2020-11-05
Attending: RADIOLOGY
Payer: COMMERCIAL

## 2020-11-05 PROCEDURE — 77412 RADIATION TX DELIVERY LVL 3: CPT | Performed by: RADIOLOGY

## 2020-11-06 ENCOUNTER — HOSPITAL ENCOUNTER (OUTPATIENT)
Dept: PHYSICAL THERAPY | Age: 60
Setting detail: THERAPIES SERIES
Discharge: HOME | End: 2020-11-06
Payer: COMMERCIAL

## 2020-11-06 ENCOUNTER — HOSPITAL ENCOUNTER (OUTPATIENT)
Dept: RADIATION ONCOLOGY | Age: 60
Discharge: HOME | End: 2020-11-06
Attending: RADIOLOGY
Payer: COMMERCIAL

## 2020-11-06 PROCEDURE — 97140 MANUAL THERAPY 1/> REGIONS: CPT

## 2020-11-06 PROCEDURE — 77427 RADIATION TX MANAGEMENT X5: CPT | Performed by: RADIOLOGY

## 2020-11-06 PROCEDURE — 77412 RADIATION TX DELIVERY LVL 3: CPT | Performed by: RADIOLOGY

## 2020-11-06 NOTE — PROGRESS NOTES
** PLEASE SIGN, DATE AND TIME CERTIFICATION BELOW AND RETURN TO [facility] OUTPATIENT REHABILITATION (FAX #: [phone]). ATTEST/CO-SIGN IF ACCESSING VIA [employer]. THANK YOU.**    I certify that I have examined the patient below and determined that Physical Medicine and Rehabilitation service is necessary and that I approve the established plan of care for up to 90 days or as specifically noted. Attestation, signature or co-signature of physician indicates approval of certification requirements.    ________________________ ____________ __________  Physician Signature   Date   Time    [facility]  PHYSICAL THERAPY  [] DAILY NOTE [x] PROGRESS NOTE [] DISCHARGE NOTE    [x]  [facility]     Date: 2020  Patient Name:  Mary Ivey  : 1960  MRN: [id number]       Referring Practitioner Asael Arias MD with Dr. Roseann King   Diagnosis Malignant neoplasm of upper-outer quadrant of right female breast [C50.411]    Treatment Diagnosis R shoulder tightness, Z71.9   Date of Evaluation 20    Additional Pertinent History 20 R IDC ER/OR+, HER2-, 20 R Lumpectomy with SLNB with 0/1 node +, 20 re-excision; thyroid disease       Functional Outcome Measure Used O: QuickDASH   Functional Outcome Score    11.4% eval (20)        Insurance: Primary: Payor: [facility] /  /  / ,   Secondary:    Authorization Information: Modalities ok, no aquatics or massage   Visit # 6, 6/10 for progress note   Visits Allowed: Unlimited   Recertification Date: 3/51/88   Physician Follow-Up: Radiation thru 20 at [address]: Pt's last day of radiation is today. Admits her skin is more irritated and tender at her boost location. Reports has needle stick sensation in axilla but no longer occurs anywhere further distal in the arm.  Reports has returned to her standing balance and stretching routine and notes no discomfort or issue.      TREATMENT   Precautions:    Pain: 3/10 lumpectomy site    X in shaded column indicates activity completed today   Modalities Parameters/  Location  Notes         Manual Therapy Time/Technique  Notes   PORi protocol to R upper quadrant for MLD and MFR 45 minutes x LymphaTouch 115 mmHg 60 Hz at R axilla to incision   Exercise/Intervention   Notes   Retro shoulder rolls 1x10      Scapular retractions 1x10      Wall walks 1x10      Pec stretches in doorway 3x15 seconds   Hands cheek height and with elbows extended   Bye-Bye's  3   Flexion and abduction                                                     Upper Extremity Circumferential Measurements     Right (cm) Left (cm) Comments   Met Heads 18.3 18.3     Ulnar Styloid Process 14.9 14.5     10 cm distal to Lateral Epicondyle 23.3 22.8     Elbow Crease 23.6 23     10 cm proximal from Lateral Epicondyle 30.4 28     Axilla 31.5 32.5     Total 142 139. 1                                          9/10/20:    140          138. 6        8/17/20:                      142          140.4                                     8/7/20:                         140. 2       138.5    Specific Interventions for Next Treatment:  Lymphedema education, PORi protocol for gentle manual lymphatic drainage and myofascial release, gentle stretches, strengthening as needed    Activity Tolerance: Patient tolerated treatment well. ASSESSMENT:  Assessment: Pt with improved shoulder function however decline in shoulder mobility with radiation therapy as well as increase in circumferential measures. Pt with cording-like band at superior axilla and pt complains of needle like stab here with shoulder flexion overhead. Recommend continued skilled PT to address these issues. GOALS:  Patient Goal: Get back to normal quickly, avoid lymphedema     Short Term Goals to be met in 12 weeks:  1. See LTGs      Long Term Goals to be met in 12 weeks:   1.  Pt to demo R shoulder PROM flexion improved

## 2020-11-13 ENCOUNTER — HOSPITAL ENCOUNTER (OUTPATIENT)
Dept: INFUSION THERAPY | Age: 60
Discharge: HOME | End: 2020-11-13
Payer: COMMERCIAL

## 2020-11-13 ENCOUNTER — OFFICE VISIT (OUTPATIENT)
Dept: ONCOLOGY | Age: 60
End: 2020-11-13
Payer: COMMERCIAL

## 2020-11-13 VITALS
DIASTOLIC BLOOD PRESSURE: 60 MMHG | HEIGHT: 60 IN | HEART RATE: 82 BPM | OXYGEN SATURATION: 99 % | RESPIRATION RATE: 18 BRPM | SYSTOLIC BLOOD PRESSURE: 129 MMHG | TEMPERATURE: 98.7 F | BODY MASS INDEX: 26.5 KG/M2 | WEIGHT: 135 LBS

## 2020-11-13 PROCEDURE — 99211 OFF/OP EST MAY X REQ PHY/QHP: CPT

## 2020-11-13 PROCEDURE — 99214 OFFICE O/P EST MOD 30 MIN: CPT | Performed by: INTERNAL MEDICINE

## 2020-11-13 RX ORDER — ANASTROZOLE 1 MG/1
1 TABLET ORAL DAILY
Qty: 30 TABLET | Refills: 3 | Status: SHIPPED | OUTPATIENT
Start: 2020-11-13 | End: 2021-03-02 | Stop reason: SDUPTHER

## 2020-11-13 ASSESSMENT — ENCOUNTER SYMPTOMS
VOMITING: 0
RECTAL PAIN: 0
ABDOMINAL PAIN: 0
ABDOMINAL DISTENTION: 0
EYE DISCHARGE: 0
BACK PAIN: 0
WHEEZING: 0
FACIAL SWELLING: 0
CONSTIPATION: 0
DIARRHEA: 0
TROUBLE SWALLOWING: 0
CHEST TIGHTNESS: 0
SORE THROAT: 0
NAUSEA: 0
BLOOD IN STOOL: 0
COLOR CHANGE: 0
SHORTNESS OF BREATH: 0
COUGH: 0

## 2020-11-13 NOTE — PROGRESS NOTES
[facility], [address] [facility],[address]  [address]  Dept: [phone]  Dept Fax: [phone]: [phone]    Visit Date:11/13/2020     Neal Recinos is a 56 y.o. female who presents today for:   Chief Complaint   Patient presents with    Follow-up     Breast cancer, stage 1, estrogen receptor positive, right         HPI:   Mrs. Chel Garcia is a 47-year-old postmenopausal patients with newly diagnosed stage Ia right breast cancer. She presents to the medical oncology clinic to discuss postsurgical treatment. He had an annual screening mammogram on July 31, 2020 that showed 0.9 cm round high density spiculated mass in the right breast.  She underwent core biopsy on August 4, 2020 that showed low grade invasive ductal cancer of the right breast.  Tumor cells were estrogen receptor  Positive, progesterone receptor positive. HER-2/deysi receptors stain 2+ by IHC. FISH was negative for HER-2 amplification. Prior to the biopsy she complained of breast pain in the right breast and denied galactorrhea. Subsequently she met with the surgeon Dr. Valeria Sanchez and after discussing her surgical treatment options she decided to proceed with lumpectomy and sentinel lymph node mapping and biopsy. Pathology report from the surgery on August 18, 2020 showed:  A. Right breast, lumpectomy:    Invasive ductal adenocarcinoma, Ousmane grade 1.    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    Skin (anterior) margin focally positive for invasive carcinoma.    DCIS 1mm from skin (anterior) margin.    Pathologic Stage: pT1b, pN0(sn). B. Churdan lymph node, right, excision:    Negative for malignancy (0/1). Risk assessment: none. She has not been on previous estrogen therapy. Felt something in right axillary area. Due to positive inferior margin on August 27, 2020 she underwent reexcision. Pathology report showed:  Breast, right, additional margin:    Biopsy site changes.  Microcalcifications within benign breast tissue.    No evidence of residual malignancy. Presents the medical oncology clinic to discuss postsurgical treatment. Ms. Madison Lerma maternal grandmother was diagnosed with pancreatic cancer in her [de-identified]. A maternal uncle was also diagnosed with pancreatic cancer. Otherwise, there are no known cancers in her maternal extended family. Ms. Heath Stewart elected to decline genetic testing today. She would like additional time to consider her options and discuss the testing with her family members before making a final decision. Interim history on 11/13/2020:  Patient presents to the medical oncology clinic to start adjuvant hormonal treatment. The patient completed her radiation treatment. She received 5256 cGy in 20 fractions.   Overall she tolerated radiation treatment well    HPI   Past Medical History:   Diagnosis Date    Acquired autoimmune hypothyroidism     Breast cancer ([address] 08/2020    Diverticulitis     Dr. Kristine Wiseman History of kidney stones     Intestinal metaplasia of gastric mucosa         Osteopenia 2006      Past Surgical History:   Procedure Laterality Date    BREAST LUMPECTOMY Right 8/18/2020    RIGHT BREAST LUMPECTOMY AND SENTINEL LYMPH NODE BIOPSY performed by Florinda Nova MD at [address] [place] [address] Right 8/27/2020    RE-EXCISION ANTERIOR MARGIN RIGHT BREAST performed by Florinda Nova MD at [address] [place] [address],[address]  [id number]    x2    COLONOSCOPY  08/2018    Dr. Michelle Peng Left 06/02/2017    With left ureteroscopy, basket retrieval of stone fragments, left ureterenoscopy, basket retrieval of renal stones, and left ureter stent placement--Dr Niru Ny LAPAROSCOPY  [id number]    pelvic    TAMIKO US GUID NDL BIOPSY RIGHT  8/4/2020    TAMIKO [address] RIGHT 8/4/2020 [facility]    UPPER GASTROINTESTINAL ENDOSCOPY  2010    US GUIDED NEEDLE LOC OF RIGHT BREAST  8/18/2020    US GUIDED NEEDLE LOC OF RIGHT BREAST 2020 [facility]      Family History   Problem Relation Age of Onset    Other Mother         fuches corneal dystrophy, scleroderma, osteoporosis    High Blood Pressure Mother         pulmonary htn    High Blood Pressure Father     Cancer Father         lung    Diabetes Maternal Grandmother     Cancer Maternal Grandmother         pancreatic    Other Maternal Grandfather         fuches corneal dystrophy    Other Sister         fuches corneal dystrophy    Other Sister         fuches corneal dystrophy    Heart Disease Sister         pacemaker    Other Sister         fuches corneal dystrophy, hypothyroidism    Other Sister         hypothyroidism, reynauds      Social History     Tobacco Use    Smoking status: Former Smoker     Packs/day: 1.00     Years: 20.00     Pack years: 20.00     Types: Cigarettes     Last attempt to quit: 1997     Years since quittin.9    Smokeless tobacco: Never Used   Substance Use Topics    Alcohol use: No      Current Outpatient Medications   Medication Sig Dispense Refill    anastrozole (ARIMIDEX) 1 MG tablet Take 1 tablet by mouth daily 30 tablet 3    thyroid (ARMOUR THYROID) 15 MG tablet Take 15 mg by mouth daily Alternate every other day with 30 mg       No current facility-administered medications for this visit.        Allergies   Allergen Reactions    Acetaminophen      Gi upset    Aspartame And Phenylalanine     Bextra [Valdecoxib]      urinary retention    Doxycycline      GI    Magnesium Citrate      Internal  Pain, nausea, vomiting, diarrhea    Ciprofloxacin Palpitations    Flagyl [Metronidazole] Palpitations    Neomycin Rash      Health Maintenance   Topic Date Due    Hepatitis C screen  1960    HIV screen  1973    DTaP/Tdap/Td vaccine (1 - Tdap) 1977    Cervical cancer screen  1979    Lipid screen  1998    Diabetes screen  1998    Shingles Vaccine (1 of 2) 2008    Colon cancer screen colonoscopy  05/28/2008    Flu vaccine (1) 09/01/2020    Breast cancer screen  08/18/2021    Hepatitis A vaccine  Aged Out    Hepatitis B vaccine  Aged Out    Hib vaccine  Aged Out    Meningococcal (ACWY) vaccine  Aged Out    Pneumococcal 0-64 years Vaccine  Aged Out        Subjective:   Review of Systems   Constitutional: Negative for activity change, appetite change, fatigue and fever. HENT: Negative for congestion, dental problem, facial swelling, hearing loss, mouth sores, nosebleeds, sore throat, tinnitus and trouble swallowing. Eyes: Negative for discharge and visual disturbance. Respiratory: Negative for cough, chest tightness, shortness of breath and wheezing. Cardiovascular: Negative for chest pain, palpitations and leg swelling. Gastrointestinal: Negative for abdominal distention, abdominal pain, blood in stool, constipation, diarrhea, nausea, rectal pain and vomiting. Endocrine: Negative for cold intolerance, polydipsia and polyuria. Genitourinary: Negative for decreased urine volume, difficulty urinating, dysuria, flank pain, hematuria and urgency. Musculoskeletal: Negative for arthralgias, back pain, gait problem, joint swelling, myalgias and neck stiffness. Skin: Negative for color change, rash and wound. Neurological: Negative for dizziness, tremors, seizures, speech difficulty, weakness, light-headedness, numbness and headaches. Hematological: Negative for adenopathy. Does not bruise/bleed easily. Psychiatric/Behavioral: Negative for confusion and sleep disturbance. The patient is not nervous/anxious. Objective:   Physical Exam  Vitals signs reviewed. Constitutional:       General: She is not in acute distress. Appearance: She is well-developed. HENT:      Head: Normocephalic. Mouth/Throat:      Pharynx: No oropharyngeal exudate. Eyes:      General: No scleral icterus. Right eye: No discharge. Left eye: No discharge. Pupils: Pupils are equal, round, and reactive to light. Neck:      Musculoskeletal: Normal range of motion and neck supple. Thyroid: No thyromegaly. Vascular: No JVD. Trachea: No tracheal deviation. Cardiovascular:      Rate and Rhythm: Normal rate. Heart sounds: Normal heart sounds. No murmur. No friction rub. No gallop. Pulmonary:      Effort: Pulmonary effort is normal. No respiratory distress. Breath sounds: Normal breath sounds. No stridor. No wheezing or rales. Chest:      Chest wall: No tenderness. Breasts:         Right: Skin change (Lumpectomy incision is nicely healed) present. Abdominal:      General: Bowel sounds are normal. There is no distension. Palpations: Abdomen is soft. There is no mass. Tenderness: There is no abdominal tenderness. There is no rebound. Musculoskeletal: Normal range of motion. Comments: Good range of motion in all four extremities. Lymphadenopathy:      Cervical: No cervical adenopathy. Skin:     General: Skin is warm. Findings: No erythema or rash. Neurological:      Mental Status: She is alert and oriented to person, place, and time. Cranial Nerves: No cranial nerve deficit. Motor: No abnormal muscle tone. Deep Tendon Reflexes: Reflexes are normal and symmetric. Psychiatric:         Behavior: Behavior normal.         Thought Content:  Thought content normal.         Judgment: Judgment normal.         /60 (Site: Left Upper Arm, Position: Sitting, Cuff Size: Medium Adult)   Pulse 82   Temp 98.7 °F (37.1 °C) (Oral)   Resp 18   Ht 5' (1.524 m)   Wt 135 lb (61.2 kg)   SpO2 99%   BMI 26.37 kg/m²      ECOG status is 0    Imaging studies and labs:     Lab Results   Component Value Date    WBC 8.3 07/27/2018    HGB 14.3 08/12/2020    HCT 43.9 08/12/2020    MCV 94.1 07/27/2018     07/27/2018       Chemistry        Component Value Date/Time     07/27/2018 1629    K 4.3 07/27/2018 1629    CL 98 07/27/2018 1629    CO2 25 07/27/2018 1629    BUN 15 07/27/2018 1629    CREATININE 0.6 07/27/2018 1629        Component Value Date/Time    CALCIUM 10.0 07/27/2018 1629    BILITOT NEGATIVE 06/01/2017 1006            Assessment/Plan:   1. Stage IA (cT1b, cN0, cM0, G1, ER+, NJ+, HER2-) right breast cancer. The patient is diagnosed with stage I A hormone responsive right breast cancer. She is status post right lumpectomy with axillary LN biopsy. She presents to the [address] [place] [address] to discuss further management. The patient was informed that she has an early stage of breast cancer. Since her tumor was larger than 5 mm in size and she is younger than 79, my recommendation was to proceed with breast Oncotype DX assay. The Oncotype Dx 21-gene recurrence score (RS) is the best-validated prognostic and predictive assay to identify patients who are most and least likely to derive benefit from adjuvant chemotherapy. RS 25 or below identifies women with low risk of disease recurrence for whom chemotherapy is unlikely to provide a benefit. Her recurrence score came back as 16, placing her at low risk group. Her risk of distant disease recurrence during the next 9 years is 4% with adjuvant hormonal therapy. Patient completed her radiation treatment to the reminder of her right breast 2 weeks ago. She received total cumulative dose of 5256 cGy in 20 fractions. 2.  Adjuvant hormonal therapy. Side effects of arimidex explained: including but not limited to hot flashes, aches, osteoporosis, edema, vasodilation, rash, GI upset, mood changes, sob/cough, dyslipidemia, thrombophlebitis, anemia, leukopenia, thromboembolic disorder (rare, more so with tamoxifen), hypersensitivity, vaginal bleeding (rare, more so with tamoxifen), pain - pt understands and agrees to proceed. No follow-ups on file.      Orders Placed:   Orders Placed This Encounter   Procedures    DEXA BONE DENSITY AXIAL SKELETON Standing Status:   Future     Number of Occurrences:   1     Standing Expiration Date:   11/13/2021        Medications Prescribed:   Orders Placed This Encounter   Medications    anastrozole (ARIMIDEX) 1 MG tablet     Sig: Take 1 tablet by mouth daily     Dispense:  30 tablet     Refill:  3            Discussed use, benefit, and side effectsof prescribed medications. All patient questions answered. Pt voiced understanding. Instructed to continue current medications, diet and exercise. Patient agreed with treatment plan. Follow up as directed.     Electronically signed by Joaquin Kwon MD on 9/2/20 at 2:06 PM EDT

## 2020-11-17 ENCOUNTER — HOSPITAL ENCOUNTER (OUTPATIENT)
Dept: WOMENS IMAGING | Age: 60
Discharge: HOME | End: 2020-11-17
Payer: COMMERCIAL

## 2020-11-17 PROCEDURE — 77080 DXA BONE DENSITY AXIAL: CPT

## 2020-11-23 ENCOUNTER — HOSPITAL ENCOUNTER (OUTPATIENT)
Dept: PHYSICAL THERAPY | Age: 60
Setting detail: THERAPIES SERIES
Discharge: HOME | End: 2020-11-23
Payer: COMMERCIAL

## 2020-11-23 PROCEDURE — 97535 SELF CARE MNGMENT TRAINING: CPT

## 2020-11-23 PROCEDURE — 97110 THERAPEUTIC EXERCISES: CPT

## 2020-12-15 ENCOUNTER — HOSPITAL ENCOUNTER (OUTPATIENT)
Dept: RADIATION ONCOLOGY | Age: 60
Discharge: HOME | End: 2020-12-15
Attending: RADIOLOGY
Payer: COMMERCIAL

## 2020-12-15 VITALS
HEART RATE: 68 BPM | SYSTOLIC BLOOD PRESSURE: 126 MMHG | RESPIRATION RATE: 16 BRPM | DIASTOLIC BLOOD PRESSURE: 58 MMHG | BODY MASS INDEX: 26.66 KG/M2 | WEIGHT: 136.5 LBS | TEMPERATURE: 97.2 F | OXYGEN SATURATION: 98 %

## 2020-12-15 PROCEDURE — 99214 OFFICE O/P EST MOD 30 MIN: CPT | Performed by: NURSE PRACTITIONER

## 2020-12-15 PROCEDURE — 99212 OFFICE O/P EST SF 10 MIN: CPT | Performed by: NURSE PRACTITIONER

## 2020-12-15 NOTE — PROGRESS NOTES
[facility], [address]  Phone: [phone]   Toll Free: [phone]   Fax: [phone]    RADIATION ONCOLOGY FOLLOW UP REPORT    PATIENT NAME:  Romeo Montiel     : 1960  MEDICAL RECORD NO: [id number]    LOCATION: [facility] NO: [id number]      PROVIDER: WILIAN Morel CNP        DATE OF SERVICE: 12/15/2020    FOLLOW UP PHYSICIANS: Juju Cleveland; SAMANTA Cazares    DIAGNOSIS: A79.811 -- Malignant neoplasm of the axillary tail portion of the right female breast; Infiltrating ductal carcinoma, Grade 1 (with extenbsive intraductal component, DCIS intermediate grade); pT1b pN0(sn) M0, Stage IA; ER+; VA+; Her2-; Oncotype Dx Score 16. DATE OF DIAGNOSIS: 2020    END OF TREATMENT DATE: 2020    ECOG PERFORMANCE STATUS: 0    PAIN: Denies    CHAPERONE: Declined      HPI: Yoana Miramontes is a 78-year-old woman who developed pain in the right breast.  She underwent diagnostic mammogram, showing a 9 mm density in the axillary tail of the right breast.  Ultrasound showed a spiculated nodule corresponding to the mammographic abnormality, and was considered highly suggestive of malignancy. Subsequent biopsy 2020 showed invasive ductal carcinoma, ER positive, ER positive. HER-2/deysi was equivocal on the initial evaluation, but subsequent additional evaluation indicated that HER-2/deysi is non-amplified. Yoana Miramontes was seen for surgical consultation, and had a lengthy discussion about current clinical practice guidelines, treatment options and various aspects of the different management options. She elected to undergo breast conservation surgery. Final pathology showed a 9 mm invasive ductal carcinoma with DCIS involving 4 of the 7 examined blocks. Initial surgical margins were focally positive, and a reexcision was performed 2020 with no evidence of residual malignancy in the specimen.   Oncotype DX testing was performed, returning with a recurrence score of 16 (low risk). Based on the clinical stage an Oncotype score, Imani Millan has received a recommendation for adjuvant endocrine therapy for her systemic management. She was seen 9/28/2020 for evaluation and discussion regarding the role of radiation therapy in the management of her early stage right breast cancer. Imani Millan did develop moderate erythema to the right breast and a burning/warm sensation towards the end of treatment. She did have tight feeling in the axilla and was following with physical therapy for this. She did not experience any unexpected side effects during her course of treatment. INTERVAL HISTORY: Imani Millan returns to [address] [place] [address] for a post treatment follow up after undergoing surgery and radiation for breast cancer. Imani Millan denies complaints related to the radiation treatment at this time. Since completing treatment she started on Arimidex and his been on this for about 1 month now. She states she feels like she is gaining weight and has notices a few hot flashes. She does admit to tenderness to the right breast and states it feels full at times. She also sates she does get a \"pulling sensation\" when she raises her arm above her head. She denies lingering fatigue. LAB RESULTS:   No recent labs for review. RADIOLOGY RESULTS:   11/17/2020 DEXA:   IMPRESSION: OSTEOPOROSIS   Patient is at high risk for fracture.  The patient meets criteria    for a bone fragility clinic.  Patient consult w/primary care    provider is recommended. MEDICATIONS:   Current Outpatient Medications   Medication Sig Dispense Refill    anastrozole (ARIMIDEX) 1 MG tablet Take 1 tablet by mouth daily 30 tablet 3    thyroid (ARMOUR THYROID) 15 MG tablet Take 15 mg by mouth daily Alternate every other day with 30 mg       No current facility-administered medications for this encounter.           ROS: As noted in the HPI and Interval history, otherwise negative. EXAMINATION:   GENERAL: Eduardo Lim is a pleasant well developed adult female. She is alert and oriented x3 in no acute distress. VITAL SIGNS:  Weight 136.8 pounds. Temperature: 97.2 F, Pulse 68, /58, Respirations 16, Pulse ox 98% room air. HEENT: Normocephalic, atraumatic. PERRL. EOMI. Ears, nose and lips are within normal limits on external examination. Oropharynx unremarkable. NECK: Without palpable cervical adenopathy. LYMPH: Without palpable supraclavicular or axillary adenopathy. LUNGS: Clear without adventitious sounds. Respirations easy and unlabored. HEART: Regular rate and rhythm without murmur. BREASTS: Left breast is soft, nontender. There are no suspicious masses, lumps or architectural distortions. Nipple everted without discharge. Right breast is soft, tender with palpation. Post treatment swelling and residual hyperpigmentation noted. There are no suspicious masses or lumps. Surgical incision site without signs of infection. Nipple everted without discharge. ABDOMEN: Soft, nontender, nondistended. Active bowel sounds x4. EXTREMITIES: Without clubbing or cyanosis. No edema noted. Full ROM bilateral upper extremities. NEUROLOGIC EXAMINATION: Cranial nerves grossly intact. ASSESSMENT: Eduardo Lim was diagnosed with right breast cancer in August 2020. She underwent treatment with surgery and radiation. Since completing radiation she has started on Arimidex, which she will be on for the next 5 years. Since starting Arimidex she has had hot flashes and feelings of weight gain. On exam she does have lingering swelling and hyperpigmentation to the right breast. She has not been performing massage or her ROM exercises she received from PT. She was encouraged to do so as this well help the breast tenderness and swelling as well as improve the right shoulder ROM. Mammogram will be due in approximately July.  Eduardo Lim does not have concerning findings on today's exam.

## 2021-01-04 ENCOUNTER — TELEPHONE (OUTPATIENT)
Dept: SPIRITUAL SERVICES | Facility: CLINIC | Age: 61
End: 2021-01-04

## 2021-01-04 NOTE — TELEPHONE ENCOUNTER
On 1/4 a phone call, as a follow up from spiritual care was made to Shireen Viera to wish her a happy new year. She is doing well and will not need spiritual services at this time. She was grateful for the services. Care Plan:  Continue spiritual and emotional care for patient and family. Including prayers.

## 2021-02-08 NOTE — PROGRESS NOTES
[facility]Y [address] [facility], [address]  Ph. [phone]  Fax [phone]    PATIENT: Singh aGxiola  : 1960  MRN: [id number]  DATE: 2020      DIAGNOSIS: C50.611 -- Malignant neoplasm of the axillary tail portion of the right female breast; Infiltrating ductal carcinoma, Grade 1 (with extenbsive intraductal component, DCIS intermediate grade); pT1b pN0(sn) M0, Stage IA; ER+; IA+; Her2-; Oncotype Dx Score 16. Date of Diagnosis: 2020      Treatment Course Number: 1    Treatment Site (s) Modality Dose (cGy) From To Fractions/  Elapsed Days   Right Breast Mixed 6MV/15MV photons 4256 cGy 10/12/2020 2020 16/ 21   Right Breast Tumor Bed Boost 9Me-V electrons 1000 cGy 2020     Cumulative Dose to Right Breast Tumor Bed: 5256 cGy  Treatment Modifiers: Right Breast -- 3-Dimensional Conformal Therapy; Custom MLC Blocking; Field-in-Field Technique; Custom Vac-Fix Immobilization; Daily Stereoscopic Guidance with Optical Surface Monitoring Guided Set Up. Tumor Bed Boost -- Appositional Electron Particle Beam Therapy (Special Teletherapy Port Plan); Custom Cerrobend Blocking; Custom Vac-Fix Immobilization. HISTORY OF PRESENT ILLNESS:   Gutierrez Nguyen is a 58-year-old woman who developed pain in the right breast.  She underwent diagnostic mammogram, showing a 9 mm density in the axillary tail of the right breast.  Ultrasound showed a spiculated nodule corresponding to the mammographic abnormality, and was considered highly suggestive of malignancy. Subsequent biopsy 2020 showed invasive ductal carcinoma, ER positive, ER positive. HER-2/deysi was equivocal on the initial evaluation, but subsequent additional evaluation indicated that HER-2/deysi is non-amplified.   Maria C Gatica was seen for surgical consultation, and had a lengthy discussion about current clinical Registry: [facility]      This document was created using a voice-recognition program.  Computer generated transcription errors may be present.

## 2021-02-10 ENCOUNTER — OFFICE VISIT (OUTPATIENT)
Dept: ONCOLOGY | Age: 61
End: 2021-02-10
Payer: COMMERCIAL

## 2021-02-10 ENCOUNTER — HOSPITAL ENCOUNTER (OUTPATIENT)
Dept: INFUSION THERAPY | Age: 61
Discharge: HOME | End: 2021-02-10
Payer: COMMERCIAL

## 2021-02-10 VITALS
HEIGHT: 60 IN | WEIGHT: 136.4 LBS | DIASTOLIC BLOOD PRESSURE: 60 MMHG | RESPIRATION RATE: 18 BRPM | BODY MASS INDEX: 26.78 KG/M2 | SYSTOLIC BLOOD PRESSURE: 126 MMHG | OXYGEN SATURATION: 97 % | HEART RATE: 92 BPM | TEMPERATURE: 98.4 F

## 2021-02-10 DIAGNOSIS — Z98.890 STATUS POST RIGHT BREAST LUMPECTOMY: ICD-10-CM

## 2021-02-10 DIAGNOSIS — Z79.811 PROPHYLACTIC USE OF ANASTROZOLE (ARIMIDEX): ICD-10-CM

## 2021-02-10 DIAGNOSIS — C50.919 MALIGNANT NEOPLASM OF BREAST IN FEMALE, ESTROGEN RECEPTOR POSITIVE, UNSPECIFIED LATERALITY, UNSPECIFIED SITE OF BREAST (HCC): Primary | ICD-10-CM

## 2021-02-10 DIAGNOSIS — Z17.0 MALIGNANT NEOPLASM OF BREAST IN FEMALE, ESTROGEN RECEPTOR POSITIVE, UNSPECIFIED LATERALITY, UNSPECIFIED SITE OF BREAST (HCC): Primary | ICD-10-CM

## 2021-02-10 PROCEDURE — 99211 OFF/OP EST MAY X REQ PHY/QHP: CPT

## 2021-02-10 PROCEDURE — 99214 OFFICE O/P EST MOD 30 MIN: CPT | Performed by: PHYSICIAN ASSISTANT

## 2021-02-10 NOTE — PROGRESS NOTES
[facility], [facility],[address]  [address] [address]  Dept: [phone]  Dept Fax: [phone]: [phone]      Visit Date:2/10/2021     Yuki Banks is a 56 y.o. female who presents today for:   Chief Complaint   Patient presents with    Follow-up     Breast cancer, stage 1, estrogen receptor positive, right ([address]        HPI:   Yuki Banks is a 56 y.o. female followed by Dr. Bethel Diallo for breast cancer. Per Dr. Reji Balderas note on 11/13/20: newly diagnosed stage Ia right breast cancer. She presents to the medical oncology clinic to discuss postsurgical treatment. He had an annual screening mammogram on July 31, 2020 that showed 0.9 cm round high density spiculated mass in the right breast.  She underwent core biopsy on August 4, 2020 that showed low grade invasive ductal cancer of the right breast.  Tumor cells were estrogen receptor  Positive, progesterone receptor positive. HER-2/deysi receptors stain 2+ by IHC. FISH was negative for HER-2 amplification. Prior to the biopsy she complained of breast pain in the right breast and denied galactorrhea. Subsequently she met with the surgeon Dr. Molina Chacon and after discussing her surgical treatment options she decided to proceed with lumpectomy and sentinel lymph node mapping and biopsy. Pathology report from the surgery on August 18, 2020 showed:  A. Right breast, lumpectomy:    Invasive ductal adenocarcinoma, Ousmane grade 1.    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    Skin (anterior) margin focally positive for invasive carcinoma.    DCIS 1mm from skin (anterior) margin.    Pathologic Stage: pT1b, pN0(sn). B. [place] lymph node, right, excision:    Negative for malignancy (0/1). Risk assessment: none. She has not been on previous estrogen therapy. Felt something in right axillary area.      Due to positive inferior margin on August 27, 2020 she underwent reexcision.   Pathology report showed:  Breast, right, additional margin:    Biopsy site changes.    Microcalcifications within benign breast tissue.    No evidence of residual malignancy. Presents the medical oncology clinic to discuss postsurgical treatment. Ms. Lind's maternal grandmother was diagnosed with pancreatic cancer in her [de-identified]. A maternal uncle was also diagnosed with pancreatic cancer. Otherwise, there are no known cancers in her maternal extended family. Ms. Lind elected to decline genetic testing today. She would like additional time to consider her options and discuss the testing with her family members before making a final decision. Interval History 2/10/2021:   The patient is here for follow-up evaluation. At her last visit with Dr. Alcides Leonard in November 2020 she was placed on Arimidex. She reports she tolerates Arimidex well. The patient does note firmness in the right breast with painful movements at times. She reports following radiation she did complete physical therapy previously which helped improve her range of motion. She denies nipple inversion, nipple discharge, palpable masses. She denies poor appetite, early satiety, unintentional weight loss, lymphadenopathy, night sweats or recurrent fevers. PMH, SH, and FH:  I reviewed the patients medication list and allergy list as noted on the electronic medical record. The PMH, SH and FH were also reviewed as noted on the EMR. Review of Systems:   Review of Systems   Pertinent review of systems noted in HPI, all other ROS negative. Objective:   Physical Exam   /60 (Site: Left Upper Arm, Position: Sitting, Cuff Size: Medium Adult)   Pulse 92   Temp 98.4 °F (36.9 °C) (Oral)   Resp 18   Ht 5' (1.524 m)   Wt 136 lb 6.4 oz (61.9 kg)   SpO2 97%   BMI 26.64 kg/m²    General appearance: No apparent distress, well developed and cooperative. HEENT: Pupils equal, round, and reactive to light. Conjunctivae/corneas clear.    Neck: Supple, with full range of motion. Trachea midline. Respiratory:  Normal respiratory effort. Clear to auscultation, bilaterally without Rales/Wheezes/Rhonchi. Cardiovascular: Regular rate and rhythm with normal S1/S2 without murmurs, rubs or gallops. Chest: breast exam revealed firmness of the right breast with lymphedema to the lateral inferior portion. There is tenderness with palpation and tight pectoral muscle noted. No nipple inversion or discharge noted. Abdomen: Soft, non-tender, non-distended with active bowel sounds. Musculoskeletal: No clubbing, cyanosis or edema bilaterally. Skin: Skin color, texture, turgor normal.  No visible rashes or lesions. Neurologic:  Neurovascularly intact without any focal sensory/motor deficits. Psychiatric: Alert and oriented      Imaging Studies and Labs:   CBC:   Lab Results   Component Value Date    WBC 8.3 07/27/2018    HGB 14.3 08/12/2020    HCT 43.9 08/12/2020    MCV 94.1 07/27/2018     07/27/2018     BMP:   Lab Results   Component Value Date     07/27/2018    K 4.3 07/27/2018    CL 98 07/27/2018    CO2 25 07/27/2018    BUN 15 07/27/2018    CREATININE 0.6 07/27/2018    GLUCOSE 111 07/27/2018    CALCIUM 10.0 07/27/2018      LFT:   Lab Results   Component Value Date    BILITOT NEGATIVE 06/01/2017         Assessment and Plan:   1. Stage IA (cT1b, cN0, cM0, G1, ER+, GA+, HER2-) right breast cancer  S/P right lumpectomy with axillary LN biopsy. Since her tumor was larger than 5 mm in size and she is younger than 79, she had [facility] Oncotype DX assay. Her recurrence score came back as 16, placing her at low risk group. Her risk of distant disease recurrence during the next 9 years is 4% with adjuvant hormonal therapy. Patient completed her radiation treatment with total cumulative dose of 5256 cGy in 20 fractions. She was placed on Arimidex in November 2020.   2.  Adjuvant hormonal therapy. The patient was placed on Arimidex in November 2020.  She tolerates Armidex well without associated side effects. DEXA scan completed on 11/17/2020 showed osteoporosis. -Patient was instructed to take daily vitamin D 800 IU and calcium 1200 mg daily    -She was instructed to practice weight bearing exercise such as yoga, weight lifting, resistance bands  3. Right Breast Firmness/Limited ROM of Right Upper Extremity   Likely related to lymphedema of the right breast.   -Will refer to Physical therapy, Marina, DPT for lymphedema evaluation and management   4. Cancer surveillance   Management of breast cancer survivors who have completed active treatment and have no evidence of disease are cancer surveillance, lifestyle modifications including pursuing healthy regular exercise program, minimizing alcohol intake, and refraining from smoking. Survivor follow-up will include updated history, regular physical examination. Radiologic imaging to screen for distant recurrence will be not performed unless the patient will develop new symptoms. Symptoms suspicious for recurrent /metastic disease include:  ?Constitutional symptoms - Anorexia, weight loss, malaise, fatigue, insomnia. ? Bone pain  ? Pulmonary symptoms - persistent cough or dyspnea (at rest or with exertion). ?Neurologic symptoms - Headache, nausea, vomiting, confusion, weakness, numbness or tingling. ?Gastrointestinal symptoms - Right upper quadrant pain, change in bowel habits, presence of bloody or tarry stools. Return to clinic with Dr. Hiwot Burt in 3 months. All patient questions answered. Pt voiced understanding. Patient agreed with treatment plan. Follow up as directed. Patient instructed to call for questions or concerns.           Electronically signed by   Leeanne Srinivasan PA-C

## 2021-02-18 ENCOUNTER — APPOINTMENT (OUTPATIENT)
Dept: PHYSICAL THERAPY | Age: 61
End: 2021-02-18
Payer: COMMERCIAL

## 2021-02-18 ENCOUNTER — HOSPITAL ENCOUNTER (OUTPATIENT)
Dept: PHYSICAL THERAPY | Age: 61
Setting detail: THERAPIES SERIES
Discharge: HOME | End: 2021-02-18
Payer: COMMERCIAL

## 2021-02-18 PROCEDURE — 97535 SELF CARE MNGMENT TRAINING: CPT

## 2021-02-18 PROCEDURE — 97161 PT EVAL LOW COMPLEX 20 MIN: CPT

## 2021-02-18 PROCEDURE — 97140 MANUAL THERAPY 1/> REGIONS: CPT

## 2021-02-18 NOTE — PROGRESS NOTES
** PLEASE SIGN, DATE AND TIME CERTIFICATION BELOW AND RETURN TO [facility] OUTPATIENT REHABILITATION (FAX #: [phone]). ATTEST/CO-SIGN IF ACCESSING VIA [employer]. THANK YOU.**    I certify that I have examined the patient below and determined that Physical Medicine and Rehabilitation service is necessary and that I approve the established plan of care for up to 90 days or as specifically noted. Attestation, signature or co-signature of physician indicates approval of certification requirements.    ________________________ ____________ __________  Physician Signature   Date   Time  [address] [facility]  PHYSICAL THERAPY  [x] EVALUATION    [x]  [facility]     Date: 2021  Patient Name:  Chaim Vizcarra  : 1960  MRN: [id number]      Referring Practitioner Worthy Canavan, PA*   Diagnosis Malignant neoplasm of upper-outer quadrant of right female breast [C50.411]    Treatment Diagnosis R breast lymphedema, R shoulder tightness   Date of Evaluation 21    Additional Pertinent History thyroid disease, osteoporosis      Functional Outcome Measure Used O: QuickDASH   Functional Outcome Score 2.3% impaired (21)       Insurance: Primary: Payor: [address] /  /  / ,   Secondary:    Authorization Information: Aquatics and modalities covered but no ionto or massage   Visit # 1, 1/10 for progress note   Visits Allowed: Unlimited   Recertification Date: 61   Physician Follow-Up: 5/10/21 Jocelyn, 21 Yanira Roy CNP   Physician Orders: Limited R shoulder ROM and Lymphedema   History of Present Illness: 20 R IDC ER/SD+, HER2-, 20 R Lumpectomy with SLNB with 0/1 node +, 20 re-excision, completed radiation on 2020, on Arimidex     SUBJECTIVE: Pt reports has had chronic R lateral and inferior breast tenderness since radiation. Notes fullness at right lateral trunk and visible in mirror with arms raised overhead.  Admits intermittent R UE tenderness in the mornings but it abolishes by end of day. Denies any change in R  strength or girth of lower arm/hand but states upper arm seems larger. Notes has had tightness in R subscap/lats since surgery and radiation but is able to improve her HEP. Compliant with HEP from PT previously but has not returned to weight training or yoga. Social/Functional History and Current Status:  Medications and Allergies have been reviewed and are listed on Medical History Questionnaire. Ramond Dancer lives with spouse in a single story home with stairs and no handrail to enter.     Task Previous Current   ADLs  Independent Modified Independent   Ambulation Independent Independent   Transfers Independent Independent   Recreation Independent Independent   Community Integration Independent Independent   Driving Active  Active    Work Retired  Retired      OBJECTIVE:   Posture: Good  Palpation: Tight R subscap and lats, firm edema at lateral and inferior aspects of R breast; moderate tenderness with palpation to R breast - would benefit from use of LymphaTouch to perform drainage techniques for improved patient comfort.   Observation: Enlarged pores at base of breast    Range of Motion/Strength (Range of Motion in degrees)    Right Left Comments   Shoulder Flexion PROM 173 180 175 deg on R 11/23/20   Shoulder ABDuction PROM 180 180    Shoulder Strength 4+/5 4+/5    Elbow Strength 5/5 5/5      Circumferential Measurements:   Upper Extremity Circumferential Measurements    Right (cm) Left (cm) Comments   Met Heads 18.3 18    Ulnar Styloid Process 14.8 14.6    10 cm distal to Lateral Epicondyle 23.7 23    Elbow Crease 24 23.7    10 cm proximal from Lateral Epicondyle 30 30    Axilla 32 32.5    Total 142.8 141.8 Axilla - to - Axilla: NT      11/23/20:  140.6   140     11/6/20:                      142          139.1                                       9/10/20:                      140 compression sleeve and post lumpectomy compression pad for reduction and management of symptoms for improved quality of life. Patient Education: Plan of care, goals. See \"Treatment Initiated\" for further details. Educated on use of compression sleeve for 6-10 hours per day x4 weeks with use of post lumpectomy compression pad at night to reduce risk of progression to UE and to reduce edema at breast. Shown options for compression pad and bra types to improve compression distribution across tissue. Education Outcome: Verbalized understanding  Education Barriers: None    PLAN:  Treatment Recommendations: Strengthening, Range of Motion, Lymphedema Management, Manual Techniques, Home Exercise Prescription and Safety Education    Plan of care initiated. Plan to see patient up to 2 times per week for 12 weeks to address the treatment planned outlined above.     Time In 1340   Time Out 1440   Timed Code Minutes: 25 min   Total Treatment Time: 60 min       Electronically Signed by: Lashell Batista PT, DPT, SHANELLE [id number] 2/18/2021

## 2021-02-21 NOTE — PROGRESS NOTES
Oriented to sds      10   .pt was asked and agreed to first name and last initial being put on white boards. Allergy and fall risks applied. SCD Applied to patient. Warming blanket applied to patient. Pt denies any abuse or thoughts of suicide. H/O Crohn's disease

## 2021-02-24 ENCOUNTER — HOSPITAL ENCOUNTER (OUTPATIENT)
Dept: PHYSICAL THERAPY | Age: 61
Setting detail: THERAPIES SERIES
Discharge: HOME | End: 2021-02-24
Payer: COMMERCIAL

## 2021-02-24 PROCEDURE — 97140 MANUAL THERAPY 1/> REGIONS: CPT

## 2021-02-24 NOTE — PROGRESS NOTES
[address] [facility]  ONCOLOGY REHABILITATION  PHYSICAL THERAPY  [x] DAILY NOTE [] PROGRESS NOTE [] DISCHARGE NOTE    [x] [facility]     Date: 2021  Patient Name:  Chaim Vizcarra  : 1960  MRN: [id number]       Referring Practitioner Worthy Canavan, PA*   Diagnosis Malignant neoplasm of upper-outer quadrant of right female breast [C50.411]    Treatment Diagnosis R breast lymphedema, R shoulder tightness   Date of Evaluation 21    Additional Pertinent History thyroid disease, osteoporosis       Functional Outcome Measure Used O: QuickDASH   Functional Outcome Score 2.3% impaired (21)        Insurance: Primary: Payor: Mikala Duque /  /  / ,   Secondary:    Authorization Information: Aquatics and modalities covered but no ionto or massage   Visit # 2, 2/10 for progress note   Visits Allowed: Unlimited   Recertification Date:    Physician Follow-Up: 5/10/21 Jocelyn, 21 Yanira Roy CNP   Physician Orders: Limited R shoulder ROM and Lymphedema   History of Present Illness: 20 R IDC ER/[place]+, HER2-, 20 R Lumpectomy with SLNB with 0/1 node +, 20 re-excision, completed radiation on 2020, on Arimidex      SUBJECTIVE: Has been wearing sleeve without issue. States has been trying to find a bra with a wider band and has been having great difficulty finding something that works. Purchased compression pad but waiting for delivery.     TREATMENT   Precautions: Osteoporosis   Pain: 0/10, tenderness/achiness at R upper arm     X in shaded column indicates activity completed today   Modalities Parameters/  Location   Notes             Manual Therapy Time/Technique   Notes   PORi protocol to R upper quadrant 45 minutes x Manual lymphatic drainage and myofascial release with LymphaTouch  mmHg 60 Hz   Exercise/Intervention     Notes                  Specific Interventions for Next Treatment:  PORi protocol for gentle manual lymphatic drainage and myofascial release, gentle stretches, strengthening, lymphedema education as needed    Activity Tolerance: Patient tolerated treatment well however pt very tender at superior aspect of breast.    ASSESSMENT:  Assessment: Pt with significant decrease in heaviness by end of session at R breast. Pt would benefit from compression type bra to prevent refilling after treatment. GOALS:  Patient Goal: Get rid of lymphedema, improve tightness at R shoulder     Short Term Goals to be met in 12 weeks:  1. See LTGs     Long Term Goals to be met in 12 weeks:   1. Pt to demo R shoulder PROM flexion improved from 173 deg to 175 deg for improved tolerance of reaching overhead. 2. Pt to demo R breast lymphedema and pain abolished for improved tolerance of daily activities. 3. Pt to demo R UE lymphedema measures controlled at <145 cm with progressive strengthening program to reduce risk of lymphedema after surgery. 4. Pt to be independent with don/doff of compression sleeve and post lumpectomy compression pad for reduction and management of symptoms for improved quality of life. Patient Education: Keep looking for compression bra. Wear compression sleeve as tolerated and focus on MLD 1-2x per day using enough pressure to get to the firm tissue.   Education Outcome: Verbalized understanding  Education Barriers: None    PLAN: Continue established plan of care      Time In 0850   Time Out 0935   Timed Code Minutes: 45 min   Total Treatment Time: 45 min       Electronically Signed by: Stefan Gandhi PT, DPT, [facility] [id number] 2/24/2021

## 2021-03-02 RX ORDER — ANASTROZOLE 1 MG/1
1 TABLET ORAL DAILY
Qty: 30 TABLET | Refills: 3 | Status: SHIPPED | OUTPATIENT
Start: 2021-03-02 | End: 2021-07-01

## 2021-03-04 ENCOUNTER — HOSPITAL ENCOUNTER (OUTPATIENT)
Dept: PHYSICAL THERAPY | Age: 61
Setting detail: THERAPIES SERIES
Discharge: HOME | End: 2021-03-04
Payer: COMMERCIAL

## 2021-03-04 PROCEDURE — 97140 MANUAL THERAPY 1/> REGIONS: CPT

## 2021-03-04 NOTE — PROGRESS NOTES
[address] [facility]  ONCOLOGY REHABILITATION  PHYSICAL THERAPY  [x] DAILY NOTE [] PROGRESS NOTE [] DISCHARGE NOTE    [x] [facility]     Date: 3/4/2021  Patient Name:  Jaz Grimm  : 1960  MRN: [id number]       Referring Practitioner ILA Barber*   Diagnosis Malignant neoplasm of upper-outer quadrant of right female breast [C50.411]    Treatment Diagnosis R breast lymphedema, R shoulder tightness   Date of Evaluation 21    Additional Pertinent History thyroid disease, osteoporosis       Functional Outcome Measure Used O: QuickDASH   Functional Outcome Score 2.3% impaired (21)        Insurance: Primary: Payor: HeadCountdw[employer] Craig /  /  / ,   Secondary:    Authorization Information: Aquatics and modalities covered but no ionto or massage   Visit # 3, 3/10 for progress note   Visits Allowed: Unlimited   Recertification Date: 32   Physician Follow-Up: 5/10/21 Jocelyn, 21 Angela Jones CNP   Physician Orders: Limited R shoulder ROM and Lymphedema   History of Present Illness: 20 R IDC ER/MI+, HER2-, 20 R Lumpectomy with SLNB with 0/1 node +, 20 re-excision, completed radiation on 2020, on Arimidex      SUBJECTIVE: Reports firm tissue is softer but notes more tightness at R axilla. She thinks it is due to sports bra being too tight.     TREATMENT   Precautions: Osteoporosis   Pain: 0/10, tenderness/achiness at R upper arm     X in shaded column indicates activity completed today   Modalities Parameters/  Location   Notes             Manual Therapy Time/Technique   Notes   PORi protocol to R upper quadrant 45 minutes x Manual lymphatic drainage and myofascial release with LymphaTouch 100 mmHg 60-40Hz extra small treatment cup with 1 sec cycles   Exercise/Intervention     Notes                  Specific Interventions for Next Treatment:  PORi protocol for gentle manual lymphatic drainage and myofascial release, gentle stretches, strengthening, lymphedema education as needed    Activity Tolerance: Patient tolerated treatment well reduced tenderness today    ASSESSMENT:  Assessment: Improved heaviness of breast by end of session and pt reports no longer had pin-like pain with shoulder flexion at axilla. GOALS:  Patient Goal: Get rid of lymphedema, improve tightness at R shoulder     Short Term Goals to be met in 12 weeks:  1. See LTGs     Long Term Goals to be met in 12 weeks:   1. Pt to demo R shoulder PROM flexion improved from 173 deg to 175 deg for improved tolerance of reaching overhead. 2. Pt to demo R breast lymphedema and pain abolished for improved tolerance of daily activities. 3. Pt to demo R UE lymphedema measures controlled at <145 cm with progressive strengthening program to reduce risk of lymphedema after surgery. 4. Pt to be independent with don/doff of compression sleeve and post lumpectomy compression pad for reduction and management of symptoms for improved quality of life.     Patient Education: Trial lat stretch in sitting at kitchen table for residual tightness  Education Outcome: Verbalized understanding  Education Barriers: None    PLAN: Continue established plan of care with follow up in 2 weeks      Time In 1530   Time Out 1615   Timed Code Minutes: 45 min   Total Treatment Time: 45 min       Electronically Signed by: Ailyn Araujo PT, DPT, SHANELLE [id number] 3/4/2021

## 2021-03-11 ENCOUNTER — IMMUNIZATION (OUTPATIENT)
Dept: PRIMARY CARE CLINIC | Age: 61
End: 2021-03-11
Payer: COMMERCIAL

## 2021-03-11 PROCEDURE — 0001A COVID-19, PFIZER VACCINE 30MCG/0.3ML DOSE: CPT

## 2021-03-11 PROCEDURE — 91300 COVID-19, PFIZER VACCINE 30MCG/0.3ML DOSE: CPT

## 2021-03-18 ENCOUNTER — HOSPITAL ENCOUNTER (OUTPATIENT)
Dept: PHYSICAL THERAPY | Age: 61
Setting detail: THERAPIES SERIES
Discharge: HOME | End: 2021-03-18
Payer: COMMERCIAL

## 2021-03-18 PROCEDURE — 97140 MANUAL THERAPY 1/> REGIONS: CPT

## 2021-03-18 NOTE — PROGRESS NOTES
[address] [facility]  ONCOLOGY REHABILITATION  PHYSICAL THERAPY  [x] DAILY NOTE [] PROGRESS NOTE [] DISCHARGE NOTE    [x] [facility]     Date: 3/18/2021  Patient Name:  Vitaly Foley  : 1960  MRN: [id number]       Referring Practitioner ILA Barber*   Diagnosis Malignant neoplasm of upper-outer quadrant of right female breast [C50.411]    Treatment Diagnosis R breast lymphedema, R shoulder tightness   Date of Evaluation 21    Additional Pertinent History thyroid disease, osteoporosis       Functional Outcome Measure Used O: QuickDASH   Functional Outcome Score 2.3% impaired (21)        Insurance: Primary: Payor: [employer] /  /  / ,   Secondary:    Authorization Information: Aquatics and modalities covered but no ionto or massage   Visit # 4, 10 for progress note   Visits Allowed: Unlimited   Recertification Date: 00   Physician Follow-Up: 5/10/21 Jocelyn, 21 Dejon Jarrett CNP   Physician Orders: Limited R shoulder ROM and Lymphedema   History of Present Illness: 20 R IDC ER/DE+, HER2-, 20 R Lumpectomy with SLNB with 0/1 node +, 20 re-excision, completed radiation on 2020, on Arimidex      SUBJECTIVE: States has been using the compression pad, except not last night, and sized up her bra. Admits to intermittent sharp pain at R lateral breast - worsened the day after throwing light-weight object but then pain stopped the day after. Continues with hypersensitivity at R axilla - discussed options for desensitization. Reports overall since starting therapy, has had significant improvement in terms of heaviness/edema at breast but since last session, has taken a step back in terms of shooting pain at lateral breast and wearing a bra that was too tight.     TREATMENT   Precautions: Osteoporosis   Pain: 0/10, tenderness/achiness at R upper arm     X in shaded column indicates activity completed today   Modalities Parameters/  Location   Notes             Manual Therapy Time/Technique   Notes   PORi protocol to R upper quadrant 40 minutes x Manual lymphatic drainage and myofascial release with LymphaTouch 100 mmHg 60-20Hz  small treatment cup with 1 sec cycles   Exercise/Intervention     Notes                  Specific Interventions for Next Treatment:  PORi protocol for gentle manual lymphatic drainage and myofascial release, gentle stretches, strengthening, lymphedema education as needed    Activity Tolerance: Patient tolerated treatment well reduced tenderness today    ASSESSMENT:  Assessment: Firm and full tissue at lateral aspect only which significantly improved by end of session. GOALS:  Patient Goal: Get rid of lymphedema, improve tightness at R shoulder     Short Term Goals to be met in 12 weeks:  1. See LTGs     Long Term Goals to be met in 12 weeks:   1. Pt to demo R shoulder PROM flexion improved from 173 deg to 175 deg for improved tolerance of reaching overhead. 2. Pt to demo R breast lymphedema and pain abolished for improved tolerance of daily activities. 3. Pt to demo R UE lymphedema measures controlled at <145 cm with progressive strengthening program to reduce risk of lymphedema after surgery. 4. Pt to be independent with don/doff of compression sleeve and post lumpectomy compression pad for reduction and management of symptoms for improved quality of life.     Patient Education: Discussed changes to make to bra to allow better fit of compression pad at lateral aspect of chest.  Education Outcome: Verbalized understanding  Education Barriers: None    PLAN: Continue established plan of care 3 week follow up      Time In 0850   Time Out 0930   Timed Code Minutes: 40 min   Total Treatment Time: 40 min       Electronically Signed by: Lashell Batista PT, DPT, [facility] [id number] 3/18/2021

## 2021-04-01 ENCOUNTER — IMMUNIZATION (OUTPATIENT)
Dept: PRIMARY CARE CLINIC | Age: 61
End: 2021-04-01
Payer: COMMERCIAL

## 2021-04-01 PROCEDURE — 91300 COVID-19, PFIZER VACCINE 30MCG/0.3ML DOSE: CPT | Performed by: FAMILY MEDICINE

## 2021-04-01 PROCEDURE — 0002A COVID-19, PFIZER VACCINE 30MCG/0.3ML DOSE: CPT | Performed by: FAMILY MEDICINE

## 2021-04-08 ENCOUNTER — HOSPITAL ENCOUNTER (OUTPATIENT)
Dept: PHYSICAL THERAPY | Age: 61
Setting detail: THERAPIES SERIES
Discharge: HOME | End: 2021-04-08
Payer: COMMERCIAL

## 2021-04-08 PROCEDURE — 97110 THERAPEUTIC EXERCISES: CPT

## 2021-04-08 PROCEDURE — 97535 SELF CARE MNGMENT TRAINING: CPT

## 2021-04-08 NOTE — DISCHARGE SUMMARY
[address] [facility]  ONCOLOGY REHABILITATION  PHYSICAL THERAPY  [] DAILY NOTE [] PROGRESS NOTE [x] DISCHARGE NOTE    [x] [facility]     Date: 2021  Patient Name:  Sandi Grace  : 1960  MRN: [id number]       Referring Practitioner ILA Prescott*   Diagnosis Malignant neoplasm of upper-outer quadrant of right female breast [C50.411]    Treatment Diagnosis R breast lymphedema, R shoulder tightness   Date of Evaluation 21    Additional Pertinent History thyroid disease, osteoporosis       Functional Outcome Measure Used O: QuickDASH   Functional Outcome Score 2.3% impaired (21)   0% 21r       Insurance: Primary: Payor: [facility] /  /  / ,   Secondary:    Authorization Information: Aquatics and modalities covered but no ionto or massage   Visit # 5, 5/10 for progress note   Visits Allowed: Unlimited   Recertification Date:    Physician Follow-Up: 5/10/21 Jocelyn, 21 Clemente Lepe CNP   Physician Orders: Limited R shoulder ROM and Lymphedema   History of Present Illness: 20 R IDC ER/IN+, HER2-, 20 R Lumpectomy with SLNB with 0/1 node +, 20 re-excision, completed radiation on 2020, on Arimidex      SUBJECTIVE: Reports overall symptoms have continued to improve but notes intermittent increased edema at times that leads to sharp, shooting pains. Has been trying to log symptoms and notes link to her diet with her swelling and pain. Admits when she wears her compression bra she has less tightness at lateral pec.      TREATMENT   Precautions: Osteoporosis   Pain: 1/10, tenderness/achiness at R upper arm     X in shaded column indicates activity completed today   Modalities Parameters/  Location   Notes             Manual Therapy Time/Technique   Notes   PORi protocol to R upper quadrant 40 minutes  Manual lymphatic drainage and myofascial release with LymphaTouch 100 mmHg 60-20Hz  small treatment cup with 1 sec cycles Exercise/Intervention     Notes                  Upper Extremity Circumferential Measurements     Right (cm) Left (cm) Comments   Met Heads 18.5 18.7     Ulnar Styloid Process 15 14.5     10 cm distal to Lateral Epicondyle 23.1 23.3     Elbow Crease 23.6 24     10 cm proximal from Lateral Epicondyle 29.7 29.8     Axilla 31 32     Total 140.9 142.3 Axilla - to - Axilla: NT                                      2/18/21:  142.8    141.8       11/23/20:                     140.6      140                                    11/6/20:                      142          139. 1                                       2/14/38:                      791          138. 6                                       2/58/48:                      225          858. 4                                     8/7/20:                         140. 2       585. 5         Specific Interventions for Next Treatment:  PORi protocol for gentle manual lymphatic drainage and myofascial release, gentle stretches, strengthening, lymphedema education as needed    Activity Tolerance: Tolerated treatment well. ASSESSMENT:  Assessment: Progress towards goals reviewed with thorough education on self measurement with PT's measurements provided and discussed how to determine appropriate wearing schedule of sleeve and compression pad. Note overall significant improvement in breast lymphedema and subjective complaints. No further education recommended at this time and to discharge to [facility]. Pt in agreement. GOALS:  Patient Goal: Get rid of lymphedema, improve tightness at R shoulder - GOAL MET. Symptoms improved and controlled.     Short Term Goals to be met in 12 weeks:  1. See LTGs     Long Term Goals to be met in 12 weeks:   1. Pt to demo R shoulder PROM flexion improved from 173 deg to 175 deg for improved tolerance of reaching overhead. GOAL MET:  175 deg. Discontinue Goal    2.  Pt to demo R breast lymphedema and pain abolished for improved tolerance of daily activities. GOAL MET:  Pt with 1/10 pain today and minimal lymphedema. States she no longer has constant daily symptoms and has periods of time without symptoms. .  Discontinue Goal    3. Pt to demo R UE lymphedema measures controlled at <145 cm with progressive strengthening program to reduce risk of lymphedema after surgery. GOAL MET:  140.9 cm. Discontinue Goal    4. Pt to be independent with don/doff of compression sleeve and post lumpectomy compression pad for reduction and management of symptoms for improved quality of life. GOAL MET:  Pt is independent with compression garment/sleeve and has been educated on how to wean appropriately. .  Discontinue Goal      Patient Education: Progress towards goals, how to transition from daily use of compression sleeve and pad for long term maintenance as well as how to monitor and adjust as symptoms change  Education Outcome: Verbalized understanding  Education Barriers: None    PLAN: Discharge      Time In 0850   Time Out 0930   Timed Code Minutes: 40 min   Total Treatment Time: 40 min       Electronically Signed by: Lucia Lino PT, DPT, [facility] [id number] 4/8/2021

## 2021-04-20 ENCOUNTER — HOSPITAL ENCOUNTER (OUTPATIENT)
Dept: INFUSION THERAPY | Age: 61
Discharge: HOME | End: 2021-04-20
Payer: COMMERCIAL

## 2021-04-20 ENCOUNTER — OFFICE VISIT (OUTPATIENT)
Dept: ONCOLOGY | Age: 61
End: 2021-04-20
Payer: COMMERCIAL

## 2021-04-20 VITALS
SYSTOLIC BLOOD PRESSURE: 119 MMHG | RESPIRATION RATE: 18 BRPM | TEMPERATURE: 97 F | HEART RATE: 76 BPM | WEIGHT: 138.6 LBS | HEIGHT: 60 IN | OXYGEN SATURATION: 96 % | BODY MASS INDEX: 27.21 KG/M2 | DIASTOLIC BLOOD PRESSURE: 69 MMHG

## 2021-04-20 DIAGNOSIS — Z79.811 PROPHYLACTIC USE OF ANASTROZOLE (ARIMIDEX): ICD-10-CM

## 2021-04-20 DIAGNOSIS — C50.919 MALIGNANT NEOPLASM OF BREAST IN FEMALE, ESTROGEN RECEPTOR POSITIVE, UNSPECIFIED LATERALITY, UNSPECIFIED SITE OF BREAST (HCC): Primary | ICD-10-CM

## 2021-04-20 DIAGNOSIS — Z85.3 ENCOUNTER FOR FOLLOW-UP SURVEILLANCE OF BREAST CANCER: ICD-10-CM

## 2021-04-20 DIAGNOSIS — Z08 ENCOUNTER FOR FOLLOW-UP SURVEILLANCE OF BREAST CANCER: ICD-10-CM

## 2021-04-20 DIAGNOSIS — R07.81 RIB PAIN ON RIGHT SIDE: ICD-10-CM

## 2021-04-20 DIAGNOSIS — Z17.0 MALIGNANT NEOPLASM OF BREAST IN FEMALE, ESTROGEN RECEPTOR POSITIVE, UNSPECIFIED LATERALITY, UNSPECIFIED SITE OF BREAST (HCC): Primary | ICD-10-CM

## 2021-04-20 DIAGNOSIS — Z98.890 STATUS POST RIGHT BREAST LUMPECTOMY: ICD-10-CM

## 2021-04-20 PROCEDURE — 99211 OFF/OP EST MAY X REQ PHY/QHP: CPT

## 2021-04-20 PROCEDURE — 99215 OFFICE O/P EST HI 40 MIN: CPT | Performed by: PHYSICIAN ASSISTANT

## 2021-04-20 NOTE — PATIENT INSTRUCTIONS
1. Will order mammogram - to be completed in July 2021  2. Will order chest xray to follow up on right rib cage pain  3. Move appointment with Dr. Frank Matute from May to July 2021  4. Please call for questions or concerns.

## 2021-04-20 NOTE — PROGRESS NOTES
[facility] [address] [facility] [address] [facility] [address]  [facility] [address]  Dept: [phone]  Dept Fax: [phone]: [phone]      Visit Date:4/20/2021     Xochitl Barbosa is a 56 y.o. female who presents today for:   Survivorship Visit    HPI:   Xochitl Barbosa is a 56 y.o. female with history of breast cancer. The patient has completed treatment at [address] [address]. Her cancer team includes:  General Surgery: Dr. Bolton Stage Oncology: Dr. Krystyna Coreas Oncology: Dr. Tricia Huffman: n/a      Treatment history includes:   She had an annual screening mammogram on July 31, 2020 that showed 0.9 cm round high density spiculated mass in the right breast.  She underwent core biopsy on August 4, 2020 that showed low grade invasive ductal cancer of the right breast.  Tumor cells were estrogen receptor positive, progesterone receptor positive. HER-2/deysi receptors stain 2+ by IHC.  FISH was negative for HER-2 amplification. Prior to the biopsy she complained of breast pain in the right breast and denied galactorrhea. The patient underwent lumpectomy and sentinel lymph node mapping and biopsy per Dr. Gardenia Jama.  Pathology report from the surgery on August 18, 2020 showed:  A. Right breast, lumpectomy:    Invasive ductal adenocarcinoma, [place] grade 1.    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    Skin (anterior) margin focally positive for invasive carcinoma.    DCIS 1mm from skin (anterior) margin.    Pathologic Stage: pT1b, pN0(sn). B. [place] lymph node, right, excision:    Negative for malignancy (0/1).     Due to positive inferior margin on August 27, 2020 she underwent reexcision.  Pathology report showed:  Breast, right, additional margin:    Biopsy site changes.    Microcalcifications within benign breast tissue.    No evidence of residual malignancy. The patient declined genetic testing.  The patient's Oncotype Dx score was 16 and was not oz (62.9 kg)   Height: 5' (1.524 m)       General appearance: No apparent distress, well developed and cooperative. HEENT: Pupils equal, round, and reactive to light. Conjunctivae/corneas clear. Neck: Supple, with full range of motion. Trachea midline. Respiratory:  Normal respiratory effort. Clear to auscultation, bilaterally without Rales/Wheezes/Rhonchi. Cardiovascular: Regular rate and rhythm with normal S1/S2   Chest: bilateral breast exam completed with mild lymphedema to inferior lateral portion of right breast. Tenderness with breast exam which patient reports is chronic. There is pain with palpation over the lateral rib without palpable abnormalities. No nipple discharge or inversion. Abdomen: Soft, non-distended with active bowel sounds. Musculoskeletal: No clubbing, cyanosis or edema bilaterally. Skin: Skin color, texture, turgor normal.  No visible rashes or lesions. Neurologic:  Neurovascularly intact without any focal sensory/motor deficits. Psychiatric: Alert and oriented    Survivorship Recommendations: The patient has completed her breast cancer survivorship visit today. She was provided breast cancer survivorship care plan prepared by oncology nurse navigator and reviewed by myself. Care plan along with note from today's visit will be given to the patient's PCP Ko Julian DO. Concerns addressed at today's visit includin. Right Sided Rib Cage Pain - unclear etiology, no recent injury or prior injury. Possibly related to AI; however, isolated. Will obtain xray for further evaluation. 1. Screenings Recommended:    A. Annual Mammogram - Patient due in 2021. Ordered and scheduled at today's visit. B. Regular Gynecologic Follow Up - Patient will follow up with PCP. C. If on aromatase inhibitor:      - DEXA Scan every 2 years - Completed 2020 showing osteoporosis. Patient due 2022.     - Optimize bone health with calcium and vitamin D.   D. individual risk   G: Immunizations: as recommended per CDC    3. Services/Referrals Provided:    A. Financial Navigation - Declined. Offered follow up call. [address] [facility]. C. Rehabilitation services including PT Referral, OT Referral, ST Referral as indicated. Not indicated. D. Psychological support including , pastoral care, support groups. E. Genetic Counselor Evaluation: Declined. F. Education Opportunities - Patient was provided nutritional handout for breast cancer survivors. G. Smoking Cessation - Not indicated. On this date 4/20/2021 I have spent 55 minutes reviewing previous notes, test results and face to face with the patient discussing the diagnosis and importance of compliance with the treatment plan as well as documenting on the day of the visit.     Electronically signed by   Agnes Stewart PA-C

## 2021-04-30 ENCOUNTER — TELEPHONE (OUTPATIENT)
Dept: ONCOLOGY | Age: 61
End: 2021-04-30

## 2021-04-30 DIAGNOSIS — R35.0 URINARY FREQUENCY: Primary | ICD-10-CM

## 2021-04-30 NOTE — TELEPHONE ENCOUNTER
Patient is asking if she can come in for a urinalysis. She thinks she may have a UTI. No burning with urination, no fever but maybe some chills, bladder area feels achy, urine has no odor, experiencing frequent urination including overnight. Please advise.

## 2021-05-19 ENCOUNTER — HOSPITAL ENCOUNTER (OUTPATIENT)
Dept: RADIATION ONCOLOGY | Age: 61
Discharge: HOME | End: 2021-05-19
Attending: RADIOLOGY
Payer: COMMERCIAL

## 2021-05-19 VITALS
DIASTOLIC BLOOD PRESSURE: 57 MMHG | HEART RATE: 75 BPM | OXYGEN SATURATION: 98 % | TEMPERATURE: 98.2 F | BODY MASS INDEX: 26.13 KG/M2 | SYSTOLIC BLOOD PRESSURE: 119 MMHG | RESPIRATION RATE: 16 BRPM | WEIGHT: 133.8 LBS

## 2021-05-19 PROCEDURE — 99215 OFFICE O/P EST HI 40 MIN: CPT | Performed by: NURSE PRACTITIONER

## 2021-05-19 PROCEDURE — 99212 OFFICE O/P EST SF 10 MIN: CPT | Performed by: NURSE PRACTITIONER

## 2021-05-19 NOTE — PROGRESS NOTES
testing was performed, returning with a recurrence score of 16 (low risk).  Based on the clinical stage an Oncotype score, Washington Echeverria has received a recommendation for adjuvant endocrine therapy for her systemic management. Mike Snow was seen 9/28/2020 for evaluation and discussion regarding the role of radiation therapy in the management of her early stage right breast cancer.     Washington Echeverria did develop moderate erythema to the right breast and a burning/warm sensation towards the end of treatment. She did have tight feeling in the axilla and was following with physical therapy for this. She did not experience any unexpected side effects during her course of treatment.     INTERVAL HISTORY: Washington Echeverria returns to [address] [place] [address] for a post treatment follow up after undergoing surgery and radiation for breast cancer. Washington Echeverria states she continues to have tenderness and lymphedema to the right breast. She does do massage and use compression pad at night which does help but does not take it completely away. She also notes there is still tightness in the back and axilla area, she states she is doing stretching exercises on a daily basis. She denies chest pain, sob, fever, chills, nausea, abdominal pain, changes in bowel or bladder habits, unintentional weight loss, loss of appetite, arthralgias. LAB RESULTS:   No recent labs for review. RADIOLOGY RESULTS:   11/17/2020 DEXA:   IMPRESSION: OSTEOPOROSIS   Patient is at high risk for fracture.  The patient meets criteria    for a bone fragility clinic.  Patient consult w/primary care    provider is recommended.         MEDICATIONS:   Current Outpatient Medications   Medication Sig Dispense Refill    Cholecalciferol 400 UNIT TABS tablet Take 400 Units by mouth daily      Glucosamine-Chondroitin--200-150 MG TABS Take 1 tablet by mouth daily      anastrozole (ARIMIDEX) 1 MG tablet Take 1 tablet by mouth daily 30 tablet 3    thyroid (ARMOUR THYROID) 15 MG tablet Take 15 mg by mouth daily Alternate every other day with 30 mg       No current facility-administered medications for this encounter. ROS: As noted in the HPI and Interval history, otherwise negative. EXAMINATION:   GENERAL: Trevor Joyner is a pleasant well developed adult female. She is alert and oriented x3 in no acute distress. VITAL SIGNS:  Weight 133 pounds. Temperature: 98.2 F, Pulse 75, /57, Respirations 16, Pulse ox 98% room air. HEENT: Normocephalic, atraumatic. PERRL. EOMI. Ears, nose and lips are within normal limits on external examination. Oropharynx unremarkable. NECK: Without palpable cervical adenopathy. LYMPH: Without palpable supraclavicular or axillary adenopathy. LUNGS: Clear without adventitious sounds. Respirations easy and unlabored. HEART: Regular rate and rhythm without murmur. BREASTS: Left breast is soft, nontender. There are no suspicious masses, lumps or architectural distortions. Nipple everted without discharge. Right breast is soft, tender with palpation. Post treatment swelling and residual hyperpigmentation noted. There are no suspicious masses or lumps. Surgical incision site without signs of infection. Nipple everted without discharge. ABDOMEN: Soft, nontender, nondistended. Active bowel sounds x4. EXTREMITIES: Without clubbing or cyanosis. No edema noted. Full ROM bilateral upper extremities. NEUROLOGIC EXAMINATION: Cranial nerves grossly intact.         ASSESSMENT: Trevor Joyner was diagnosed with right breast cancer in August 2020. She underwent treatment with surgery and radiation. Since completing radiation she has started on Arimidex, which she will be on for the next 5 years. Trevor Joyner continues on Arimidex and has not had any worsening side effects. She has had DEXA scan which did show osteoporosis, which she has know prior to diagnosis. She has been on different treatments for this but did not tolerate them.  At this time she is taking vitamin D and does exercise regularly. Left breast + lymphedema and mild residual hyperpigmentation. She does not have concerning findings on today's physical exam.     PLAN:  1. Mammogram: Due in July order already placed per Medical oncology  2. Arimidex: tolerating with no new side effects. 3. DEXA: 11/17/20: Osteoporosis. Has tried treatment for this in the past but has not been able to tolerate it. PCP following this, she is taking vitamin D and exercising regularly. 4. Right breast lymphedema: controlled but does persists, using compression pad at night, massage on a daily basis. 5. Continue follow up with other providers as scheduled. 6. Follow up here in 6 months. 9. Nia Marin is aware she can call for questions, concerns or changes in condition. Electronically signed by WILIAN Farias CNP on 5/19/21 at 9:44 AM EDT    ATTESTATION:  I have spent 45 minutes reviewing previous notes, test results and face to face with the patient discussing the diagnosis and importance of compliance with the treatment plan as well as documenting on the day of the visit. CC: Juju Li; SAMANTA Goodson

## 2021-07-01 RX ORDER — ANASTROZOLE 1 MG/1
TABLET ORAL
Qty: 30 TABLET | Refills: 0 | Status: SHIPPED | OUTPATIENT
Start: 2021-07-01 | End: 2021-07-28 | Stop reason: SDUPTHER

## 2021-07-08 ENCOUNTER — TELEPHONE (OUTPATIENT)
Dept: ONCOLOGY | Age: 61
End: 2021-07-08

## 2021-07-08 DIAGNOSIS — Z12.31 SCREENING MAMMOGRAM, ENCOUNTER FOR: Primary | ICD-10-CM

## 2021-07-08 NOTE — TELEPHONE ENCOUNTER
Patient should have screening mammogram. New order placed with updated code. Please let me know if there are any questions.

## 2021-07-08 NOTE — TELEPHONE ENCOUNTER
[facility] needs clarification on the type of mammo you want patient to have, the codes you have in now do not work. Please advise, thank you.

## 2021-07-14 ENCOUNTER — HOSPITAL ENCOUNTER (OUTPATIENT)
Dept: WOMENS IMAGING | Age: 61
Discharge: HOME | End: 2021-07-14
Payer: COMMERCIAL

## 2021-07-14 DIAGNOSIS — Z12.31 SCREENING MAMMOGRAM, ENCOUNTER FOR: ICD-10-CM

## 2021-07-14 PROCEDURE — 77063 BREAST TOMOSYNTHESIS BI: CPT

## 2021-07-28 ENCOUNTER — HOSPITAL ENCOUNTER (OUTPATIENT)
Dept: INFUSION THERAPY | Age: 61
Discharge: HOME | End: 2021-07-28
Payer: COMMERCIAL

## 2021-07-28 ENCOUNTER — OFFICE VISIT (OUTPATIENT)
Dept: ONCOLOGY | Age: 61
End: 2021-07-28
Payer: COMMERCIAL

## 2021-07-28 VITALS
OXYGEN SATURATION: 98 % | RESPIRATION RATE: 16 BRPM | HEART RATE: 76 BPM | HEIGHT: 62 IN | WEIGHT: 133.8 LBS | SYSTOLIC BLOOD PRESSURE: 108 MMHG | DIASTOLIC BLOOD PRESSURE: 57 MMHG | TEMPERATURE: 98.3 F | BODY MASS INDEX: 24.62 KG/M2

## 2021-07-28 DIAGNOSIS — M81.0 AGE RELATED OSTEOPOROSIS, UNSPECIFIED PATHOLOGICAL FRACTURE PRESENCE: ICD-10-CM

## 2021-07-28 DIAGNOSIS — Z08 ENCOUNTER FOR FOLLOW-UP SURVEILLANCE OF BREAST CANCER: ICD-10-CM

## 2021-07-28 DIAGNOSIS — Z98.890 STATUS POST RIGHT BREAST LUMPECTOMY: ICD-10-CM

## 2021-07-28 DIAGNOSIS — C50.919 MALIGNANT NEOPLASM OF BREAST IN FEMALE, ESTROGEN RECEPTOR POSITIVE, UNSPECIFIED LATERALITY, UNSPECIFIED SITE OF BREAST (HCC): Primary | ICD-10-CM

## 2021-07-28 DIAGNOSIS — Z85.3 ENCOUNTER FOR FOLLOW-UP SURVEILLANCE OF BREAST CANCER: ICD-10-CM

## 2021-07-28 DIAGNOSIS — Z79.811 PROPHYLACTIC USE OF ANASTROZOLE (ARIMIDEX): ICD-10-CM

## 2021-07-28 DIAGNOSIS — Z17.0 MALIGNANT NEOPLASM OF BREAST IN FEMALE, ESTROGEN RECEPTOR POSITIVE, UNSPECIFIED LATERALITY, UNSPECIFIED SITE OF BREAST (HCC): Primary | ICD-10-CM

## 2021-07-28 PROCEDURE — 99211 OFF/OP EST MAY X REQ PHY/QHP: CPT

## 2021-07-28 PROCEDURE — 99214 OFFICE O/P EST MOD 30 MIN: CPT | Performed by: INTERNAL MEDICINE

## 2021-07-28 RX ORDER — ANASTROZOLE 1 MG/1
1 TABLET ORAL DAILY
Qty: 30 TABLET | Refills: 11 | Status: SHIPPED | OUTPATIENT
Start: 2021-07-28 | End: 2022-08-01 | Stop reason: SDUPTHER

## 2021-07-28 ASSESSMENT — ENCOUNTER SYMPTOMS
SORE THROAT: 0
SHORTNESS OF BREATH: 0
ABDOMINAL PAIN: 0
COUGH: 0
NAUSEA: 0
BLOOD IN STOOL: 0
CHEST TIGHTNESS: 0
FACIAL SWELLING: 0
EYE DISCHARGE: 0
DIARRHEA: 0
COLOR CHANGE: 0
TROUBLE SWALLOWING: 0
WHEEZING: 0
CONSTIPATION: 0
BACK PAIN: 0
RECTAL PAIN: 0
ABDOMINAL DISTENTION: 0
VOMITING: 0

## 2021-07-28 NOTE — PROGRESS NOTES
[facility] [facility], [address] [facility],[address]  [place] [facility]  Dept: [phone]  Dept Fax: [phone]: [phone]    Visit Date:7/28/2021     Rell Atwood is a 59 y.o. female who presents today for:   Chief Complaint   Patient presents with    Follow-up     Breast cancer, stage 1, estrogen receptor positive, right         HPI:   Mrs. Yanira Bran is a 40-year-old postmenopausal patients with h/o stage Ia right breast cancer. She presents to the medical oncology clinic to discuss postsurgical treatment. He had an annual screening mammogram on July 31, 2020 that showed 0.9 cm round high density spiculated mass in the right breast.  She underwent core biopsy on August 4, 2020 that showed low grade invasive ductal cancer of the right breast.  Tumor cells were estrogen receptor  Positive, progesterone receptor positive. HER-2/deysi receptors stain 2+ by IHC. FISH was negative for HER-2 amplification. Prior to the biopsy she complained of breast pain in the right breast and denied galactorrhea. Subsequently she met with the surgeon Dr. Yaneli Morrow and after discussing her surgical treatment options she decided to proceed with lumpectomy and sentinel lymph node mapping and biopsy. Pathology report from the surgery on August 18, 2020 showed:  A. Right breast, lumpectomy:    Invasive ductal adenocarcinoma, [place] grade 1.    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    Skin (anterior) margin focally positive for invasive carcinoma.    DCIS 1mm from skin (anterior) margin.    Pathologic Stage: pT1b, pN0(sn). B. [place] lymph node, right, excision:    Negative for malignancy (0/1). Risk assessment: none. She has not been on previous estrogen therapy. Felt something in right axillary area. Due to positive inferior margin on August 27, 2020 she underwent reexcision. Pathology report showed:  Breast, right, additional margin:    Biopsy site changes.  Microcalcifications within benign breast tissue.    No evidence of residual malignancy. Presents the medical oncology clinic to discuss postsurgical treatment. Ms. Felicia Shaw maternal grandmother was diagnosed with pancreatic cancer in her [de-identified]. A maternal uncle was also diagnosed with pancreatic cancer. Otherwise, there are no known cancers in her maternal extended family. Ms. Melvin Canseco elected to decline genetic testing today. She would like additional time to consider her options and discuss the testing with her family members before making a final decision. The patient completed her radiation treatment. She received 5256 cGy in 20 fractions. In November 2020 she started adjuvant hormonal therapy with AI    Interim history on 07/28/2021:  Patient presents to the medical oncology clinic for 6 months follow up visit. she reports that overall she tolerates Arimidex well. She denies having significant arthralgia, minimal hot flashes. The patient had annual mammogram last week that showed benign findings. The patient denies any hospitalizations since last visit with me. She still reports some fullness and discomfort in her right breast.  Otherwise she denies any complaints  HPI   Past Medical History:   Diagnosis Date    Acquired autoimmune hypothyroidism     Breast cancer ([address] 08/2020    Diverticulitis     Dr. Leandra Hays History of kidney stones     History of therapeutic radiation     oct.  2020    Intestinal metaplasia of gastric mucosa         Osteopenia 2006      Past Surgical History:   Procedure Laterality Date    BREAST LUMPECTOMY Right 8/18/2020    RIGHT BREAST LUMPECTOMY AND SENTINEL LYMPH NODE BIOPSY performed by Madisyn Somers MD at [facility] LUMPECTOMY Right 8/27/2020    RE-EXCISION ANTERIOR MARGIN RIGHT BREAST performed by Madisyn Somers MD at [address] [place] [address],[address]  [id number]    x2    COLONOSCOPY  08/2018    Dr. Donya Boswell Left 06/02/2017    With left ureteroscopy, basket retrieval of stone fragments, left ureterenoscopy, basket retrieval of renal stones, and left ureter stent placement-- [address]  [id number]    pelvic    [facility] BIOPSY RIGHT  2020    [address] BIOPSY RIGHT 2020 [address] BIOPSY OF BREAST, NEEDLE CORE Right 2020    idc    UPPER GASTROINTESTINAL ENDOSCOPY      US GUIDED NEEDLE LOC OF RIGHT BREAST  2020    US GUIDED NEEDLE LOC [facility] RIGHT BREAST 2020 [facility]      Family History   Problem Relation Age of Onset    Other Mother         fuches corneal dystrophy, scleroderma, osteoporosis    High Blood Pressure Mother         pulmonary htn    High Blood Pressure Father     Cancer Father         lung    Diabetes Maternal Grandmother     Cancer Maternal Grandmother         pancreatic    Other Maternal Grandfather         fuches corneal dystrophy    Other Sister         fuches corneal dystrophy    Other Sister         fuches corneal dystrophy    Heart Disease Sister         pacemaker    Other Sister         fuches corneal dystrophy, hypothyroidism    Other Sister         hypothyroidism, reynauds      Social History     Tobacco Use    Smoking status: Former Smoker     Packs/day: 1.00     Years: 20.00     Pack years: 20.00     Types: Cigarettes     Quit date: 1997     Years since quittin.5    Smokeless tobacco: Never Used   Substance Use Topics    Alcohol use: No      Current Outpatient Medications   Medication Sig Dispense Refill    anastrozole (ARIMIDEX) 1 MG tablet Take 1 tablet by mouth daily 30 tablet 11    Cholecalciferol 400 UNIT TABS tablet Take 400 Units by mouth daily      Glucosamine-Chondroitin--200-150 MG TABS Take 1 tablet by mouth daily      thyroid (ARMOUR THYROID) 15 MG tablet Take 15 mg by mouth daily Alternate every other day with 30 mg       No current facility-administered medications for this visit. Allergies   Allergen Reactions    Acetaminophen      Gi upset    Aspartame And Phenylalanine     Bextra [Valdecoxib]      urinary retention    Doxycycline      GI    Magnesium Citrate      Internal  Pain, nausea, vomiting, diarrhea    Ciprofloxacin Palpitations    Flagyl [Metronidazole] Palpitations    Neomycin Rash      Health Maintenance   Topic Date Due    Hepatitis C screen  Never done    Pneumococcal 0-64 years Vaccine (1 of 4 - PCV13) Never done    HIV screen  Never done    DTaP/Tdap/Td vaccine (1 - Tdap) Never done    Cervical cancer screen  Never done    Lipid screen  Never done    Colon cancer screen colonoscopy  Never done    Shingles Vaccine (1 of 2) Never done    Flu vaccine (1) 09/01/2021    Breast cancer screen  07/14/2022    COVID-19 Vaccine  Completed    Hepatitis A vaccine  Aged Out    Hepatitis B vaccine  Aged Out    Hib vaccine  Aged Out    Meningococcal (ACWY) vaccine  Aged Out        Subjective:   Review of Systems   Constitutional: Negative for activity change, appetite change, fatigue and fever. HENT: Negative for congestion, dental problem, facial swelling, hearing loss, mouth sores, nosebleeds, sore throat, tinnitus and trouble swallowing. Eyes: Negative for discharge and visual disturbance. Respiratory: Negative for cough, chest tightness, shortness of breath and wheezing. Cardiovascular: Negative for chest pain, palpitations and leg swelling. Gastrointestinal: Negative for abdominal distention, abdominal pain, blood in stool, constipation, diarrhea, nausea, rectal pain and vomiting. Endocrine: Negative for cold intolerance, polydipsia and polyuria. Genitourinary: Negative for decreased urine volume, difficulty urinating, dysuria, flank pain, hematuria and urgency. Musculoskeletal: Negative for arthralgias, back pain, gait problem, joint swelling, myalgias and neck stiffness. Skin: Negative for color change, rash and wound.    Neurological: Reflexes are normal and symmetric. Psychiatric:         Behavior: Behavior normal.         Thought Content: Thought content normal.         Judgment: Judgment normal.         BP (!) 108/57 (Site: Left Upper Arm, Position: Sitting, Cuff Size: Medium Adult)   Pulse 76   Temp 98.3 °F (36.8 °C) (Oral)   Resp 16   Ht 5' 1.5\" (1.562 m)   Wt 133 lb 12.8 oz (60.7 kg)   SpO2 98%   BMI 24.87 kg/m²      ECOG status is 0    Imaging studies and labs:     Lab Results   Component Value Date    WBC 8.3 07/27/2018    HGB 14.3 08/12/2020    HCT 43.9 08/12/2020    MCV 94.1 07/27/2018     07/27/2018       Chemistry        Component Value Date/Time     07/27/2018 1629    K 4.3 07/27/2018 1629    CL 98 07/27/2018 1629    CO2 25 07/27/2018 1629    BUN 15 07/27/2018 1629    CREATININE 0.6 07/27/2018 1629        Component Value Date/Time    CALCIUM 10.0 07/27/2018 1629    BILITOT NEGATIVE 06/01/2017 1006        Mammogram on July 14, 2021 showed:  No mammographic evidence of malignancy. A 1 year screening mammogram is recommended. DEXA study on November 17, 2020 showed:  IMPRESSION: OSTEOPOROSIS    Assessment/Plan:   1. Stage IA (cT1b, cN0, cM0, G1, ER+, IL+, HER2-) right breast cancer. The patient is diagnosed with stage I A hormone responsive right breast cancer. She is status post right lumpectomy with axillary LN biopsy. She presents to the [address] [place] [address] to discuss further management. The patient was informed that she has an early stage of breast cancer. Since her tumor was larger than 5 mm in size and she is younger than 79, my recommendation was to proceed with breast Oncotype DX assay. The Oncotype Dx 21-gene recurrence score (RS) is the best-validated prognostic and predictive assay to identify patients who are most and least likely to derive benefit from adjuvant chemotherapy.  RS 25 or below identifies women with low risk of disease recurrence for whom chemotherapy is unlikely to provide a benefit. Her recurrence score came back as 16, placing her at low risk group. Her risk of distant disease recurrence during the next 9 years is 4% with adjuvant hormonal therapy. Patient completed her radiation treatment to the reminder of her right breast in November 2020. She received total cumulative dose of 5256 cGy in 20 fractions. 2.  Adjuvant hormonal therapy. She started adjuvant hormonal therapy with Arimidex in November 2020. Overall she tolerates it well, minimal hot flashes,  minimal arthralgia. The patient had annual mammogram last week it showed benign findings. She reports some fullness and discomfort in the right breast.  No evidence of disease recurrence on today's physical examination. The patient was instructed to continue Arimidex. 3.  Osteoporosis. The patient had a DEXA study in November 2020 that showed osteoporosis I had a lengthy discussion with the patient about the benefits of bone strengthening medications. The patient is concerned about side effects and she refused Prolia or other bisphonates. 4. Cancer surveillance. Management of breast cancer survivors who have completed active treatment and have no evidence of disease are cancer surveillance, lifestyle modifications including pursuing healthy regular exercise program, minimizing alcohol intake, and refraining from smoking. Survivor follow-up will include updated history, regular physical examination. Radiologic imaging to screen for distant recurrence will be not performed unless the patient will develop new symptoms. Symptoms suspicious for recurrent /metastic disease include:  ?Constitutional symptoms - Anorexia, weight loss, malaise, fatigue, insomnia. ? Bone pain  ? Pulmonary symptoms - persistent cough or dyspnea (at rest or with exertion). ?Neurologic symptoms - Headache, nausea, vomiting, confusion, weakness, numbness or tingling.   ?Gastrointestinal symptoms - Right upper quadrant pain, change in bowel habits, presence of bloody or tarry stools. Return in about 6 months (around 1/27/2022). Orders Placed:   No orders of the defined types were placed in this encounter. Medications Prescribed:   Orders Placed This Encounter   Medications    anastrozole (ARIMIDEX) 1 MG tablet     Sig: Take 1 tablet by mouth daily     Dispense:  30 tablet     Refill:  11            Discussed use, benefit, and side effectsof prescribed medications. All patient questions answered. Pt voiced understanding. Instructed to continue current medications, diet and exercise. Patient agreed with treatment plan. Follow up as directed.     Electronically signed by Yovany Sin MD on 9/2/20 at 2:06 PM EDT

## 2021-08-27 NOTE — PROGRESS NOTES
180 deg    Lymphatic system, lymphedema risk factors and risk reduction strategies reviewed with National [employer] handout provided. Discussed importance of good skin hygiene, preventing infections in arm as well as to utilize muscle-pump action of UE to stimulate lymphatic drainage during times of both inactivity and activity. Encouraged pt to avoid blood pressure readings on affected side as well as carrying purse on affected shoulder. Educated to importance of strengthening arm to further improve the efficiency of the muscle pump action of her UE. Discussed benefits of compression sleeve and that purpose is to also stimulate lymphatic drainage along with maintaining size of arm. Educated that if she wishes to obtain a sleeve, she would need to go through her physician to get a prescription for insurance to cover garment or she can purchase outright. Provided with proper compression class and how to size appropriately. Also provided with handout on lymphedema compression garments and educated on when to wear garment, when to remove, general care of garment and how and where to purchase garment. Specific Interventions for Next Treatment:  Lymphedema education, PORi protocol for gentle manual lymphatic drainage and myofascial release, gentle stretches, strengthening as needed    Activity Tolerance: Patient tolerated treatment well. Reports less tender by end of session. ASSESSMENT:  Assessment: Pt with greatly improved shoulder function and mobility however now awaiting start of radiation. Recommend hold until after radiation begins to recheck tolerance and effect on shoulder mobility and lymphedema status. GOALS:  Patient Goal: Get back to normal quickly, avoid lymphedema     Short Term Goals to be met in 12 weeks:  1. See LTGs      Long Term Goals to be met in 12 weeks:   1.  Pt to demo R shoulder PROM flexion improved from 166 deg to to 174 deg for improved ease with reaching overhead. 2. Pt to demo R shoulder strength returned to 4+/5 after surgery for return to yoga. 3. Pt to demo R shoulder PROM abduction returned to 180 deg after surgery for return to yoga. 4. Pt to demo QuickDASH score improved from 11.4% to 9% for improved ease with daily tasks. 5. Pt to demo R UE lymphedema measures controlled at <144 cm with independent lymphedema risk reduction strategies for safety after discharge. Patient Education: Lymphedema handouts provided as above, to monitor for continued improvement and follow up once radiation has started.   Education Outcome: Verbalized understanding  Education Barriers: None    PLAN: Continue established plan of care with next appointment after radiation has started      Time In 0935   Time Out 1030   Timed Code Minutes: 55 min   Total Treatment Time: 55 min       Electronically Signed by: Paola Smallwood PT, DPT, [facility] [id number] 9/23/2020 heat exhaustion muscle cramps

## 2021-11-24 ENCOUNTER — HOSPITAL ENCOUNTER (OUTPATIENT)
Dept: RADIATION ONCOLOGY | Age: 61
Discharge: HOME | End: 2021-11-24
Attending: RADIOLOGY
Payer: COMMERCIAL

## 2021-11-24 VITALS
HEART RATE: 74 BPM | OXYGEN SATURATION: 99 % | TEMPERATURE: 98.8 F | RESPIRATION RATE: 16 BRPM | DIASTOLIC BLOOD PRESSURE: 69 MMHG | WEIGHT: 130.6 LBS | BODY MASS INDEX: 24.28 KG/M2 | SYSTOLIC BLOOD PRESSURE: 102 MMHG

## 2021-11-24 DIAGNOSIS — C50.411 CARCINOMA OF UPPER-OUTER QUADRANT OF RIGHT BREAST IN FEMALE, ESTROGEN RECEPTOR NEGATIVE (HCC): Primary | ICD-10-CM

## 2021-11-24 DIAGNOSIS — Z17.1 CARCINOMA OF UPPER-OUTER QUADRANT OF RIGHT BREAST IN FEMALE, ESTROGEN RECEPTOR NEGATIVE (HCC): Primary | ICD-10-CM

## 2021-11-24 PROCEDURE — 99212 OFFICE O/P EST SF 10 MIN: CPT | Performed by: NURSE PRACTITIONER

## 2021-11-24 PROCEDURE — 99215 OFFICE O/P EST HI 40 MIN: CPT | Performed by: NURSE PRACTITIONER

## 2021-11-24 ASSESSMENT — PAIN SCALES - GENERAL: PAINLEVEL_OUTOF10: 1

## 2021-11-24 ASSESSMENT — PAIN DESCRIPTION - PAIN TYPE: TYPE: SURGICAL PAIN

## 2021-11-24 ASSESSMENT — PAIN DESCRIPTION - FREQUENCY: FREQUENCY: CONTINUOUS

## 2021-11-24 ASSESSMENT — PAIN DESCRIPTION - LOCATION: LOCATION: BREAST

## 2021-11-24 ASSESSMENT — PAIN DESCRIPTION - ORIENTATION: ORIENTATION: RIGHT

## 2021-11-24 NOTE — PROGRESS NOTES
[facility], [address]  Phone: [phone]   Toll Free: [phone]   Fax: [phone]    RADIATION ONCOLOGY FOLLOW UP REPORT    PATIENT NAME:  Stefanie Angela     : 1960  MEDICAL RECORD NO: [id number]    LOCATION: [facility] NO: [id number]      PROVIDER: WILIAN Trejo CNP        DATE OF SERVICE: 2021    FOLLOW UP PHYSICIANS: Juju Cedillo; SAMANTA Delcid     DIAGNOSIS: C50.611 -- Malignant neoplasm of the axillary tail portion of the right female breast; Infiltrating ductal carcinoma, Grade 1 (with extenbsive intraductal component, DCIS intermediate grade); pT1b pN0(sn) M0, Stage IA; ER+; WA+; Her2-; Oncotype Dx Score 16.     DATE OF DIAGNOSIS: 2020     END OF TREATMENT DATE: 2020     ECOG PERFORMANCE STATUS: 0     PAIN: Denies     CHAPERONE: Declined        HPI: Marilee is a 69-year-old woman who developed pain in the right breast.  She underwent diagnostic mammogram, showing a 9 mm density in the axillary tail of the right breast.  Ultrasound showed a spiculated nodule corresponding to the mammographic abnormality, and was considered highly suggestive of malignancy.  Subsequent biopsy 2020 showed invasive ductal carcinoma, ER positive, ER positive.   HER-2/deysi was equivocal on the initial evaluation, but subsequent additional evaluation indicated that HER-2/deysi is non-amplified. Saida Carpenter was seen for surgical consultation, and had a lengthy discussion about current clinical practice guidelines, treatment options and various aspects of the different management options.  She elected to undergo breast conservation surgery.  Final pathology showed a 9 mm invasive ductal carcinoma with DCIS involving 4 of the 7 examined blocks.  Initial surgical margins were focally positive, and a reexcision was performed 2020 with no evidence of residual malignancy in the specimen.  Oncotype DX testing was performed, returning with a recurrence score of 16 (low risk).  Based on the clinical stage an Oncotype score, Dexter Moore has received a recommendation for adjuvant endocrine therapy for her systemic management.  She was seen 9/28/2020 for evaluation and discussion regarding the role of radiation therapy in the management of her early stage right breast cancer.     Dexter Moore did develop moderate erythema to the right breast and a burning/warm sensation towards the end of treatment. She did have tight feeling in the axilla and was following with physical therapy for this. She did not experience any unexpected side effects during her course of treatment.     INTERVAL HISTORY: Marilee returns to [address] [place] [address] for a post treatment follow up after undergoing surgery and radiation for breast cancer. Dexter Moore states she continues to have tenderness and lymphedema to the right breast. She does do massage and use compression pad at night which does help but does not take it completely away. She also notes there is still tightness in the back and axilla area, she states she is doing stretching exercises on a daily basis. She denies chest pain, sob, fever, chills, nausea, abdominal pain, changes in bowel or bladder habits, unintentional weight loss, loss of appetite, arthralgias. LAB RESULTS:   No recent labs. RADIOLOGY RESULTS:   7/14/21: Bilateral screening mammogram:   Impression   No mammographic evidence of malignancy. A 1 year screening mammogram is recommended.       A result letter will be sent to the patient. Randi Gross will also receive a reminder 1 month prior to her next mammogram.       BI-RADS CATEGORY 2: BENIGN FINDINGS.       Management Recommendation: Routine annual mammography. 11/17/2020 DEXA:   IMPRESSION: OSTEOPOROSIS   Patient is at high risk for fracture.  The patient meets criteria    for a bone fragility clinic.  Patient consult w/primary care    provider is recommended. MEDICATIONS:   Current Outpatient Medications   Medication Sig Dispense Refill    anastrozole (ARIMIDEX) 1 MG tablet Take 1 tablet by mouth daily 30 tablet 11    Cholecalciferol 400 UNIT TABS tablet Take 400 Units by mouth daily      Glucosamine-Chondroitin--200-150 MG TABS Take 1 tablet by mouth daily      thyroid (ARMOUR THYROID) 15 MG tablet Take 15 mg by mouth daily Alternate every other day with 30 mg       No current facility-administered medications for this encounter. ROS: As noted in the HPI and Interval history, otherwise negative. EXAMINATION:   Yue Lui is a pleasant well developed adult female. She is alert and oriented x3 in no acute distress.   VITAL SIGNS:  Weight 130 pounds. Temperature: 98.8 F, Pulse 74, /69, Respirations 16, Pulse ox 99% room air.   HEENT: Normocephalic, atraumatic. PERRL. EOMI. Ears, nose and lips are within normal limits on external examination. Oropharynx unremarkable.   NECK: Without palpable cervical adenopathy.   LYMPH: Without palpable supraclavicular or axillary adenopathy.   LUNGS: Clear without adventitious sounds. Respirations easy and unlabored.   HEART: Regular rate and rhythm without murmur.   BREASTS: Left breast is soft, nontender. There are no suspicious masses, lumps or architectural distortions. Nipple everted without discharge. Right breast is soft, tender with palpation. Post treatment swelling and residual hyperpigmentation noted. There are no suspicious masses or lumps. Surgical incision site without signs of infection. Nipple everted without discharge.   ABDOMEN: Soft, nontender, nondistended. Active bowel sounds x4.   EXTREMITIES: Without clubbing or cyanosis. No edema noted. Full ROM bilateral upper extremities.   NEUROLOGIC EXAMINATION: Cranial nerves grossly intact.         ASSESSMENT: Marilee was diagnosed with right breast cancer in August 2020. She underwent treatment with surgery and radiation.  Since completing radiation she has started on Arimidex, which she will be on for the next 5 years. Mammogram in July showed benign findings. Continues on Arimidex and is tolerating it well. She is still experiencing right breast lymphedema more prominent distal portion of the breast. This is causing tenderness to the right breast. she is performing massage and ROM on daily basis. Will refer to PT for lymphedema management. She does not have concerning fainting on today's physical exam.     PLAN:  1. Mammogram: 7/14/21 Benign  2. Arimidex: tolerating well  3. DEXA: 11/17/20: Osteoporosis. Has tried treatment for this in the past but has not been able to tolerate it. PCP following this, she is taking vitamin D and exercising regularly. 4. Right breast lymphedema: controlled but does persists, using compression pad at night, massage on a daily basis. More prominent distally. Refer back to PT for LE evaluation. 5. Continue follow up with other provider as scheduled. 6. Follow up here in 6 weeks. 9. Shane Puente is aware she can call for questions, concerns or changes in condition. Electronically signed by WILIAN Palencia CNP on 11/24/21 at 2:06 PM EST    ATTESTATION:  I have spent 45 minutes reviewing previous notes, test results and face to face with the patient discussing the diagnosis and importance of compliance with the treatment plan as well as documenting on the day of the visit. CC: Juju Nieves; SAMANTA Rizvi

## 2021-12-09 ENCOUNTER — HOSPITAL ENCOUNTER (OUTPATIENT)
Dept: PHYSICAL THERAPY | Age: 61
Setting detail: THERAPIES SERIES
Discharge: HOME | End: 2021-12-09
Payer: COMMERCIAL

## 2021-12-09 PROCEDURE — 97140 MANUAL THERAPY 1/> REGIONS: CPT

## 2021-12-09 PROCEDURE — 97161 PT EVAL LOW COMPLEX 20 MIN: CPT

## 2021-12-09 NOTE — PROGRESS NOTES
** PLEASE SIGN, DATE AND TIME CERTIFICATION BELOW AND RETURN TO [facility] OUTPATIENT REHABILITATION (FAX #: [phone]). ATTEST/CO-SIGN IF ACCESSING VIA [employer]ette. THANK YOU.**    I certify that I have examined the patient below and determined that Physical Medicine and Rehabilitation service is necessary and that I approve the established plan of care for up to 90 days or as specifically noted. Attestation, signature or co-signature of physician indicates approval of certification requirements.    ________________________ ____________ __________  Physician Signature   Date   Time  [address] [facility]  PHYSICAL THERAPY  [x] EVALUATION    [x]  [facility]     Date: 2021  Patient Name:  Kiara Hamlin  : 1960  MRN: [id number]      Referring Practitioner HIGINIO Delgado*   Diagnosis Malignant neoplasm of upper-outer quadrant of right female breast [C50.411]    Treatment Diagnosis R breast lymphedema   Date of Evaluation 21    Additional Pertinent History thyroid disease, osteoporosis      Functional Outcome Measure Used O: QuickDASH   Functional Outcome Score 4.5% impaired (21)       Insurance: Primary: Payor: Mark Agrawal /  /  / ,   Secondary:    Authorization Information: Aquatics and modalities covered   Visit # 1, 1/10 for progress note   Visits Allowed: Unlimited   Recertification Date: 74   Physician Follow-Up: 22 Mahad Mehta 22 Deirdre Barney CNP   Physician Orders: Breast lymphedema   History of Present Illness: 20 R IDC ER/DC+, HER2-, 20 R Lumpectomy with SLNB with 0/1 node +, 20 re-excision, completed radiation on 2020, on Arimidex, completed care for R breast lymphedema from 21 - 21       SUBJECTIVE: States she started to noticed swelling in the R lateral flank in November. Admits she was in mid to late September she was visiting the [place] where there was horrible humidity.  First noted as itching in her right upper back. Noticed subtle breast changes. Increased difficulty with lifting and reaching. Notes pulling sensation at R underarm with reaching and notes pain at distal ribcage anterior and posterior which comes intermittently, such as with deep inhale/exhale and with reaching. States the ribcage pain has started in the past couple of weeks. Admits her compression bra and compression pad are painful to wear. Social/Functional History and Current Status:  Medications and Allergies have been reviewed and are listed on Medical History Questionnaire. Georgette Lind lives with spouse in a single story home with stairs and no handrail to enter.     Task Previous Current   ADLs  Independent Modified Independent   Ambulation Independent Independent   Transfers Independent Independent   Recreation Independent Independent - artist   Community Integration Independent Independent   Driving Active  Active    Work Retired  Retired     OBJECTIVE:   Posture: Good  Palpation: Moderate tenderness across R breast  Observation: No skin color changes, change in tissue texture at yifan-areolar region only. Pt in seated, note more fullness at R mid-upper back compared to left. Range of Motion/Strength (Range of Motion in degrees)    Right Left Comments   Shoulder Flexion PROM 171 175 R 175 4/8/21   Shoulder ABDuction PROM 180 180    Shoulder Strength 4+/5 4+/5    Elbow Strength 5/8 5/5      Circumferential Measurements:     Upper Extremity Circumferential Measurements     Right (cm) Left (cm) Comments   Met Heads 17.6 17.6     Ulnar Styloid Process 13.7 14     10 cm distal to Lateral Epicondyle 21.6 21     Elbow Crease 22 23.1     10 cm proximal from Lateral Epicondyle 28.9 27.5     Axilla 30.2 30     Total 134 133.2 Axilla - to - Axilla: NT                                     4/8/21:   140.9     142.3      2/18/21:                       142.8      141. 8   11/23/20:                     140. 6      140                                    11/6/20:                      142          139. 1                                    8/59/19:                      142          138. 6                                     1/06/01:                     195          670. 4                                     8/7/20:                         140. 2       138.5      Quick Dash: 4.5% impaired (0% impaired 4/8/21)    Treatment Initiated:     TREATMENT   Precautions: R UE lymphedema risk   Pain: 3/10 R Breast    X in shaded column indicates activity completed today   Modalities Parameters/  Location  Notes         Manual Therapy Time/Technique  Notes   Manual lymphatic drainage for R upper quadrant using Vodder techniques 30 minutes x Short neck, abdominal and deep abdominal sequence with anterior and posterior R to L interaxillary and A-I anastomoses used. Exercise/Intervention   Notes              Specific Interventions Next Treatment: CDT with MLD and compression, skin care, exercise and education to reduce her symptoms and provide control over lymphedema    Activity Tolerance: Patient tolerated treatment well    ASSESSMENT:  Assessment: Pt presents with fullness across R upper back, pulling at R axilla with reaching and intermittent pain at R anterior and posterior distal ribs which make lifting and reaching difficult. She would benefit from skilled PT/Oncology Rehab to address these issues and allow improved quality of life and shoulder function. Body Structures/Functions/Activity Limitations:Decreased tolerance of activities, Decreased affected limb range of motion, Difficulty reaching/carrying/pushing/pulling, Pain and Lymphedema  Prognosis: good    GOALS:  Patient Goal: Get rid of lymphedema. Short Term Goals to be met in 12 weeks:  1. See LTGs    Long Term Goals to be met in 12 weeks:   1.  Pt to improve QuickDASH score from 4.5% impaired to 0% impaired for improved shoulder function for recreational activities. 2. Pt to demo R shoulder PROM flexion improved from 171 deg to 175 deg for ease with reaching tasks. 3. Pt to demo fullness in R upper back reduced for improved comfort with wearing bra and sleeping. 4. Pt to be independent with HEP for lymphedema management to control her symptoms and reduce the risk of cellulitis. Patient Education: Plan of care, goals. See \"Treatment Initiated\" for further details. Education Outcome: Verbalized understanding  Education Barriers: None    PLAN:  Treatment Recommendations: Range of Motion, Lymphedema Management, Manual Techniques, Home Exercise Prescription and Safety Education    Plan of care initiated. Plan to see patient up to 2 times per week for up to 12 weeks to address the treatment planned outlined above, tapering as symptoms resolve.     Time In 0805   Time Out 0925   Timed Code Minutes: 30 min   Total Treatment Time: 80 min       Electronically Signed by: Toro Pardo, PT, DPT, SHANELLE, CLT [id number] 12/9/2021

## 2021-12-14 ENCOUNTER — HOSPITAL ENCOUNTER (OUTPATIENT)
Dept: PHYSICAL THERAPY | Age: 61
Setting detail: THERAPIES SERIES
Discharge: HOME | End: 2021-12-14
Payer: COMMERCIAL

## 2021-12-14 PROCEDURE — 97140 MANUAL THERAPY 1/> REGIONS: CPT

## 2021-12-14 NOTE — PROGRESS NOTES
[facility]  ONCOLOGY REHABILITATION  PHYSICAL THERAPY  [x] DAILY NOTE [] PROGRESS NOTE [] DISCHARGE NOTE    [x] [facility]     Date: 2021  Patient Name:  Pamela Carmona  : 1960  MRN: [id number]         Referring Practitioner HIGINIO Crow*   Diagnosis Malignant neoplasm of upper-outer quadrant of right female breast [C50.411]    Treatment Diagnosis R breast lymphedema   Date of Evaluation 21    Additional Pertinent History thyroid disease, osteoporosis       Functional Outcome Measure Used O: QuickDASH   Functional Outcome Score 4.5% impaired (21)        Insurance: Primary: Payor: Kings Levy 150 /  /  / ,   Secondary:    Authorization Information: Aquatics and modalities covered   Visit # 2, 2/10 for progress note   Visits Allowed: Unlimited   Recertification Date:    Physician Follow-Up: 22 Crow Su 22 Oni Palacios CNP   Physician Orders: Breast lymphedema   History of Present Illness: 20 R IDC ER/LA+, HER2-, 20 R Lumpectomy with SLNB with 0/1 node +, 20 re-excision, completed radiation on 2020, on Arimidex, completed care for R breast lymphedema from 21 - 21        SUBJECTIVE: Notes after last session, she felt great but by the end of the day, she felt more tightness again at R axilla and lateral breast. States she has always had \"pinkness\" at lateral breast and wonders if her scar is starting to pull more. Reports she tried to trim down her compression pad but found that she was still itchy and skin irritated at her axilla despite this part of the pad being trimmed away.       TREATMENT   Precautions: R UE lymphedema risk   Pain: 2-3/10 R breast    X in shaded column indicates activity completed today   Modalities Parameters/  Location  Notes         Manual Therapy Time/Technique  Notes   Manual lymphatic drainage for R upper quadrant using Vodder techniques 45 minutes x Short neck, abdominal and deep abdominal sequence with anterior and posterior R to L interaxillary and A-I anastomoses used. LymphaTouch used at 100mmHg small treatment cup 60-20Hz 2 sec pulse with lift and twist technique at lateral breast.   Exercise/Intervention   Notes                   Specific Interventions for Next Treatment:  CDT with MLD and compression, skin care, exercise and education to reduce her symptoms and provide control over lymphedema    Activity Tolerance: Patient tolerated treatment well    ASSESSMENT:  Assessment: Observed enlarged pores at lateral breast with use of LymphaTouch indicating continued edema in breast tissue. Firm tissue significantly softer by end of session. GOALS:  Patient Goal: Get rid of lymphedema.     Short Term Goals to be met in 12 weeks:  1. See LTGs     Long Term Goals to be met in 12 weeks:   1. Pt to improve QuickDASH score from 4.5% impaired to 0% impaired for improved shoulder function for recreational activities. 2. Pt to demo R shoulder PROM flexion improved from 171 deg to 175 deg for ease with reaching tasks. 3. Pt to demo fullness in R upper back reduced for improved comfort with wearing bra and sleeping. 4. Pt to be independent with HEP for lymphedema management to control her symptoms and reduce the risk of cellulitis.       Patient Education: Continue with compression bra, check into [employer] bras for improved comfort - [employer] bra discussed today.   Education Outcome: Verbalized understanding  Education Barriers: None    PLAN: Continue established plan of care      Time In 0915   Time Out 1000   Timed Code Minutes: 45 min   Total Treatment Time: 45 min       Electronically Signed by: Rahda Zamudio, PT, DPT, SHANELLE, CLT [id number] 12/14/2021

## 2021-12-16 ENCOUNTER — HOSPITAL ENCOUNTER (OUTPATIENT)
Dept: PHYSICAL THERAPY | Age: 61
Setting detail: THERAPIES SERIES
Discharge: HOME | End: 2021-12-16
Payer: COMMERCIAL

## 2021-12-16 PROCEDURE — 97140 MANUAL THERAPY 1/> REGIONS: CPT

## 2021-12-16 NOTE — PROGRESS NOTES
anastomoses used. LymphaTouch used at 90mmHg small treatment cup 60-20Hz 2 sec pulse with lift and twist technique at lateral breast.   Exercise/Intervention   Notes                   Specific Interventions for Next Treatment:  CDT with MLD and compression, skin care, exercise and education to reduce her symptoms and provide control over lymphedema    Activity Tolerance: Patient tolerated treatment well    ASSESSMENT:  Assessment: Firm tissue much softer by end of treatment at lateral breast.    GOALS:  Patient Goal: Get rid of lymphedema.     Short Term Goals to be met in 12 weeks:  1. See LTGs     Long Term Goals to be met in 12 weeks:   1. Pt to improve QuickDASH score from 4.5% impaired to 0% impaired for improved shoulder function for recreational activities. 2. Pt to demo R shoulder PROM flexion improved from 171 deg to 175 deg for ease with reaching tasks. 3. Pt to demo fullness in R upper back reduced for improved comfort with wearing bra and sleeping. 4. Pt to be independent with HEP for lymphedema management to control her symptoms and reduce the risk of cellulitis.       Patient Education: Continue with pec stretches and compression bra.   Education Outcome: Verbalized understanding  Education Barriers: None    PLAN: Continue established plan of care      Time In 1015   Time Out 1100   Timed Code Minutes: 45 min   Total Treatment Time: 45 min       Electronically Signed by: Neal Carter, PT, DPT, SHANELLE, CLT [id number] 12/16/2021

## 2021-12-21 ENCOUNTER — HOSPITAL ENCOUNTER (OUTPATIENT)
Dept: PHYSICAL THERAPY | Age: 61
Setting detail: THERAPIES SERIES
Discharge: HOME | End: 2021-12-21
Payer: COMMERCIAL

## 2021-12-21 PROCEDURE — 97140 MANUAL THERAPY 1/> REGIONS: CPT

## 2021-12-21 NOTE — PROGRESS NOTES
[facility]  ONCOLOGY REHABILITATION  PHYSICAL THERAPY  [x] DAILY NOTE [] PROGRESS NOTE [] DISCHARGE NOTE    [x] [facility]     Date: 2021  Patient Name:  Jostin Alcantara  : 1960  MRN: [id number]         Referring Practitioner HIGINIO West*   Diagnosis Malignant neoplasm of upper-outer quadrant of right female breast [C50.411]    Treatment Diagnosis R breast lymphedema   Date of Evaluation 21    Additional Pertinent History thyroid disease, osteoporosis       Functional Outcome Measure Used O: QuickDASH   Functional Outcome Score 4.5% impaired (21)        Insurance: Primary: Payor: Kings Levy 150 /  /  / ,   Secondary:    Authorization Information: Aquatics and modalities covered   Visit # 4, 4/10 for progress note   Visits Allowed: Unlimited   Recertification Date: 60   Physician Follow-Up: 22 Talbert Inch 22 Sun Muñoz CNP   Physician Orders: Breast lymphedema   History of Present Illness: 20 R IDC ER/OK+, HER2-, 20 R Lumpectomy with SLNB with 0/1 node +, 20 re-excision, completed radiation on 2020, on Arimidex, completed care for R breast lymphedema from 21 - 21        SUBJECTIVE: States tightness/pulling with shoulder flexion has improved since last session. Notes reduced R breast fullness. TREATMENT   Precautions: R UE lymphedema risk   Pain: 2/10 R breast lateral to superior-lateral breast/pec    X in shaded column indicates activity completed today   Modalities Parameters/  Location  Notes         Manual Therapy Time/Technique  Notes   Manual lymphatic drainage for R upper quadrant using Vodder techniques 40 minutes x Short neck, abdominal and deep abdominal sequence with anterior and posterior R to L interaxillary and A-I anastomoses used.  LymphaTouch used at 90-85 mmHg small treatment cup 60-20Hz 2 sec pulse with lift and twist technique at lateral breast.   Exercise/Intervention   Notes Specific Interventions for Next Treatment:  CDT with MLD and compression, skin care, exercise and education to reduce her symptoms and provide control over lymphedema    Activity Tolerance: Patient tolerated treatment well    ASSESSMENT:  Assessment: Continued improvement in breast edema and tightness. GOALS:  Patient Goal: Get rid of lymphedema.     Short Term Goals to be met in 12 weeks:  1. See LTGs     Long Term Goals to be met in 12 weeks:   1. Pt to improve QuickDASH score from 4.5% impaired to 0% impaired for improved shoulder function for recreational activities. 2. Pt to demo R shoulder PROM flexion improved from 171 deg to 175 deg for ease with reaching tasks. 3. Pt to demo fullness in R upper back reduced for improved comfort with wearing bra and sleeping. 4. Pt to be independent with HEP for lymphedema management to control her symptoms and reduce the risk of cellulitis.       Patient Education: Reviewed lymphedema risk reduction strategies with upcoming move - encouraged to wear compression bra, compression sleeve and avoid heavy lifting and repetitive reaching due to current issue.   Education Outcome: Verbalized understanding  Education Barriers: None    PLAN: Continue established plan of care      Time In 1020   Time Out 1100   Timed Code Minutes: 40 min   Total Treatment Time: 40 min       Electronically Signed by: Donavon Wong, PT, DPT, SHANELLE, CLT [id number] 12/21/2021

## 2022-01-03 ENCOUNTER — HOSPITAL ENCOUNTER (OUTPATIENT)
Dept: PHYSICAL THERAPY | Age: 62
Setting detail: THERAPIES SERIES
Discharge: HOME | End: 2022-01-03
Payer: COMMERCIAL

## 2022-01-03 PROCEDURE — 97140 MANUAL THERAPY 1/> REGIONS: CPT

## 2022-01-03 NOTE — PROGRESS NOTES
[address] [facility]  ONCOLOGY REHABILITATION  PHYSICAL THERAPY  [x] DAILY NOTE [] PROGRESS NOTE [] DISCHARGE NOTE    [x] [facility]     Date: 1/3/2022  Patient Name:  Madison Coon  : 1960  MRN: [id number]         Referring Practitioner HIGINIO Concepcion*   Diagnosis Malignant neoplasm of upper-outer quadrant of right female breast [C50.411]    Treatment Diagnosis R breast lymphedema   Date of Evaluation 21    Additional Pertinent History thyroid disease, osteoporosis       Functional Outcome Measure Used O: QuickDASH   Functional Outcome Score 4.5% impaired (21)        Insurance: Primary: Payor: Christina Vergara /  /  / ,   Secondary:    Authorization Information: Aquatics and modalities covered   Visit # 5, 5/10 for progress note   Visits Allowed: Unlimited   Recertification Date: 78   Physician Follow-Up: 22 Charisma Hatch 22 Kashmir Armendariz CNP   Physician Orders: Breast lymphedema   History of Present Illness: 20 R IDC ER/OK+, HER2-, 20 R Lumpectomy with SLNB with 0/1 node +, 20 re-excision, completed radiation on 2020, on Arimidex, completed care for R breast lymphedema from 21 - 21        SUBJECTIVE: Reports less tenderness and more localized to superior lateral breast. She thinks it is due to her incisional scar tissue and thinks her stretches are very important to stay ahead of this tissue. Significant decrease in edema in breast and at lateral flank. Also admits she has noticed that not taking hot baths may have helped in reducing her swelling but states she misses them.       TREATMENT   Precautions: R UE lymphedema risk   Pain: 1/10 R breast lateral to superior-lateral breast/pec    X in shaded column indicates activity completed today   Modalities Parameters/  Location  Notes         Manual Therapy Time/Technique  Notes   Myofascial techniques to scar tissue at incisional area 40 minutes x  LymphaTouch used at 80 mmHg extra small treatment cup 60Hz 2 sec pulse with lift and twist technique at lateral breast however pt reported too tender and returned to manual techniques to break scar tissue   Exercise/Intervention   Notes                   Specific Interventions for Next Treatment:  CDT with MLD and compression, skin care, exercise and education to reduce her symptoms and provide control over lymphedema    Activity Tolerance: Patient tolerated treatment well    ASSESSMENT:  Assessment: Pt with significant reduction in breast and flank edema with remaining issue being scar tissue at [facility] site which pt feels causes her tightness at her axilla. GOALS:  Patient Goal: Get rid of lymphedema.     Short Term Goals to be met in 12 weeks:  1. See LTGs     Long Term Goals to be met in 12 weeks:   1. Pt to improve QuickDASH score from 4.5% impaired to 0% impaired for improved shoulder function for recreational activities. 2. Pt to demo R shoulder PROM flexion improved from 171 deg to 175 deg for ease with reaching tasks. 3. Pt to demo fullness in R upper back reduced for improved comfort with wearing bra and sleeping. 4. Pt to be independent with HEP for lymphedema management to control her symptoms and reduce the risk of cellulitis.       Patient Education: Educated on factors to alter and what to do after (stretch and MLD) to return to her recreational activity of baths. Educated on how to progress strengthening program and how to monitor for issues. To continue with stretches and MLD and contact clinic immediately if symptoms worsen. Education Outcome: Verbalized understanding  Education Barriers: None    PLAN: Continue established plan of care with a recheck in 1 month to determine ability to maintain results and manage condition independently.       Time In 1020   Time Out 1100   Timed Code Minutes: 40 min   Total Treatment Time: 40 min       Electronically Signed by: Sony Chauhan, PT, DPT, SHANELLE, CLT [id number] 1/3/2022

## 2022-01-25 ENCOUNTER — HOSPITAL ENCOUNTER (OUTPATIENT)
Dept: INFUSION THERAPY | Age: 62
Discharge: HOME | End: 2022-01-25
Payer: COMMERCIAL

## 2022-01-25 ENCOUNTER — OFFICE VISIT (OUTPATIENT)
Dept: ONCOLOGY | Age: 62
End: 2022-01-25
Payer: COMMERCIAL

## 2022-01-25 VITALS
HEART RATE: 78 BPM | BODY MASS INDEX: 24.58 KG/M2 | DIASTOLIC BLOOD PRESSURE: 56 MMHG | WEIGHT: 130.2 LBS | TEMPERATURE: 98.2 F | HEIGHT: 61 IN | OXYGEN SATURATION: 98 % | RESPIRATION RATE: 16 BRPM | SYSTOLIC BLOOD PRESSURE: 123 MMHG

## 2022-01-25 DIAGNOSIS — E04.1 THYROID NODULE: Primary | ICD-10-CM

## 2022-01-25 DIAGNOSIS — Z17.0 MALIGNANT NEOPLASM OF BREAST IN FEMALE, ESTROGEN RECEPTOR POSITIVE, UNSPECIFIED LATERALITY, UNSPECIFIED SITE OF BREAST (HCC): ICD-10-CM

## 2022-01-25 DIAGNOSIS — M81.0 AGE RELATED OSTEOPOROSIS, UNSPECIFIED PATHOLOGICAL FRACTURE PRESENCE: ICD-10-CM

## 2022-01-25 DIAGNOSIS — E04.9 ENLARGED THYROID: ICD-10-CM

## 2022-01-25 DIAGNOSIS — Z85.3 ENCOUNTER FOR FOLLOW-UP SURVEILLANCE OF BREAST CANCER: ICD-10-CM

## 2022-01-25 DIAGNOSIS — Z79.811 PROPHYLACTIC USE OF ANASTROZOLE (ARIMIDEX): ICD-10-CM

## 2022-01-25 DIAGNOSIS — C50.919 MALIGNANT NEOPLASM OF BREAST IN FEMALE, ESTROGEN RECEPTOR POSITIVE, UNSPECIFIED LATERALITY, UNSPECIFIED SITE OF BREAST (HCC): ICD-10-CM

## 2022-01-25 DIAGNOSIS — Z98.890 STATUS POST RIGHT BREAST LUMPECTOMY: ICD-10-CM

## 2022-01-25 DIAGNOSIS — Z08 ENCOUNTER FOR FOLLOW-UP SURVEILLANCE OF BREAST CANCER: ICD-10-CM

## 2022-01-25 PROCEDURE — 99214 OFFICE O/P EST MOD 30 MIN: CPT | Performed by: INTERNAL MEDICINE

## 2022-01-25 PROCEDURE — 99211 OFF/OP EST MAY X REQ PHY/QHP: CPT

## 2022-01-25 ASSESSMENT — ENCOUNTER SYMPTOMS
SORE THROAT: 0
VOMITING: 0
CONSTIPATION: 0
DIARRHEA: 0
EYE DISCHARGE: 0
FACIAL SWELLING: 0
COLOR CHANGE: 0
BLOOD IN STOOL: 0
COUGH: 0
CHEST TIGHTNESS: 0
TROUBLE SWALLOWING: 0
SHORTNESS OF BREATH: 0
WHEEZING: 0
RECTAL PAIN: 0
ABDOMINAL PAIN: 0
ABDOMINAL DISTENTION: 0
BACK PAIN: 0
NAUSEA: 0

## 2022-01-25 NOTE — PROGRESS NOTES
[facility] [address] [facility] [address], [address] [facility] [address]  [facility]  Dept: [phone]  Dept Fax: [phone]: [phone]    Visit Date:1/25/2022     Taylor Cutler is a 59 y.o. female who presents today for:   Chief Complaint   Patient presents with    Follow-up     Malignant neoplasm of breast in female, estrogen receptor positive, unspecified laterality, unspecified site of breast [facility])        HPI:   Mrs. Magda Alex is a 57-year-old postmenopausal patients with h/o stage Ia right breast cancer. She presents to the medical oncology clinic to discuss postsurgical treatment. He had an annual screening mammogram on July 31, 2020 that showed 0.9 cm round high density spiculated mass in the right breast.  She underwent core biopsy on August 4, 2020 that showed low grade invasive ductal cancer of the right breast.  Tumor cells were estrogen receptor  Positive, progesterone receptor positive. HER-2/deysi receptors stain 2+ by IHC. FISH was negative for HER-2 amplification. Prior to the biopsy she complained of breast pain in the right breast and denied galactorrhea. Subsequently she met with the surgeon Dr. Lala Pathak and after discussing her surgical treatment options she decided to proceed with lumpectomy and sentinel lymph node mapping and biopsy. Pathology report from the surgery on August 18, 2020 showed:  A. Right breast, lumpectomy:    Invasive ductal adenocarcinoma, Donahue grade 1.    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    Skin (anterior) margin focally positive for invasive carcinoma.    DCIS 1mm from skin (anterior) margin.    Pathologic Stage: pT1b, pN0(sn). B. [place] lymph node, right, excision:    Negative for malignancy (0/1). Risk assessment: none. She has not been on previous estrogen therapy. Felt something in right axillary area. Due to positive inferior margin on August 27, 2020 she underwent reexcision.   Pathology report showed:  Breast, right, additional margin:    Biopsy site changes.    Microcalcifications within benign breast tissue.    No evidence of residual malignancy. Presents the medical oncology clinic to discuss postsurgical treatment. Ms. Patel July maternal grandmother was diagnosed with pancreatic cancer in her [de-identified]. A maternal uncle was also diagnosed with pancreatic cancer. Otherwise, there are no known cancers in her maternal extended family. Ms. Leonel Miller elected to decline genetic testing today. She would like additional time to consider her options and discuss the testing with her family members before making a final decision. The patient completed her radiation treatment. She received 5256 cGy in 20 fractions. In November 2020 she started adjuvant hormonal therapy with AI    Interim history on 01/25/2022:  Patient presents to the medical oncology clinic for 6 months follow up visit while receiving adjuvant hormonal therapy with Arimidex. She reports that overall she tolerates Arimidex well. She denies having significant arthralgia, minimal hot flashes. The patient had annual mammogram in July 2021 that showed benign findings. The patient denies any hospitalizations since last visit with me. She still reports some fullness and discomfort in her right breast.  Also concerned about enlarging thyroid gland. Otherwise she denies any complaints  HPI   Past Medical History:   Diagnosis Date    Acquired autoimmune hypothyroidism     Breast cancer ([facility] [address] 08/2020    Diverticulitis     Dr. Marcus Mena History of kidney stones     History of therapeutic radiation     oct.  2020    Intestinal metaplasia of gastric mucosa         Osteopenia 2006      Past Surgical History:   Procedure Laterality Date    BREAST LUMPECTOMY Right 8/18/2020    RIGHT BREAST LUMPECTOMY AND SENTINEL LYMPH NODE BIOPSY performed by Sunita Boone MD at [facility] LUMPECTOMY Right 8/27/2020    RE-EXCISION ANTERIOR MARGIN RIGHT BREAST performed by José Luis Natarajan MD at [facility] 49  [phone]    x2    COLONOSCOPY  2018    Dr. Sampson Miller Left 2017    With left ureteroscopy, basket retrieval of stone fragments, left ureterenoscopy, basket retrieval of renal stones, and left ureter stent placement-- [address]  [id number]    pelvic    TAMIKO US GUID NDL BIOPSY RIGHT  2020    TAMIKO [address] BIOPSY RIGHT 2020 [address] BIOPSY OF BREAST, NEEDLE CORE Right 2020    [facility]    UPPER GASTROINTESTINAL ENDOSCOPY      US GUIDED NEEDLE LOC OF RIGHT BREAST  2020    US GUIDED NEEDLE LOC OF RIGHT BREAST 2020 [facility]      Family History   Problem Relation Age of Onset    Other Mother         fuches corneal dystrophy, scleroderma, osteoporosis    High Blood Pressure Mother         pulmonary htn    High Blood Pressure Father     Cancer Father         lung    Diabetes Maternal Grandmother     Cancer Maternal Grandmother         pancreatic    Other Maternal Grandfather         fuches corneal dystrophy    Other Sister         fuches corneal dystrophy    Other Sister         fuches corneal dystrophy    Heart Disease Sister         pacemaker    Other Sister         fuches corneal dystrophy, hypothyroidism    Other Sister         hypothyroidism, reynauds      Social History     Tobacco Use    Smoking status: Former Smoker     Packs/day: 1.00     Years: 20.00     Pack years: 20.00     Types: Cigarettes     Quit date: 1997     Years since quittin.1    Smokeless tobacco: Never Used   Substance Use Topics    Alcohol use: No      Current Outpatient Medications   Medication Sig Dispense Refill    anastrozole (ARIMIDEX) 1 MG tablet Take 1 tablet by mouth daily 30 tablet 11    Cholecalciferol 400 UNIT TABS tablet Take 400 Units by mouth daily      thyroid (ARMOUR THYROID) 15 MG tablet Take 15 mg by mouth daily Alternate every other day with 30 mg       No current facility-administered medications for this visit. Allergies   Allergen Reactions    Acetaminophen      Gi upset    Aspartame And Phenylalanine     Bextra [Valdecoxib]      urinary retention    Doxycycline      GI    Magnesium Citrate      Internal  Pain, nausea, vomiting, diarrhea    Ciprofloxacin Palpitations    Flagyl [Metronidazole] Palpitations    Neomycin Rash      Health Maintenance   Topic Date Due    Hepatitis C screen  Never done    Pneumococcal 0-64 years Vaccine (1 of 4 - PCV13) Never done    Depression Screen  Never done    HIV screen  Never done    DTaP/Tdap/Td vaccine (1 - Tdap) Never done    Cervical cancer screen  Never done    Lipid screen  Never done    Colon cancer screen colonoscopy  Never done    Shingles Vaccine (1 of 2) Never done    Flu vaccine (1) Never done    COVID-19 Vaccine (4 - Booster for Pfizer series) 04/10/2022    Breast cancer screen  07/14/2022    Hepatitis A vaccine  Aged Out    Hepatitis B vaccine  Aged Out    Hib vaccine  Aged Out    Meningococcal (ACWY) vaccine  Aged Out        Subjective:   Review of Systems   Constitutional: Negative for activity change, appetite change, fatigue and fever. HENT: Negative for congestion, dental problem, facial swelling, hearing loss, mouth sores, nosebleeds, sore throat, tinnitus and trouble swallowing. Eyes: Negative for discharge and visual disturbance. Respiratory: Negative for cough, chest tightness, shortness of breath and wheezing. Cardiovascular: Negative for chest pain, palpitations and leg swelling. Gastrointestinal: Negative for abdominal distention, abdominal pain, blood in stool, constipation, diarrhea, nausea, rectal pain and vomiting. Endocrine: Negative for cold intolerance, polydipsia and polyuria. Genitourinary: Negative for decreased urine volume, difficulty urinating, dysuria, flank pain, hematuria and urgency.    Musculoskeletal: Negative for arthralgias, back pain, gait problem, joint swelling, myalgias and neck stiffness. Skin: Negative for color change, rash and wound. Neurological: Negative for dizziness, tremors, seizures, speech difficulty, weakness, light-headedness, numbness and headaches. Hematological: Negative for adenopathy. Does not bruise/bleed easily. Psychiatric/Behavioral: Negative for confusion and sleep disturbance. The patient is not nervous/anxious. Objective:   Physical Exam  Vitals reviewed. Constitutional:       General: She is not in acute distress. Appearance: She is well-developed. HENT:      Head: Normocephalic. Mouth/Throat:      Pharynx: No oropharyngeal exudate. Eyes:      General: No scleral icterus. Right eye: No discharge. Left eye: No discharge. Pupils: Pupils are equal, round, and reactive to light. Neck:      Thyroid: No thyromegaly. Vascular: No JVD. Trachea: No tracheal deviation. Cardiovascular:      Rate and Rhythm: Normal rate. Heart sounds: Normal heart sounds. No murmur heard. No friction rub. No gallop. Pulmonary:      Effort: Pulmonary effort is normal. No respiratory distress. Breath sounds: Normal breath sounds. No stridor. No wheezing or rales. Chest:      Chest wall: No tenderness. Breasts:      Right: Skin change (Lumpectomy incision is nicely healed) present. Abdominal:      General: Bowel sounds are normal. There is no distension. Palpations: Abdomen is soft. There is no mass. Tenderness: There is no abdominal tenderness. There is no rebound. Musculoskeletal:         General: Normal range of motion. Cervical back: Normal range of motion and neck supple. Comments: Good range of motion in all four extremities. Lymphadenopathy:      Cervical: No cervical adenopathy. Skin:     General: Skin is warm. Findings: No erythema or rash.    Neurological:      Mental Status: She is alert and oriented to person, place, and time. Cranial Nerves: No cranial nerve deficit. Motor: No abnormal muscle tone. Deep Tendon Reflexes: Reflexes are normal and symmetric. Psychiatric:         Behavior: Behavior normal.         Thought Content: Thought content normal.         Judgment: Judgment normal.         BP (!) 123/56 (Site: Left Upper Arm, Position: Sitting, Cuff Size: Medium Adult)   Pulse 78   Temp 98.2 °F (36.8 °C) (Oral)   Resp 16   Ht 5' 1\" (1.549 m)   Wt 130 lb 3.2 oz (59.1 kg)   SpO2 98%   BMI 24.60 kg/m²      ECOG status is 0    Imaging studies and labs:     Lab Results   Component Value Date    WBC 8.3 07/27/2018    HGB 14.3 08/12/2020    HCT 43.9 08/12/2020    MCV 94.1 07/27/2018     07/27/2018       Chemistry        Component Value Date/Time     07/27/2018 1629    K 4.3 07/27/2018 1629    CL 98 07/27/2018 1629    CO2 25 07/27/2018 1629    BUN 15 07/27/2018 1629    CREATININE 0.6 07/27/2018 1629        Component Value Date/Time    CALCIUM 10.0 07/27/2018 1629    BILITOT NEGATIVE 06/01/2017 1006        Mammogram on July 14, 2021 showed:  No mammographic evidence of malignancy. A 1 year screening mammogram is recommended. DEXA study on November 17, 2020 showed:  IMPRESSION: OSTEOPOROSIS    Assessment/Plan:   1. Stage IA (cT1b, cN0, cM0, G1, ER+, ME+, HER2-) right breast cancer. The patient is diagnosed with stage I A hormone responsive right breast cancer. She is status post right lumpectomy with axillary LN biopsy. She presents to the [address] [place] [address] to discuss further management. The patient was informed that she has an early stage of breast cancer. Since her tumor was larger than 5 mm in size and she is younger than 79, my recommendation was to proceed with breast Oncotype DX assay.   The Oncotype Dx 21-gene recurrence score (RS) is the best-validated prognostic and predictive assay to identify patients who are most and least likely to derive benefit from adjuvant chemotherapy. RS 25 or below identifies women with low risk of disease recurrence for whom chemotherapy is unlikely to provide a benefit. Her recurrence score came back as 16, placing her at low risk group. Her risk of distant disease recurrence during the next 9 years is 4% with adjuvant hormonal therapy. Patient completed her radiation treatment to the reminder of her right breast in November 2020. She received total cumulative dose of 5256 cGy in 20 fractions. 2.  Adjuvant hormonal therapy. She started adjuvant hormonal therapy with Arimidex in November 2020. Overall she tolerates it well, minimal hot flashes,  minimal arthralgia. The patient had annual mammogram last week it showed benign findings. She reports some fullness and discomfort in the right breast.  No evidence of disease recurrence on today's physical examination. The patient was instructed to continue Arimidex. 3.  Osteoporosis. The patient had a DEXA study in November 2020 that showed osteoporosis I had a lengthy discussion with the patient about the benefits of bone strengthening medications. The patient is concerned about side effects and she refused Prolia or other bisphonates. 4.  Enlarged thyroid. Order is placed for ultrasound of the thyroid gland  5. Cancer surveillance. Management of breast cancer survivors who have completed active treatment and have no evidence of disease are cancer surveillance, lifestyle modifications including pursuing healthy regular exercise program, minimizing alcohol intake, and refraining from smoking. Survivor follow-up will include updated history, regular physical examination. Radiologic imaging to screen for distant recurrence will be not performed unless the patient will develop new symptoms. Symptoms suspicious for recurrent /metastic disease include:  ?Constitutional symptoms - Anorexia, weight loss, malaise, fatigue, insomnia. ? Bone pain  ? Pulmonary symptoms - persistent cough or dyspnea (at rest or with exertion). ?Neurologic symptoms - Headache, nausea, vomiting, confusion, weakness, numbness or tingling. ?Gastrointestinal symptoms - Right upper quadrant pain, change in bowel habits, presence of bloody or tarry stools. Return in about 6 months (around 7/25/2022). Orders Placed:   Orders Placed This Encounter   Procedures    US THYROID     This procedure can be scheduled via [employer] ([employer]). Access your [employer] account by visiting [email or web address]. Standing Status:   Future     Standing Expiration Date:   1/25/2023        Medications Prescribed:   No orders of the defined types were placed in this encounter.

## 2022-01-31 ENCOUNTER — HOSPITAL ENCOUNTER (OUTPATIENT)
Dept: PHYSICAL THERAPY | Age: 62
Setting detail: THERAPIES SERIES
Discharge: HOME | End: 2022-01-31
Payer: COMMERCIAL

## 2022-01-31 PROCEDURE — 97140 MANUAL THERAPY 1/> REGIONS: CPT

## 2022-01-31 NOTE — PROGRESS NOTES
[address] [facility]  ONCOLOGY REHABILITATION  PHYSICAL THERAPY  [x] DAILY NOTE [] PROGRESS NOTE [] DISCHARGE NOTE    [x] [facility]     Date: 2022  Patient Name:  Jocelynn Galeas  : 1960  MRN: [id number]         Referring Practitioner HIGINIO Ye*   Diagnosis Malignant neoplasm of upper-outer quadrant of right female breast [C50.411]    Treatment Diagnosis R breast lymphedema   Date of Evaluation 21    Additional Pertinent History thyroid disease, osteoporosis       Functional Outcome Measure Used O: QuickDASH   Functional Outcome Score 4.5% impaired (21)        Insurance: Primary: Payor: [address] /  /  / ,   Secondary:    Authorization Information: Aquatics and modalities covered   Visit # 6, 6/10 for progress note   Visits Allowed: Unlimited   Recertification Date: 70   Physician Follow-Up: 22 Inder Mcnulty CNP   Physician Orders: Breast lymphedema   History of Present Illness: 20 R IDC ER/NM+, HER2-, 20 R Lumpectomy with SLNB with 0/1 node +, 20 re-excision, completed radiation on 2020, on Arimidex, completed care for R breast lymphedema from 21 - 21        SUBJECTIVE: Admits she was doing well with improving symptoms until after her follow up with the Medical Oncologist on 22. Admits she has been more tender and swollen since then however this is starting to improve.  Complains of discomfort, not pain, at lateral breast.      TREATMENT   Precautions: R UE lymphedema risk   Pain: 4/10 R lateral breast \"discomfort\"    X in shaded column indicates activity completed today   Modalities Parameters/  Location  Notes         Manual Therapy Time/Technique  Notes   Myofascial techniques to scar tissue at incisional area 40 minutes x  LymphaTouch used at 80 mmHg extra small treatment cup 60Hz 2 sec pulse with lift and twist technique at lateral breast    Exercise/Intervention   Notes                   Specific Interventions for Next Treatment:  CDT with MLD and compression, skin care, exercise and education to reduce her symptoms and provide control over lymphedema    Activity Tolerance: Patient tolerated treatment well    ASSESSMENT:  Assessment: Pt's QuickDASH score this date remains 4.5% due to discomfort and its resulting difficulty with daily tasks. Note softening of firm tissue at lateral breast after LymphaTouch. Plan to continue with LymphaTouch vs manual techniques due to pt's high level of pain and tenderness with palpation. GOALS:  Patient Goal: Get rid of lymphedema.     Short Term Goals to be met in 12 weeks:  1. See LTGs     Long Term Goals to be met in 12 weeks:   1. Pt to improve QuickDASH score from 4.5% impaired to 0% impaired for improved shoulder function for recreational activities. 2. Pt to demo R shoulder PROM flexion improved from 171 deg to 175 deg for ease with reaching tasks. 3. Pt to demo fullness in R upper back reduced for improved comfort with wearing bra and sleeping. 4. Pt to be independent with HEP for lymphedema management to control her symptoms and reduce the risk of cellulitis.       Patient Education: To continue with LymphaTouch for firm tissue softening at lateral breast for improved overall breast tenderness and edema.   Education Outcome: Verbalized understanding  Education Barriers: None    PLAN: Continue established plan of care       Time In 1020   Time Out 1100   Timed Code Minutes: 40 min   Total Treatment Time: 40 min       Electronically Signed by: Teresa Brown, PT, DPT, SHANELLE, CLT [id number] 1/31/2022

## 2022-02-07 ENCOUNTER — HOSPITAL ENCOUNTER (OUTPATIENT)
Dept: PHYSICAL THERAPY | Age: 62
Setting detail: THERAPIES SERIES
Discharge: HOME | End: 2022-02-07
Payer: COMMERCIAL

## 2022-02-07 PROCEDURE — 97140 MANUAL THERAPY 1/> REGIONS: CPT

## 2022-02-07 NOTE — PROGRESS NOTES
reduce her symptoms and provide control over lymphedema    Activity Tolerance: Patient tolerated treatment well    ASSESSMENT:  Assessment: Pt admits tenderness throughout session and will likely be sore the remainder of the day. Note enlarged pores present initially during treatment but by the end, this had abolished indicating reduced edema. GOALS:  Patient Goal: Get rid of lymphedema.     Short Term Goals to be met in 12 weeks:  1. See LTGs     Long Term Goals to be met in 12 weeks:   1. Pt to improve QuickDASH score from 4.5% impaired to 0% impaired for improved shoulder function for recreational activities. 2. Pt to demo R shoulder PROM flexion improved from 171 deg to 175 deg for ease with reaching tasks. 3. Pt to demo fullness in R upper back reduced for improved comfort with wearing bra and sleeping. 4. Pt to be independent with HEP for lymphedema management to control her symptoms and reduce the risk of cellulitis.       Patient Education: Continue with compression day and night. Stop using chip bag if causes more tenderness.   Education Outcome: Verbalized understanding  Education Barriers: None    PLAN: Continue established plan of care       Time In 0945   Time Out 1025   Timed Code Minutes: 40 min   Total Treatment Time: 40 min       Electronically Signed by: Bernadette Burr, PT, DPT, SHANELLE, CLT [id number] 2/7/2022

## 2022-02-10 ENCOUNTER — APPOINTMENT (OUTPATIENT)
Dept: PHYSICAL THERAPY | Age: 62
End: 2022-02-10
Payer: COMMERCIAL

## 2022-02-15 ENCOUNTER — HOSPITAL ENCOUNTER (OUTPATIENT)
Dept: PHYSICAL THERAPY | Age: 62
Setting detail: THERAPIES SERIES
Discharge: HOME | End: 2022-02-15
Payer: COMMERCIAL

## 2022-02-15 PROCEDURE — 97110 THERAPEUTIC EXERCISES: CPT

## 2022-02-15 PROCEDURE — 97535 SELF CARE MNGMENT TRAINING: CPT

## 2022-02-15 PROCEDURE — 97140 MANUAL THERAPY 1/> REGIONS: CPT

## 2022-02-15 NOTE — PROGRESS NOTES
** PLEASE SIGN, DATE AND TIME CERTIFICATION BELOW AND RETURN TO [facility] OUTPATIENT REHABILITATION (FAX #: [phone]). ATTEST/CO-SIGN IF ACCESSING VIA [employer]. THANK YOU.**    I certify that I have examined the patient below and determined that Physical Medicine and Rehabilitation service is necessary and that I approve the established plan of care for up to 90 days or as specifically noted. Attestation, signature or co-signature of physician indicates approval of certification requirements.    ________________________ ____________ __________  Physician Signature   Date   Time      [address] [facility]  PHYSICAL THERAPY  [] DAILY NOTE [x] PROGRESS NOTE [] DISCHARGE NOTE    [x] [facility]     Date: 2/15/2022  Patient Name:  Dorene Amos  : 1960  MRN: [id number]         Referring Practitioner HIGINIO Conrad*   Diagnosis Malignant neoplasm of upper-outer quadrant of right female breast [C50.411]    Treatment Diagnosis R breast lymphedema   Date of Evaluation 21    Additional Pertinent History thyroid disease, osteoporosis       Functional Outcome Measure Used O: QuickDASH   Functional Outcome Score 4.5% impaired (21)        Insurance: Primary: Payor: Cora Villafuerte /  /  / ,   Secondary:    Authorization Information: Aquatics and modalities covered   Visit # 8, 0/10 for progress note   Visits Allowed: Unlimited   Recertification Date: 88   Physician Follow-Up: 22 Kenneth Noe CNP   Physician Orders: Breast lymphedema   History of Present Illness: 20 R IDC ER/FL+, HER2-, 20 R Lumpectomy with SLNB with 0/1 node +, 20 re-excision, completed radiation on 2020, on Arimidex, completed care for R breast lymphedema from 21 - 21        SUBJECTIVE: Pt states has been able to maintain her status. Notes the area just lateral to her nipple is \"always inflamed, tender, and firm. \" She reports that she can discern a pinkness to the skin but it is not always visible to others. Pt plans on moving to a new home on 3/21/22 and is quite concerned about her body's reaction to this level of activity. TREATMENT   Precautions: R UE lymphedema risk   Pain: 4/10 R lateral breast \"discomfort\"    X in shaded column indicates activity completed today   Modalities Parameters/  Location  Notes         Manual Therapy Time/Technique  Notes   Myofascial techniques to scar tissue at incisional area with manual lymphatic drainage to R breast 25 minutes x Reduced tissue firmness by end of session. Exercise/Intervention   Notes   Bicep, tricep, horizontal shoulder abduction 10  x Orange t-band   Shoulder extension, retractions, punches 10  x Orange t-band     Specific Interventions for Next Treatment:  CDT with MLD and compression, skin care, exercise and education to reduce her symptoms and provide control over lymphedema    Activity Tolerance: Patient tolerated treatment well    ASSESSMENT:  Assessment: Pt continues to struggle with persistence of symptoms despite compliance with HEP. Discussed lymphedema treatment and goal of finding techniques to manage and sustain her optimal level but to understand there will be fluctuations in her status based on a variety of variables. Pt does have continued edema at lateral breast with small, crescent-shaped firm edema with orange peel texture just lateral to her nipple but this responds favorably with manual techniques. Pt has been educated to CDT principles of skin care, compression, manual lymphatic drainage and exercise (stretches and gentle strengthening) with progression of strengthening performed today. Recommend recheck in 6-weeks to determine how pt tolerated increased activity with moving into a new home. GOALS:  Patient Goal: Get rid of lymphedema - GOAL ONGOING.     Short Term Goals to be met in 12 weeks:  1. See LTGs     Long Term Goals to be met in 12 weeks:   1.  Pt to

## 2022-03-30 ENCOUNTER — HOSPITAL ENCOUNTER (OUTPATIENT)
Dept: ULTRASOUND IMAGING | Age: 62
Discharge: HOME | End: 2022-03-30
Payer: COMMERCIAL

## 2022-03-30 DIAGNOSIS — E04.1 THYROID NODULE: ICD-10-CM

## 2022-03-30 PROCEDURE — 76536 US EXAM OF HEAD AND NECK: CPT

## 2022-04-01 ENCOUNTER — HOSPITAL ENCOUNTER (OUTPATIENT)
Age: 62
Discharge: HOME | End: 2022-04-01
Payer: COMMERCIAL

## 2022-04-01 ENCOUNTER — HOSPITAL ENCOUNTER (OUTPATIENT)
Dept: GENERAL RADIOLOGY | Age: 62
Discharge: HOME | End: 2022-04-01
Payer: COMMERCIAL

## 2022-04-01 DIAGNOSIS — R06.00 DYSPNEA, UNSPECIFIED TYPE: ICD-10-CM

## 2022-04-01 PROCEDURE — 71046 X-RAY EXAM CHEST 2 VIEWS: CPT

## 2022-04-22 ENCOUNTER — HOSPITAL ENCOUNTER (OUTPATIENT)
Dept: PHYSICAL THERAPY | Age: 62
Setting detail: THERAPIES SERIES
Discharge: HOME | End: 2022-04-22
Payer: COMMERCIAL

## 2022-04-22 PROCEDURE — 97140 MANUAL THERAPY 1/> REGIONS: CPT

## 2022-04-22 PROCEDURE — 97164 PT RE-EVAL EST PLAN CARE: CPT

## 2022-04-22 PROCEDURE — 97530 THERAPEUTIC ACTIVITIES: CPT

## 2022-04-22 NOTE — PROGRESS NOTES
** PLEASE SIGN, DATE AND TIME CERTIFICATION BELOW AND RETURN TO [facility] OUTPATIENT REHABILITATION (FAX #: [phone]). ATTEST/CO-SIGN IF ACCESSING VIA INREPP. THANK YOU.**    I certify that I have examined the patient below and determined that Physical Medicine and Rehabilitation service is necessary and that I approve the established plan of care for up to 90 days or as specifically noted.   Attestation, signature or co-signature of physician indicates approval of certification requirements.    ________________________ ____________ __________  Physician Signature   Date   Time    [facility]  PHYSICAL THERAPY  [x] DAILY NOTE [x] PROGRESS NOTE [] DISCHARGE NOTE    [x]  [facility]     Date: 2022  Patient Name:  Ed Salas  : 1960  MRN: [id number]       Referring Practitioner Clarisa Dukes CNP   Diagnosis Malignant neoplasm of upper-outer quadrant of right female breast [C50.411]    Treatment Diagnosis R breast lymphedema   Date of Evaluation 21    Additional Pertinent History thyroid disease, osteoporosis       Functional Outcome Measure Used O: QuickDASH   Functional Outcome Score 4.5% impaired (21)        Insurance: Primary: Payor: [facility] /  /  / ,   Secondary:    Authorization Information: Aquatics and modalities covered   Visit # 9, 1/10 for progress note   Visits Allowed: Unlimited   Recertification Date:  (8 weeks)   Physician Follow-Up: 22 Gina Nguyen CNP   Physician Orders: Breast lymphedema   History of Present Illness: 20 R IDC ER/AL+, HER2-, 20 R Lumpectomy with SLNB with 0/1 node +, 20 re-excision, completed radiation on 2020, on Arimidex, completed care for R breast lymphedema from 21 - 21      SUBJECTIVE: Pt is a pleasant 59year old year old female who has undergone 7 Oncology Rehabiliation/Lymphedema treatments due to tightness and pain in the right upper extremity and truncal region. She reported that she has been working on her [employer] and has noticed that the orange theraband causes increased pain in her right shoulder and therefore she has discontinued using the theraband. She reported that the Lumpectomy pressure pad, which she use been using at night increases increases \"soreness\" in trunk when she wakes in the morning. She has striation/lines on the inferior aspect of her right  Breast which is more noticeable at the end of the day [facility] breast feels more heavy\" per patient). She intermittently feels intermittent radiating symptoms to the right posterior trunk and down the right arm. She tolerates wearing her compression sleeve during the day, especially when she is completing increased activity. She denied increased swelling in her right hand. OBJECTIVE:  Circumferential Measurements:   Left Right (Baseline)  (21) Right   (22)   Metacarpal Heads  17.6 cm 17.6 cm (+) 17.8 cm   Ulnar Styloid Process 14.0 cm 13.7 cm (+) 14.7 cm   10 cm Distal to Lateral  Epicondyle 21.0 cm 21.6 cm (+) 22.1 cm   Elbow 23.1 cm 22.0 cm (+) 22.8 cm   10 cm Proximal to Lateral  Epicondyle 27.5 cm 28.9 cm (-) 27.8 cm   Axillary Crease 30.0 cm 30.2 cm (-)28.7 cm   TOTAL 133.2 cm 134.0 cm (-) 133.9 cm     Active Range of Motion: (Dowel Geoff) Shoulder flexion (Supine): 162 degrees. TREATMENT   Precautions: -RIGHT UPPER EXTREMITY Lymphedema Risk  -NO BLOOD PRESSURE/DRAW IN RIGHT UE.  -Skin Care precautions (Decrease risk of infection). Pain: -PAIN LOCATION: Right axillary region radiating down right lateral trunk and right posterior trunk. -PAIN RATIN/10 (During range of motion)  -PAIN DESCRIPTION: Sore, tightness, fullness-reported by patient.     X in shaded column indicates activity completed today   Modalities Parameters/  Location  Notes   -      Manual Therapy Time/Technique  Notes   Manual Lymph Drainage (MLD) Right Upper Extremity: Trunk: Effleurage, Profundus, Terminus, Shoulder Collectors, Axillary Lnn, Anterior AAA (Axillary Anastomosis) (3 Steps), Inguinal Lnn, (AI) Anterior Axillary to Inguinal, Posterior AAA (Axillary Anastomosis) (3 Steps) and Posterior Axillary to Inguinal, Upper Extremity: Upper Arm (Anterior, Posterior, Lateral, Medial to Lateral), Antecubital Fossa (Anterior, Posterior, Lateral, Medial), Forearm (Anterior, Posterior), Wrist/Hand (Anterior, Posterior) and Fingers, Rework: Posterior (AI), Posterior (AA), Anterior (AA), Inguinal Lnn, Axillary Lnn, Shoulder Collectors, Terminus, Profundus and Effleurage X -POSITION: Supine and head of bed slightly elevated and bilateral knees supported on bolster. -PATIENT TOLERANCE: Patient denied pain during MLD. However, she reported soreness with light Fibrotic Technique on the posterior aspect of the right trunk/axillary region. Physiotouch -PHYSIOTOUCH  Treatment Pressure: 80 mmHg  Continuous vs. Pulsed:Pulsed    Frequency: 60Hz  Seconds: 2 seconds  Percentage: 50%  Duration: (Rework) 3 pulsations along Posterior AA & AI pathways. Cup Size: Medium  Goal for Use: Decongestion and Pain Reduction  Patient Tolerance: good X -POSITION: Left sidelying. with pillow supported under neck. -PATIENT TOLERANCE: Patient denied pain during Physiotouch treatment. Myofascial techniques to scar tissue at incisional area with manual lymphatic drainage to R breast -  -   Exercise/Intervention   Notes   -Shoulder flexion (Dowel marlon) X 10 reps  5 second hold x -Reported stretching.   -Shoulder horizontal abduction x5 reps 5 second hold x -Reported stretching sensation in chest wall/anterior axillary region. Specific Interventions for Next Treatment:    1) Continue Complete Decongestive Therapy/Manual Lymph Drainage (CDT/MLD). 2) Continued awareness/education to reduce symptoms and provide control over Lymphedema.   3) Continue range of motion/stretching/fibrotic technique/scar tissue kofi.    Activity Tolerance: Patient tolerated todays treatment fair (+) with reports of tenderness during MLD and streching sensations during range of motion. ASSESSMENT  2022: The patient is a pleasant and cooperative 59year old female who has made good progress with the Oncology Rehab/Complete Decongestive Therapy/Manual Lymph Drainage (CDT/MLD) treatments/Home Program as noted by the followin) The patient has demonstrated a decrease in total circumferential measurements in comparison to baseline measurements by 0.1 cm from the right upper extremity. 2) The patient has met 1/4 long term goals. 4) The patient has demonstrated decreased dry skin. The patient has good potential to meet all remaining goals while progressing through Phases I & II working towards becoming modified independent prior to discharge to home management. Highly recommend sequential/pneumatic compression pump for home. GOALS:  Patient Goal: Get rid of lymphedema - GOAL ONGOING.     Short Term Goals to be met in 12 weeks:  1. See LTGs     Long Term Goals to be met in 12 weeks:   1. Pt to improve QuickDASH score from 4.5% impaired to 0% impaired for improved shoulder function for recreational activities. GOAL NOT MET: 4.6% impaired - notes difficulty with carrying shopping bags and \"ache\" in arm when using her computer - discussed importance of strengthening and to monitor posture with computer use. Continue Goal    2. Pt to demo R shoulder PROM flexion improved from 171 deg to 175 deg for ease with reaching tasks. GOAL MET:  175 deg. Discontinue Goal    3. Pt to demo fullness in R upper back reduced for improved comfort with wearing bra and sleeping. GOAL NOT MET: Improvement but still present. Continue Goal    4. Pt to be independent with HEP for lymphedema management to control her symptoms and reduce the risk of cellulitis. GOAL NOT MET: Updated HEP for strengthening activities this date. .  Continue Goal    Patient Education:   1) Lymphedema Awareness continued with types of Lymphedema, risk and signs of infection, Lymphedema program progression. 2) Reviewed goals and plan of care. Education Outcome: Verbalized understanding  Education Barriers: None    PLAN: Continue established plan of care. Recommend at least 1x/week for 8 weeks. THE PATIENT DEMONSTRATES GOOD POTENTIAL TO MAKE GOOD PROGRESS AND MEET ALL GOALS WITH TREATMENTS COMPLETED BY A SKILLED PHYSICAL THERAPIST/PHYSICAL THERAPIST ASSISTANT. Time In 0815   Time Out 0910   Timed Code Minutes: 55 min   Total Treatment Time: 55 min     Ryan Cohen TO ASSIST IN THE CARE AND TREATMENT OF THIS PATIENT!      Electronically Signed by: Natali Leon PT, CLT-CRISTOPHER, KEENA    4/22/2022

## 2022-04-26 ENCOUNTER — HOSPITAL ENCOUNTER (OUTPATIENT)
Dept: PHYSICAL THERAPY | Age: 62
Setting detail: THERAPIES SERIES
Discharge: HOME | End: 2022-04-26
Payer: COMMERCIAL

## 2022-04-26 PROCEDURE — 97140 MANUAL THERAPY 1/> REGIONS: CPT

## 2022-04-26 PROCEDURE — 97110 THERAPEUTIC EXERCISES: CPT

## 2022-04-26 NOTE — PROGRESS NOTES
[address] [facility]  ONCOLOGY REHABILITATION  PHYSICAL THERAPY  [x] DAILY NOTE [] PROGRESS NOTE [] DISCHARGE NOTE    [x] [facility] [place]     Date: 2022  Patient Name:  Luis A Madrid  : 1960  MRN: [id number]       Referring Practitioner Damián Pearson CNP   Diagnosis Malignant neoplasm of upper-outer quadrant of right female breast [C50.411]    Treatment Diagnosis R breast lymphedema   Date of Evaluation 21    Additional Pertinent History thyroid disease, osteoporosis       Functional Outcome Measure Used O: QuickDASH   Functional Outcome Score 4.5% impaired (21)        Insurance: Primary: Payor: [employer]u /  /  / ,   Secondary:    Authorization Information: Aquatics and modalities covered   Visit # 10, 210 for progress note   Visits Allowed: Unlimited   Recertification Date:  (8 weeks)   Physician Follow-Up: 22 Zita Homans, CNP   Physician Orders: Breast lymphedema   History of Present Illness: 20 R IDC ER/VT+, HER2-, 20 R Lumpectomy with SLNB with 0/1 node +, 20 re-excision, completed radiation on 2020, on Arimidex, completed care for R breast lymphedema from 21 - 21      SUBJECTIVE:   2022 The patient presented to the Oncology Rehabilitation clinic with reports of less tightness in posterior aspect of right axillary regions and right lateral trunk. No skin irritation noted at KT/elastic test strips on lateral trunk and breast.     TREATMENT   Precautions: -RIGHT UPPER EXTREMITY Lymphedema Risk  -NO BLOOD PRESSURE/DRAW IN RIGHT UE.  -Skin Care precautions (Decrease risk of infection). Pain: -PAIN LOCATION: Right axillary region. .  -PAIN RATIN/10 (During range of motion)  -PAIN DESCRIPTION: Sore, tightness, fullness-reported by patient. (Noted continued tenderness at right lateral breast).     X in shaded column indicates activity completed today   Modalities Parameters/  Location  Notes   -      Manual Therapy Time/Technique  Notes   Manual Lymph Drainage (MLD) Right Upper Extremity: Trunk: Effleurage, Profundus, Terminus, Shoulder Collectors, Axillary Lnn, Anterior AAA (Axillary Anastomosis) (3 Steps), Inguinal Lnn, (AI) Anterior Axillary to Inguinal, Posterior AAA (Axillary Anastomosis) (3 Steps) and Posterior Axillary to Inguinal, Upper Extremity: Upper Arm (Anterior, Posterior, Lateral, Medial to Lateral), Antecubital Fossa (Anterior, Posterior, Lateral, Medial), Forearm (Anterior, Posterior), Wrist/Hand (Anterior, Posterior) and Fingers, Rework: Posterior (AI), Posterior (AA), Anterior (AA), Inguinal Lnn, Axillary Lnn, Shoulder Collectors, Terminus, Profundus and Effleurage X -POSITION: Supine and head of bed slightly elevated and bilateral knees supported on bolster. -PATIENT TOLERANCE: Patient denied pain during MLD. However, she reported soreness with light Fibrotic Technique on the posterior aspect of the right trunk/axillary region. Physiotouch -PHYSIOTOUCH  Treatment Pressure: 80 mmHg  Continuous vs. Pulsed:PulsedFrequency: 60Hz  Seconds: 2 seconds  Percentage: 50%  Duration: (Rework) 3 pulsations along Posterior AA & AI pathways. Cup Size: Medium  Goal for Use: Decongestion and Pain Reduction  Patient Tolerance: good X -POSITION: Left sidelying. with pillow supported under neck. -PATIENT TOLERANCE: Patient denied pain during Physiotouch treatment. KT/Elastic tape -LOCATION (# 1): Right Posterior/lateral (JAYLYN). -ANCHOR: (Proximal) Medially  -ENDS: (Distal) Extended towards right lateral trunk. -COLOR OF ELASTIC TAPE: Beige  -AMOUNT OF STRETCH ([place]bo/End): 0%  -AMOUNT OF STRETCH (Tails): 15%  -NUMBER OF TAILS: 3  -PURPOSE OF ELASTIC TAPE:       -Decongestion       -Pain Reduction       -LOCATION (# 2): Right Posterior/lateral (CHETAN).   -ANCHOR: (Proximal) Medially/distally  -ENDS: (Distal) Extended towards axilla  -COLOR OF ELASTIC TAPE: Beige  -AMOUNT OF STRETCH ([place]otebo/End): 0%  -AMOUNT OF computer use. Continue Goal    2. Pt to demo R shoulder PROM flexion improved from 171 deg to 175 deg for ease with reaching tasks. GOAL MET:  175 deg. Discontinue Goal    3. Pt to demo fullness in R upper back reduced for improved comfort with wearing bra and sleeping. GOAL NOT MET: Improvement but still present. Continue Goal    4. Pt to be independent with HEP for lymphedema management to control her symptoms and reduce the risk of cellulitis. GOAL NOT MET: Updated HEP for strengthening activities this date. .  Continue Goal    Patient Education:   1) Lymphedema Awareness continued with types of Lymphedema, risk and signs of infection, Lymphedema program progression. 2) Reviewed goals and plan of care. Education Outcome: Verbalized understanding  Education Barriers: None    PLAN: Continue established plan of care. Recommend at least 1x/week for 8 weeks. THE PATIENT DEMONSTRATES GOOD POTENTIAL TO MAKE GOOD PROGRESS AND MEET ALL GOALS WITH TREATMENTS COMPLETED BY A SKILLED PHYSICAL THERAPIST/PHYSICAL THERAPIST ASSISTANT. Time In 0745   Time Out 0843   Timed Code Minutes: 58 min   Total Treatment Time: 58 min     THANK YOU FOR ALLOWING US TO ASSIST IN THE CARE AND TREATMENT OF THIS PATIENT!      Electronically Signed by: Essie Damon PT, CLKASI-CRISTOPHER, KEENA    4/26/2022

## 2022-05-06 ENCOUNTER — HOSPITAL ENCOUNTER (OUTPATIENT)
Dept: PHYSICAL THERAPY | Age: 62
Setting detail: THERAPIES SERIES
Discharge: HOME | End: 2022-05-06
Payer: COMMERCIAL

## 2022-05-06 PROCEDURE — 97110 THERAPEUTIC EXERCISES: CPT

## 2022-05-06 PROCEDURE — 97140 MANUAL THERAPY 1/> REGIONS: CPT

## 2022-05-06 NOTE — PROGRESS NOTES
[address] [facility]  ONCOLOGY REHABILITATION  PHYSICAL THERAPY  [x] DAILY NOTE [] PROGRESS NOTE [] DISCHARGE NOTE    [x] [facility]     Date: 2022  Patient Name:  Trace Mckeon  : 1960  MRN: [id number]     Referring Practitioner Trish Hall CNP   Diagnosis Malignant neoplasm of upper-outer quadrant of right female breast [C50.411]    Treatment Diagnosis R breast lymphedema   Date of Evaluation 21    Additional Pertinent History thyroid disease, osteoporosis       Functional Outcome Measure Used O: QuickDASH   Functional Outcome Score 4.5% impaired (21)        Insurance: Primary: Payor: [employer] /  /  / ,   Secondary:    Authorization Information: Aquatics and modalities covered   Visit # 11, 3/10 for progress note   Visits Allowed: Unlimited   Recertification Date:  (8 weeks)   Physician Follow-Up: 22 Mariana Mcgill CNP   Physician Orders: Breast lymphedema   History of Present Illness: 20 R IDC ER/IN+, HER2-, 20 R Lumpectomy with SLNB with 0/1 node +, 20 re-excision, completed radiation on 2020, on Arimidex, completed care for R breast lymphedema from 21 - 21      SUBJECTIVE:   2022 The patient presented to the Oncology Rehabilitation clinic with reports of increased tightness and fullness in the anterior aspect of her axillary border and chest wall, after the removal of the KT/elastic tape after 5 days of wear as instructed. Patient reported that she feels tightness when she initially wakes and and gets moving. \"Once I start moving around and stretching, I'm able to get better range of motion\" per patient. TREATMENT   Precautions: -RIGHT UPPER EXTREMITY Lymphedema Risk  -NO BLOOD PRESSURE/DRAW IN RIGHT UE.  -Skin Care precautions (Decrease risk of infection).    Pain: -PAIN LOCATION: Right axillary region and inferior aspect of the right breast.  -PAIN RATIN/10 (During range of motion)  -PAIN DESCRIPTION: Sore, tightness, fullness-reported by patient (Refer to \"Subjective\" information above. X in shaded column indicates activity completed today   Modalities Parameters/  Location  Notes   -      Manual Therapy Time/Technique  Notes   Manual Lymph Drainage (MLD) Right Upper Extremity: Trunk: Effleurage, Profundus, Terminus, Shoulder Collectors, Axillary Lnn, Anterior AAA (Axillary Anastomosis) (3 Steps), Inguinal Lnn, (AI) Anterior Axillary to Inguinal, Posterior AAA (Axillary Anastomosis) (3 Steps) and Posterior Axillary to Inguinal, Upper Extremity: Upper Arm (Anterior, Posterior, Lateral, Medial to Lateral), Antecubital Fossa (Anterior, Posterior, Lateral, Medial), Forearm (Anterior, Posterior), Wrist/Hand (Anterior, Posterior) and Fingers, Rework: Posterior (AI), Posterior (AA), Anterior (AA), Inguinal Lnn, Axillary Lnn, Shoulder Collectors, Terminus, Profundus and Effleurage X -POSITION: Supine and head of bed slightly elevated and bilateral knees supported on bolster. -PATIENT TOLERANCE: Patient reported moderate tenderness and soreness with light Fibrotic Technique/MLD on the  the right lateral breast and axillary region. Physiotouch -     KT/Elastic tape -     Myofascial techniques to scar tissue at incisional area with manual lymphatic drainage to R breast -     Exercise/Intervention   Notes   -Shoulder flexion (Dowel marlon) -      -Shoulder horizontal abduction -      -Shoulder internal/external rotation -      -Overhead Pulleys (Ceiling) X 6 reps (x sets with 2 minute rest between sets) -10 second hold on right in flexion; -5 second hold on left in flexion x -Increased shoulder flexion noted with each rep of right shoulder flexion.   -Seated row-(Dowel marlon) X 5 reps. -5 second hold x -Verbal cues for proper deep breathing   -Seated row-(Orange T/band) X 5 reps -5 second hold x -cues for scapular retraction.      Specific Interventions for Next Treatment:    1) Continue Complete Decongestive Therapy/Manual Lymph Drainage (CDT/MLD). 2) Continued awareness/education to reduce symptoms and provide control over Lymphedema. 3) Continue range of motion/stretching/fibrotic technique/scar tissue mobs. 4) Assess KT/elastic tape. Activity Tolerance: Patient tolerated todays treatment fair (+) with reports of tenderness during MLD and streching sensations during range of motion. ASSESSMENT  5/6/2022: The patient was able to tolerate the exercises with reports of less pain/tightness with the progression of each exercise. However, the patient reported and demonstrated increased tenderness at right anterior axillary border and right breast.     GOALS:  Patient Goal: Get rid of lymphedema - GOAL ONGOING.     Short Term Goals to be met in 12 weeks:  1. See LTGs     Long Term Goals to be met in 12 weeks:   1. Pt to improve QuickDASH score from 4.5% impaired to 0% impaired for improved shoulder function for recreational activities. GOAL NOT MET: 4.6% impaired - notes difficulty with carrying shopping bags and \"ache\" in arm when using her computer - discussed importance of strengthening and to monitor posture with computer use. Continue Goal    2. Pt to demo R shoulder PROM flexion improved from 171 deg to 175 deg for ease with reaching tasks. GOAL MET:  175 deg. Discontinue Goal    3. Pt to demo fullness in R upper back reduced for improved comfort with wearing bra and sleeping. GOAL NOT MET: Improvement but still present. Continue Goal    4. Pt to be independent with HEP for lymphedema management to control her symptoms and reduce the risk of cellulitis. GOAL NOT MET: Updated HEP for strengthening activities this date. .  Continue Goal    Patient Education:   1) Lymphedema Awareness continued with types of Lymphedema, risk and signs of infection, Lymphedema program progression. 2) Reviewed goals and plan of care.   Education Outcome: Verbalized understanding  Education Barriers: None    PLAN: Continue established plan of care. (Week of May 9; 1x). THE PATIENT DEMONSTRATES GOOD POTENTIAL TO MAKE GOOD PROGRESS AND MEET ALL GOALS WITH TREATMENTS COMPLETED BY A SKILLED PHYSICAL THERAPIST/PHYSICAL THERAPIST ASSISTANT. Time In 0905   Time Out 0950   Timed Code Minutes: 45 min   Total Treatment Time: 45 min     THANK YOU FOR ALLOWING US TO ASSIST IN THE CARE AND TREATMENT OF THIS PATIENT!      Electronically Signed by: Marylee Chasten, PT, JUAN-CRISTOPHER, KEENA    5/6/2022

## 2022-05-10 ENCOUNTER — HOSPITAL ENCOUNTER (OUTPATIENT)
Dept: PHYSICAL THERAPY | Age: 62
Setting detail: THERAPIES SERIES
Discharge: HOME | End: 2022-05-10
Payer: COMMERCIAL

## 2022-05-10 PROCEDURE — 97140 MANUAL THERAPY 1/> REGIONS: CPT

## 2022-05-10 PROCEDURE — 97530 THERAPEUTIC ACTIVITIES: CPT

## 2022-05-10 NOTE — PROGRESS NOTES
[facility] [facility]  ONCOLOGY REHABILITATION  PHYSICAL THERAPY  [x] DAILY NOTE [] PROGRESS NOTE [] DISCHARGE NOTE    [x] [facility]     Date: 5/10/2022  Patient Name:  Allie Myers  : 1960  MRN: [id number]     Referring Practitioner Silvia Antunez CNP   Diagnosis Malignant neoplasm of upper-outer quadrant of right female breast [C50.411]    Treatment Diagnosis R breast lymphedema   Date of Evaluation 21    Additional Pertinent History thyroid disease, osteoporosis       Functional Outcome Measure Used O: QuickDASH   Functional Outcome Score 4.5% impaired (21)        Insurance: Primary: Payor: Alina Tapia /  /  / ,   Secondary:    Authorization Information: Aquatics and modalities covered   Visit # 12, 4/10 for progress note   Visits Allowed: Unlimited   Recertification Date:  (8 weeks)   Physician Follow-Up: 22     Physician Orders: Breast lymphedema   History of Present Illness: 20 R IDC ER/MI+, HER2-, 20 R Lumpectomy with SLNB with 0/1 node +, 20 re-excision, completed radiation on 2020, on Arimidex, completed care for R breast lymphedema from 21 - 21      SUBJECTIVE:   5/10/2022 The patient presented to the Oncology Rehabilitation clinic with reports of slight increased soreness following the last treatment session in the right shoulder joint. \"I really think I did something to my right shoulder a while ago and I think when we were moving at home, that it aggravated it\" per patient. The therapist recommended that the patient make an appointment with her PCP or Ortho MD for proper diagnosis of the right shoulder. The patient verbalized slight fear due to risk of increasing Lymphedema if she needs to have surgery. The therapist informed the patient on the importance of having a proper diagnosis, she may not need surgical intervention.      TREATMENT   Precautions: -RIGHT UPPER EXTREMITY Lymphedema Risk  -NO BLOOD PRESSURE/DRAW IN RIGHT UE.  -Skin Care precautions (Decrease risk of infection). Pain: -PAIN LOCATION: Right axillary region and inferior aspect of the right breast.  -PAIN RATIN/10 (During range of motion and MLD)  -PAIN DESCRIPTION: Sore, tightness, fullness-reported by patient     X in shaded column indicates activity completed today   Modalities Parameters/  Location  Notes   -      Manual Therapy Time/Technique  Notes   Manual Lymph Drainage (MLD) Right Upper Extremity: Trunk: Effleurage, Profundus, Terminus, Shoulder Collectors, Axillary Lnn, Anterior AAA (Axillary Anastomosis) (3 Steps), Inguinal Lnn, (AI) Anterior Axillary to Inguinal, Posterior AAA (Axillary Anastomosis) (3 Steps) and Posterior Axillary to Inguinal, Upper Extremity: Upper Arm (Anterior, Posterior, Lateral, Medial to Lateral), Antecubital Fossa (Anterior, Posterior, Lateral, Medial), Forearm (Anterior, Posterior), Wrist/Hand (Anterior, Posterior) and Fingers, Rework: Posterior (AI), Posterior (AA), Anterior (AA), Inguinal Lnn, Axillary Lnn, Shoulder Collectors, Terminus, Profundus and Effleurage X -POSITION: Supine and head of bed slightly elevated and bilateral knees supported on bolster. -PATIENT TOLERANCE: Patient reported moderate tenderness and soreness with light Fibrotic Technique/MLD on the  the right lateral breast and axillary region. Physiotouch PHYSIOTOUCH  Treatment Pressure: 70mmHg  Continuous vs. Pulsed:Pulsed    Frequency: 60Hz  Seconds: 2 seconds  Percentage: 50%  Duration: (Rework) 3 pulsations along posterior upper quadrant and posterior axillary region. Cup Size: Small   Goal for Use: Decongestion and Pain Reduction  Patient Tolerance: fair (+) x -CHETAN & JAYLYN pathways  -Patient denied pain during and following Physiotouch treatment.     KT/Elastic tape -     Myofascial techniques to scar tissue at incisional area with manual lymphatic drainage to R breast -     Exercise/Intervention   Notes   -Shoulder flexion (Dowel marlon) -      -Shoulder horizontal abduction -      -Shoulder internal/external rotation -      -Overhead Pulleys (Ceiling) -      -Seated row-(Dowel marlon) -      -Seated row-(Orange T/band) -        Specific Interventions for Next Treatment:    1) Continue Complete Decongestive Therapy/Manual Lymph Drainage (CDT/MLD). 2) Continued awareness/education to reduce symptoms and provide control over Lymphedema. 3) Continue range of motion/stretching/fibrotic technique/scar tissue mobs. 4) Assess KT/elastic tape. Activity Tolerance: Patient tolerated todays treatment fair (+) with reports of tenderness during MLD and streching sensations during range of motion. ASSESSMENT  5/10/2022: The patient was able to tolerate the MLD/CDTwith reports of tenderness in anterior axillary border and chest wall during Light Fibrotic Technique. GOALS:  Patient Goal: Get rid of lymphedema - GOAL ONGOING.     Short Term Goals to be met in 12 weeks:  1. See LTGs     Long Term Goals to be met in 12 weeks:   1. Pt to improve QuickDASH score from 4.5% impaired to 0% impaired for improved shoulder function for recreational activities. GOAL NOT MET: 4.6% impaired - notes difficulty with carrying shopping bags and \"ache\" in arm when using her computer - discussed importance of strengthening and to monitor posture with computer use. Continue Goal    2. Pt to demo R shoulder PROM flexion improved from 171 deg to 175 deg for ease with reaching tasks. GOAL MET:  175 deg. Discontinue Goal    3. Pt to demo fullness in R upper back reduced for improved comfort with wearing bra and sleeping. GOAL NOT MET: Improvement but still present. Continue Goal    4. Pt to be independent with HEP for lymphedema management to control her symptoms and reduce the risk of cellulitis. GOAL NOT MET: Updated HEP for strengthening activities this date. .  Continue Goal    Patient Education:   1) Lymphedema Awareness continued with types of Lymphedema, risk and signs of infection, Lymphedema program progression. 2) Reviewed goals and plan of care. Education Outcome: Verbalized understanding  Education Barriers: None    PLAN: Continue established plan of care. (Week of May 16; 1x). THE PATIENT DEMONSTRATES GOOD POTENTIAL TO MAKE GOOD PROGRESS AND MEET ALL GOALS WITH TREATMENTS COMPLETED BY A SKILLED PHYSICAL THERAPIST/PHYSICAL THERAPIST ASSISTANT. Time In 0705   Time Out 0800   Timed Code Minutes: 55 min   Total Treatment Time: 55 min     THANK YOU FOR ALLOWING US TO ASSIST IN THE CARE AND TREATMENT OF THIS PATIENT!      Electronically Signed by: Debbi Marquis PT, TAVONT-CRISTOPHER, KEENA    5/10/2022

## 2022-05-20 ENCOUNTER — HOSPITAL ENCOUNTER (OUTPATIENT)
Dept: PHYSICAL THERAPY | Age: 62
Setting detail: THERAPIES SERIES
Discharge: HOME | End: 2022-05-20
Payer: COMMERCIAL

## 2022-05-20 PROCEDURE — 97530 THERAPEUTIC ACTIVITIES: CPT

## 2022-05-20 PROCEDURE — 97140 MANUAL THERAPY 1/> REGIONS: CPT

## 2022-05-22 NOTE — PROGRESS NOTES
[address] [facility]  ONCOLOGY REHABILITATION  PHYSICAL THERAPY  [x] DAILY NOTE [] PROGRESS NOTE [] DISCHARGE NOTE    [[facility] [place]     Date: 2022  Patient Name:  Jt Dean  : 1960  MRN: [id number]     Referring Practitioner [place], CNP   Diagnosis Malignant neoplasm of upper-outer quadrant of right female breast [C50.411]    Treatment Diagnosis R breast lymphedema   Date of Evaluation 21    Additional Pertinent History thyroid disease, osteoporosis       Functional Outcome Measure Used O: QuickDASH   Functional Outcome Score 4.5% impaired (21)        Insurance: Primary: Payor: Ale Estrada /  /  / ,   Secondary:    Authorization Information: Aquatics and modalities covered   Visit # 13, 5/10 for progress note   Visits Allowed: Unlimited   Recertification Date:  (8 weeks)   Physician Follow-Up: 22     Physician Orders: Breast lymphedema   History of Present Illness: 20 R IDC ER/ME+, HER2-, 20 R Lumpectomy with SLNB with 0/1 node +, 20 re-excision, completed radiation on 2020, on Arimidex, completed care for R breast lymphedema from 21 - 21      SUBJECTIVE:   2022 The patient presented to the Oncology Rehabilitation clinic on this date with reports of decreased tightness, and soreness in right truncal region. TREATMENT   Precautions: -RIGHT UPPER EXTREMITY Lymphedema Risk  -NO BLOOD PRESSURE/DRAW IN RIGHT UE.  -Skin Care precautions (Decrease risk of infection).    Pain: -PAIN LOCATION: Right axillary region and inferior aspect of the right breast.  -PAIN RATIN/10 (During range of motion and MLD)  -PAIN DESCRIPTION: Sore, tightness, fullness-reported by patient     X in shaded column indicates activity completed today   Modalities Parameters/  Location  Notes   -      Manual Therapy Time/Technique  Notes   Manual Lymph Drainage (MLD) Right Upper Extremity: Trunk: Effleurage, Profundus, Terminus, Shoulder Collectors, Axillary Lnn, Anterior AAA (Axillary Anastomosis) (3 Steps), Inguinal Lnn, (AI) Anterior Axillary to Inguinal, Posterior AAA (Axillary Anastomosis) (3 Steps) and Posterior Axillary to Inguinal, Upper Extremity: Upper Arm (Anterior, Posterior, Lateral, Medial to Lateral), Antecubital Fossa (Anterior, Posterior, Lateral, Medial), Forearm (Anterior, Posterior), Wrist/Hand (Anterior, Posterior) and Fingers, Rework: Posterior (AI), Posterior (AA), Anterior (AA), Inguinal Lnn, Axillary Lnn, Shoulder Collectors, Terminus, Profundus and Effleurage X -POSITION: Supine and head of bed slightly elevated and bilateral knees supported on bolster. -PATIENT TOLERANCE: Patient reported moderate tenderness and soreness with light Fibrotic Technique/MLD on the  the right lateral breast and axillary region. Physiotouch -     KT/Elastic tape -     Myofascial techniques to scar tissue at incisional area with manual lymphatic drainage to R breast -     Exercise/Intervention   Notes   -Shoulder flexion (Dowel marlon) -      -Shoulder horizontal abduction -      -Shoulder internal/external rotation -      -Overhead Pulleys (Ceiling) -      -Seated row-(Dowel marlon) -      -Seated row-(Orange T/band) -        Specific Interventions for Next Treatment:    1) Continue Complete Decongestive Therapy/Manual Lymph Drainage (CDT/MLD). 2) Continued awareness/education to reduce symptoms and provide control over Lymphedema. 3) Continue range of motion/stretching/fibrotic technique/scar tissue mobs. 4) Assess KT/elastic tape. Activity Tolerance: Patient tolerated todays treatment fair (+) with reports of tenderness during MLD and streching sensations during range of motion. ASSESSMENT  5/20/2022: The patient was able to tolerate the MLD/CDTwith reports of tenderness in anterior axillary border and chest wall during Light Fibrotic Technique.       GOALS:  Patient Goal: Get rid of lymphedema - GOAL ONGOING.     Short Term Goals to be met in 12 weeks:  1. See LTGs     Long Term Goals to be met in 12 weeks:   1. Pt to improve QuickDASH score from 4.5% impaired to 0% impaired for improved shoulder function for recreational activities. GOAL NOT MET: 4.6% impaired - notes difficulty with carrying shopping bags and \"ache\" in arm when using her computer - discussed importance of strengthening and to monitor posture with computer use. Continue Goal    2. Pt to demo R shoulder PROM flexion improved from 171 deg to 175 deg for ease with reaching tasks. GOAL MET:  175 deg. Discontinue Goal    3. Pt to demo fullness in R upper back reduced for improved comfort with wearing bra and sleeping. GOAL NOT MET: Improvement but still present. Continue Goal    4. Pt to be independent with HEP for lymphedema management to control her symptoms and reduce the risk of cellulitis. GOAL NOT MET: Updated HEP for strengthening activities this date. .  Continue Goal    Patient Education:   1) Lymphedema Awareness continued with types of Lymphedema, risk and signs of infection, Lymphedema program progression. 2) Reviewed goals and plan of care. Education Outcome: Verbalized understanding  Education Barriers: None    PLAN: Continue established plan of care. (Week of May 30; 1x). THE PATIENT DEMONSTRATES GOOD POTENTIAL TO MAKE GOOD PROGRESS AND MEET ALL GOALS WITH TREATMENTS COMPLETED BY A SKILLED PHYSICAL THERAPIST/PHYSICAL THERAPIST ASSISTANT. Time In 0850   Time Out 0945   Timed Code Minutes: 55 min   Total Treatment Time: 55 min     THANK YOU FOR ALLOWING US TO ASSIST IN THE CARE AND TREATMENT OF THIS PATIENT!      Electronically Signed by: Davina Boyd PT, CLT-CRISTOPHER, KEENA    5/20/2022

## 2022-05-24 ENCOUNTER — HOSPITAL ENCOUNTER (OUTPATIENT)
Dept: RADIATION ONCOLOGY | Age: 62
Discharge: HOME | End: 2022-05-24
Attending: RADIOLOGY
Payer: COMMERCIAL

## 2022-05-24 VITALS
HEART RATE: 87 BPM | SYSTOLIC BLOOD PRESSURE: 111 MMHG | DIASTOLIC BLOOD PRESSURE: 70 MMHG | RESPIRATION RATE: 16 BRPM | OXYGEN SATURATION: 95 % | TEMPERATURE: 98.2 F | BODY MASS INDEX: 23.62 KG/M2 | WEIGHT: 125 LBS

## 2022-05-24 DIAGNOSIS — Z17.0 MALIGNANT NEOPLASM OF AXILLARY TAIL OF RIGHT BREAST IN FEMALE, ESTROGEN RECEPTOR POSITIVE (HCC): ICD-10-CM

## 2022-05-24 DIAGNOSIS — M25.511 RIGHT SHOULDER PAIN, UNSPECIFIED CHRONICITY: Primary | ICD-10-CM

## 2022-05-24 DIAGNOSIS — C50.611 MALIGNANT NEOPLASM OF AXILLARY TAIL OF RIGHT BREAST IN FEMALE, ESTROGEN RECEPTOR POSITIVE (HCC): ICD-10-CM

## 2022-05-24 PROCEDURE — 99213 OFFICE O/P EST LOW 20 MIN: CPT | Performed by: RADIOLOGY

## 2022-05-24 PROCEDURE — 99212 OFFICE O/P EST SF 10 MIN: CPT | Performed by: RADIOLOGY

## 2022-05-24 ASSESSMENT — ENCOUNTER SYMPTOMS
ABDOMINAL PAIN: 0
NAUSEA: 0
BACK PAIN: 0
SHORTNESS OF BREATH: 0
COUGH: 0
DIARRHEA: 0
TROUBLE SWALLOWING: 0
BLOOD IN STOOL: 0
RECTAL PAIN: 0

## 2022-05-24 NOTE — PROGRESS NOTES
[facility], [address]  Phone: [phone]   Toll Free: [phone]   Fax: [phone]    RADIATION ONCOLOGY FOLLOW UP REPORT    PATIENT NAME:  Shasta Castro              : 1960  MEDICAL RECORD NO: [id number]    LOCATION: Radiation Oncology  CSN NO: [id number]      PROVIDER: Mohsen Mims MD, PhD    Tami Tyrel: 2022      FOLLOW UP PHYSICIANS: Juju James; SAMANTA Gilmore      DIAGNOSIS: N78.938 -- Malignant neoplasm of the axillary tail portion of the right female breast; Infiltrating ductal carcinoma, Grade 1 (with extenbsive intraductal component, DCIS intermediate grade); pT1b pN0(sn) M0, Stage IA; ER+; WV+; Her2-; Oncotype Dx Score 16. Date of Diagnosis: 2020      ASSESSMENT:  1. No clinical or radiographic evidence of recurrent breast cancer. 2. Mild right arm and breast lymphedema. 3. Right shoulder pain with some tenderness to palpation of shoulder soft tissues/ligaments (suspect tendinopathy)      PLAN:  1. Right shoulder MRI. Further management will be based on MRI results. 2. Radiation oncology follow-up in 6 months. As usual, Austin Moyer was reminded to call and arrange for an earlier appointment if needed for any problems questions or concerns. 3. Continue anastrozole under the direction of medical oncology  4. Continue care in medical oncology and with other physicians/providers  5.  Continue surveillance and basic/preventive/supportive health care in accordance with clinical practice guidelines      RADIATION THERAPY TREATMENT HISTORY:   Treatment Course Number: 1     Treatment Site (s) Modality Dose (cGy) From To Fractions/  Elapsed Days   Right Breast Mixed 6MV/15MV photons 4256 cGy 10/12/2020 2020 16/ 21   Right Breast Tumor Bed Boost 9Me-V electrons 1000 cGy 2020      Cumulative Dose to Right Breast Tumor Bed: 5256 cGy      CHAPERONE: Declined      HISTORY OF PRESENT ILLNESS:   Rylee Shaikh presented as a 64-year-old woman who developed pain in the right breast.  She underwent diagnostic mammogram, showing a 9 mm density in the axillary tail of the right breast.  Ultrasound showed a spiculated nodule corresponding to the mammographic abnormality, and was considered highly suggestive of malignancy.  Subsequent biopsy 8/4/2020 showed invasive ductal carcinoma, ER positive, ER positive.  HER-2/deysi was equivocal on the initial evaluation, but subsequent additional evaluation indicated that HER-2/deysi is non-amplified. Ilene Cutler was seen for surgical consultation, and had a lengthy discussion about current clinical practice guidelines, treatment options and various aspects of the different management options.  She elected to undergo breast conservation surgery.  Final pathology showed a 9 mm invasive ductal carcinoma with DCIS involving 4 of the 7 examined blocks.  Initial surgical margins were focally positive, and a reexcision was performed 8/27/2020 with no evidence of residual malignancy in the specimen.  Oncotype DX testing was performed, returning with a recurrence score of 16 (low risk).  Based on the clinical stage an Oncotype score, Breana Miller has received a recommendation for adjuvant endocrine therapy for her systemic management. Cameron Hays was seen 9/28/2020 for evaluation and discussion regarding the role of radiation therapy in the management of her early stage right breast cancer. After reviewing the diagnosis, clinical practice guideline option/recommendations and details of the recommended radiation therapy, Breana Miller was agreeable to proceeding with treatment. Breana Miller tolerated her radiation therapy as expected. She developed some increased breast puffiness and tenderness, with a modest skin reaction. Not have any unexpected side effects during the course of therapy.       INTERVAL HISTORY:   Rylee Shaikh returns to radiation oncology clinic today 5/24/2022 for a follow-up appointment about a year and a half after completing adjuvant radiation therapy for her right breast cancer. She remains on antiestrogen therapy with anastrozole. Present has mild right arm lymphedema and some persistent right breast lymphedema related to her radiation therapy. She is also developing some discomfort in her right shoulder which may be related to local soft tissue puffiness or perhaps some stress on the tendons due to fibrosis of the pectoralis fashion inferiorly. Otherwise, Les Connelly has some breast tenderness and has noted significant breast asymmetry with the right breast smaller than the left. She does not have any new complaints related to radiation therapy. TESTS:  RADIOLOGIC STUDIES:   7/14/2021: Mammogram Hilario digital screening bilateral:  Impression   No mammographic evidence of malignancy. A 1 year screening mammogram is recommended.       A result letter will be sent to the patient. Vignesh Morrissey will also receive a reminder 1 month prior to her next mammogram.       BI-RADS CATEGORY 2: BENIGN FINDINGS.       Management Recommendation: Routine annual mammography.      LABORATORY STUDIES:   CBC:   Lab Results   Component Value Date    WBC 8.3 07/27/2018    RBC 4.26 07/27/2018    RBC 0-5 06/01/2017    HGB 14.3 08/12/2020    HCT 43.9 08/12/2020    MCV 94.1 07/27/2018    MCH 32.2 07/27/2018    MCHC 34.2 07/27/2018    RDW 12.9 06/01/2017     07/27/2018    MPV 9.1 91/36/9966     MetabolicPanel:  Lab Results   Component Value Date     07/27/2018    K 4.3 07/27/2018    CL 98 07/27/2018    CO2 25 07/27/2018    BUN 15 07/27/2018    CREATININE 0.6 07/27/2018    LABGLOM >90 07/27/2018    GLUCOSE 111 07/27/2018    CALCIUM 10.0 07/27/2018    BILITOT NEGATIVE 06/01/2017         MEDICATIONS:   Current Outpatient Medications   Medication Sig Dispense Refill    anastrozole (ARIMIDEX) 1 MG tablet Take 1 tablet by mouth daily 30 tablet 11    Cholecalciferol 400 UNIT TABS tablet Take 400 Units by mouth daily      thyroid (GERONIMO THYROID) 15 MG tablet Take 15 mg by mouth daily Alternate every other day with 30 mg       No current facility-administered medications for this encounter. Review of Systems   Constitutional: Negative for activity change, appetite change, fatigue and unexpected weight change. HENT: Negative for ear pain and trouble swallowing. Respiratory: Negative for cough and shortness of breath. Cardiovascular: Negative for chest pain and leg swelling. Gastrointestinal: Negative for abdominal pain, blood in stool, diarrhea, nausea and rectal pain. Genitourinary: Negative for dysuria, frequency, hematuria and urgency. Musculoskeletal: Positive for arthralgias (Right shoulder since heavy lifting). Negative for back pain and myalgias. Skin: Negative for rash and wound. Neurological: Negative for dizziness, weakness, light-headedness, numbness and headaches. Psychiatric/Behavioral: Negative for confusion. A complete review of systems was performed and found to be negative except as presented above. EXAMINATION:   GENERAL: Well-developed adult Female, alert and oriented ×3 in no obvious distress. Clear mentation with appropriate affect. VITAL SIGNS:  /70   Pulse 87   Temp 98.2 °F (36.8 °C) (Infrared)   Resp 16   Wt 125 lb (56.7 kg)   SpO2 95%   BMI 23.62 kg/m²   PAIN: 0-3/10 (Right breast tenderness to palpation)  ECO  HEENT: Atraumatic, normocephalic. PERRL/EOMI; ears, nose and lips unremarkable on external examination; oral cavity within normal limits  NECK: No JVD. No palpable cervical lymphadenopathy. THORAX: No palpable supraclavicular or axillary lymphadenopathy. LUNGS: Clear to auscultation. HEART: Non-tachycardic, regular  BREASTS: Significant breast asymmetry. Right/treated breast smaller than the left. The right breast also has architectural change which accentuates the asymmetry.   There is significant breast tenderness bilaterally. There are underlying fibrocystic changes in both breasts. On the right side, there is some skin change most noticeable along the inferior breast with thinning of the epidermis and some thickening of underlying dermis. Again there are no suspicious findings. There is some residual edema/puffiness of the right breast which is most prominent inferiorly. ABDOMEN: Soft, nondistended, nontender with unremarkable bowel sounds. EXTREMITIES: No clubbing or cyanosis. Mild right arm lymphedema is present. Right shoulder with some ligament/tendon tenderness to palpation. Some localized soft tissue puffiness is present. NEUROLOGIC EXAMINATION: Cranial nerves grossly intact. No obvious focal neurologic deficits. SKIN: Skin of right breast as described above. Electronically signed by Ashley Eduardo   Radiation Oncology    CC: Juju Benjamin  ACC: Tumor Registry: [place] [facility]    This document was created using a voice-recognition program.  Computer generated transcription errors may be present.

## 2022-06-07 ENCOUNTER — HOSPITAL ENCOUNTER (OUTPATIENT)
Dept: PHYSICAL THERAPY | Age: 62
Setting detail: THERAPIES SERIES
Discharge: HOME | End: 2022-06-07
Payer: COMMERCIAL

## 2022-06-07 PROCEDURE — 97530 THERAPEUTIC ACTIVITIES: CPT

## 2022-06-07 PROCEDURE — 97140 MANUAL THERAPY 1/> REGIONS: CPT

## 2022-06-07 PROCEDURE — 97110 THERAPEUTIC EXERCISES: CPT

## 2022-06-07 NOTE — PROGRESS NOTES
[address] [facility]  ONCOLOGY REHABILITATION  PHYSICAL THERAPY  [x] DAILY NOTE [] PROGRESS NOTE [] DISCHARGE NOTE    [x] [facility]     Date: 2022  Patient Name:  Bret Rosario  : 1960  MRN: [id number]     Referring Practitioner Nazia Barahona CNP/Dr. Shanice Packer MD   Diagnosis Malignant neoplasm of upper-outer quadrant of right female breast [C50.411]    Treatment Diagnosis R breast lymphedema   Date of Evaluation 21    Additional Pertinent History thyroid disease, osteoporosis       Functional Outcome Measure Used O: QuickDASH   Functional Outcome Score 4.5% impaired (21)        Insurance: Primary: Payor: Rolando Lynn /  /  / ,   Secondary:    Authorization Information: Aquatics and modalities covered   Visit # 14, 6/10 for progress note   Visits Allowed: Unlimited   Recertification Date:    Physician Follow-Up: 22     Physician Orders: Breast lymphedema   History of Present Illness: 20 R IDC ER/AK+, HER2-, 20 R Lumpectomy with SLNB with 0/1 node +, 20 re-excision, completed radiation on 2020, on Arimidex, completed care for R breast lymphedema from 21 - 21      SUBJECTIVE:   2022 The patient presented to the Oncology Rehabilitation clinic on this date and reported, \"I feel like I'm on a good path upward. I'm getting better (less sore, and less fatigue\" per patient. The patient reported that her Radiation Oncologist informed her that she has fibrosis in her right breast and she stated that it bothered her for a couple of days.      OBJECTIVE:  Circumferential Measurements:    Left Right (Baseline)  (21) Right   (22) Right  (22)   Metacarpal Heads  17.6 cm 17.6 cm (+) 17.8 cm 17.4   Ulnar Styloid Process 14.0 cm 13.7 cm (+) 14.7 cm 14.6   10 cm Distal to Lateral  Epicondyle 21.0 cm 21.6 cm (+) 22.1 cm (+) 22.7   Elbow 23.1 cm 22.0 cm (+) 22.8 cm (+) 23.4   10 cm Proximal to Lateral  Epicondyle 27.5 cm 28.9 cm (-) 27.8 cm (+) 28.9   Axillary Crease 30.0 cm 30.2 cm (-)28.7 cm 28.2   TOTAL 133.2 cm 134.0 cm (-) 133.9 cm (+) 135.2     TREATMENT   Precautions: -RIGHT UPPER EXTREMITY Lymphedema Risk  -NO BLOOD PRESSURE/DRAW IN RIGHT UE.  -Skin Care precautions (Decrease risk of infection). Pain: -PAIN LOCATION: Right anterior axillary region and lateral aspect of the right breast.  -PAIN RATIN/10 (During MLD)  -PAIN DESCRIPTION: Sore, tender)    X in shaded column indicates activity completed today   Modalities Parameters/  Location  Notes   -      Manual Therapy Time/Technique  Notes   Manual Lymph Drainage (MLD) Right Upper Extremity: Trunk: Effleurage, Profundus, Terminus, Shoulder Collectors, Axillary Lnn, Anterior AAA (Axillary Anastomosis) (3 Steps), Inguinal Lnn, (AI) Anterior Axillary to Inguinal, Posterior AAA (Axillary Anastomosis) (3 Steps) and Posterior Axillary to Inguinal, Upper Extremity: Upper Arm (Anterior, Posterior, Lateral, Medial to Lateral), Antecubital Fossa (Anterior, Posterior, Lateral, Medial), Forearm (Anterior, Posterior), Wrist/Hand (Anterior, Posterior) and Fingers, Rework: Posterior (AI), Posterior (AA), Anterior (AA), Inguinal Lnn, Axillary Lnn, Shoulder Collectors, Terminus, Profundus and Effleurage X -POSITION: Supine and head of bed slightly elevated and bilateral knees supported on bolster. -PATIENT TOLERANCE: Patient reported moderate tenderness and soreness with light Fibrotic Technique/MLD on the  the right lateral breast and axillary region. Fibrotic Tissue Technique -LOCATION: Right superior/lateral aspect of right breast and anterior axillary border (Right). -AMOUNT OF PRESSURE: Light moderate. -PATIENT TOLERANCE: Patient reported tenderness. X -Goal: Fibrotic Tissue Breakdown/Softening.    Compression Accessories -Supply: Small crescent shaped Cherry Pitpak.  -Purpose: Fibrotic tissue breakdown.  -Patient verbalized a good understanding of wearing schedule and purpose. X -Patient was instructed to place the Pitpak in her bra for additional compression to the right breast. (Recommended alternate between pitpak and compression pad. KT/Elastic tape -LOCATION (# 1): Lateral aspect of right breast.  -ANCHOR: (Proximal) Proximally  -ENDS: (Distal) Extended distally  -COLOR OF ELASTIC TAPE: Beige  -AMOUNT OF STRETCH ([place]/End): 0%  -AMOUNT OF STRETCH (Tails): 15%  -NUMBER OF TAILS: 2 (1 square)2  -PURPOSE OF ELASTIC TAPE:       -Decongestion       -Pain Reduction       -Fibrotic Tissue Breakdown   -LOCATION (# 2): Right lateral trunk  -ANCHOR: (Proximal) Posterior/lateral trunk. -ENDS: (Distal) Extended towards lateral aspect of right breast.  -COLOR OF ELASTIC TAPE: Beige  -AMOUNT OF STRETCH ([place]/End): 0%  -AMOUNT OF STRETCH (Tails): 15%  -NUMBER OF TAILS: 4  -PURPOSE OF ELASTIC TAPE:       -Decongestion       -Pain Reduction       -Fibrotic Tissue Breakdown X -Reviewed KT/elastic tape precautions.  -Skin integrity intact. Myofascial techniques to scar tissue at incisional area with manual lymphatic drainage to R breast -     Exercise/Intervention   Notes   -Shoulder flexion  -x 5 reps 10-15 sec. hold X -Supine   -Shoulder horizontal abduction -x 5 reps 10-15 sec. hold X -Supine   -Shoulder internal/external rotation -x 5 reps 10-15 sec. hold X -Supine   -Overhead Pulleys (Ceiling) -      -Seated row-(Dowel marlon) -      -Seated row-(Orange T/band) -        Specific Interventions for Next Treatment:    1) Continue Complete Decongestive Therapy/Manual Lymph Drainage (CDT/MLD). 2) Continued awareness/education to reduce symptoms and provide control over Lymphedema. 3) Continue range of motion/stretching/fibrotic technique/scar tissue mobs. 4) Assess tolerance of KT/elastic tape. 5) Assess tolerance of Cherry Pitpak and compression pad for fibrotic tissue breakdown.     Activity Tolerance: Patient tolerated todays treatment fair (+) with reports of tenderness during MLD and streching sensations during range of motion. ASSESSMENT  6/7/2022  The patient was able to tolerate increased pressure during MLD of the right breast and truncal region. The patient demonstrated good understanding of plan of care and encouragement to apply compression [facility] Pitpak or compression pad) daily and increasing tolerance via increased duration. GOALS:  Patient Goal: Get rid of lymphedema - GOAL ONGOING.     Short Term Goals to be met in 12 weeks:  1. See LTGs     Long Term Goals to be met in 12 weeks:   1. Pt to improve QuickDASH score from 4.5% impaired to 0% impaired for improved shoulder function for recreational activities. GOAL NOT MET: 4.6% impaired - notes difficulty with carrying shopping bags and \"ache\" in arm when using her computer - discussed importance of strengthening and to monitor posture with computer use. Continue Goal    2. Pt to demo R shoulder PROM flexion improved from 171 deg to 175 deg for ease with reaching tasks. GOAL MET:  175 deg. Discontinue Goal    3. Pt to demo fullness in R upper back reduced for improved comfort with wearing bra and sleeping. GOAL NOT MET: Improvement but still present. Continue Goal    4. Pt to be independent with HEP for lymphedema management to control her symptoms and reduce the risk of cellulitis. GOAL NOT MET: Updated HEP for strengthening activities this date. .  Continue Goal    Patient Education:   1) Lymphedema Awareness continued with types of Lymphedema, risk and signs of infection, Lymphedema program progression. 2) Reviewed goals and plan of care. Education Outcome: Verbalized understanding  Education Barriers: None    PLAN: Continue established plan of care. (Follow up for re-assessment in approximately 4-5 weeks)    THE PATIENT DEMONSTRATES GOOD POTENTIAL TO MAKE GOOD PROGRESS AND MEET ALL GOALS WITH TREATMENTS COMPLETED BY A SKILLED PHYSICAL THERAPIST/PHYSICAL THERAPIST ASSISTANT.      Time In [facility] 30 Time Out 1045   Timed Code Minutes: 55 min   Total Treatment Time: 55 min     THANK YOU FOR ALLOWING US TO ASSIST IN THE CARE AND TREATMENT OF THIS PATIENT!      Electronically Signed by: Dara Lyman PT, JUAN-KEENA NICHOLAS    6/7/2022

## 2022-06-20 ENCOUNTER — HOSPITAL ENCOUNTER (OUTPATIENT)
Dept: MRI IMAGING | Age: 62
Discharge: HOME | End: 2022-06-20
Payer: COMMERCIAL

## 2022-06-20 DIAGNOSIS — M25.511 RIGHT SHOULDER PAIN, UNSPECIFIED CHRONICITY: ICD-10-CM

## 2022-06-20 DIAGNOSIS — Z17.0 MALIGNANT NEOPLASM OF AXILLARY TAIL OF RIGHT BREAST IN FEMALE, ESTROGEN RECEPTOR POSITIVE (HCC): ICD-10-CM

## 2022-06-20 DIAGNOSIS — C50.611 MALIGNANT NEOPLASM OF AXILLARY TAIL OF RIGHT BREAST IN FEMALE, ESTROGEN RECEPTOR POSITIVE (HCC): ICD-10-CM

## 2022-06-20 PROCEDURE — 73221 MRI JOINT UPR EXTREM W/O DYE: CPT

## 2022-06-28 ENCOUNTER — HOSPITAL ENCOUNTER (OUTPATIENT)
Dept: RADIATION ONCOLOGY | Age: 62
Discharge: HOME | End: 2022-06-28
Attending: RADIOLOGY
Payer: COMMERCIAL

## 2022-06-28 VITALS
TEMPERATURE: 97.9 F | BODY MASS INDEX: 24.03 KG/M2 | OXYGEN SATURATION: 97 % | HEART RATE: 67 BPM | WEIGHT: 127.2 LBS | SYSTOLIC BLOOD PRESSURE: 115 MMHG | RESPIRATION RATE: 16 BRPM | DIASTOLIC BLOOD PRESSURE: 59 MMHG

## 2022-06-28 DIAGNOSIS — C50.611 MALIGNANT NEOPLASM OF AXILLARY TAIL OF RIGHT BREAST IN FEMALE, ESTROGEN RECEPTOR POSITIVE (HCC): Primary | ICD-10-CM

## 2022-06-28 DIAGNOSIS — Z17.0 MALIGNANT NEOPLASM OF AXILLARY TAIL OF RIGHT BREAST IN FEMALE, ESTROGEN RECEPTOR POSITIVE (HCC): Primary | ICD-10-CM

## 2022-06-28 PROCEDURE — 99212 OFFICE O/P EST SF 10 MIN: CPT | Performed by: RADIOLOGY

## 2022-06-28 PROCEDURE — 99213 OFFICE O/P EST LOW 20 MIN: CPT | Performed by: RADIOLOGY

## 2022-06-28 ASSESSMENT — ENCOUNTER SYMPTOMS
BLOOD IN STOOL: 0
COUGH: 0
RECTAL PAIN: 0
DIARRHEA: 0
SHORTNESS OF BREATH: 0
NAUSEA: 0
BACK PAIN: 0
ABDOMINAL PAIN: 0
TROUBLE SWALLOWING: 0

## 2022-06-28 NOTE — PROGRESS NOTES
[facility], [address]  Phone: [phone]   Toll Free: [phone]   Fax: [phone]    RADIATION ONCOLOGY FOLLOW UP REPORT    PATIENT NAME:  Annie Davila              : 1960  MEDICAL RECORD NO: [id number]    LOCATION: Radiation Oncology  CSN NO: [id number]      PROVIDER: Cristhian Chua MD, PhD    Isabelle Curry: 2022      FOLLOW UP PHYSICIANS: Juju Beaver; SAMANTA Hatfield      DIAGNOSIS: P37.456 -- Malignant neoplasm of the axillary tail portion of the right female breast; Infiltrating ductal carcinoma, Grade 1 (with extenbsive intraductal component, DCIS intermediate grade); pT1b pN0(sn) M0, Stage IA; ER+; DE+; Her2-; Oncotype Dx Score 16. Date of Diagnosis: 2020      ASSESSMENT:  1. Partial supraspinatus tendon tear as explanation for the shoulder pain. 2. 4 mm area of signal abnormality in the proximal humerus found incidentally on MRI scan, indeterminate. Radiologist suggests consideration of scintigraphic imaging. PLAN:  1. Bone scan  2. Follow-up phone call after scan to give results. If concern for metastatic disease, consider PET scan. Follow-up office visit is scheduled for 2022. 3. Continue care in medical oncology and with other physicians/providers  4.  Continue surveillance and basic/preventive/supportive health care in accordance with clinical practice guidelines      RADIATION THERAPY TREATMENT HISTORY:   Treatment Course Number: 1     Treatment Site (s) Modality Dose (cGy) From To Fractions/  Elapsed Days   Right Breast Mixed 6MV/15MV photons 4256 cGy 10/12/2020 2020 16/ 21   Right Breast Tumor Bed Boost 9Me-V electrons 1000 cGy 2020      Cumulative Dose to Right Breast Tumor Bed: 5256 cGy      CHAPERONE: Declined      HISTORY OF PRESENT ILLNESS:   Andressa Vazquez presented as a 64-year-old woman who developed pain in the right breast.  She underwent diagnostic mammogram, showing a 9 mm density in the axillary tail of the right breast.  Ultrasound showed a spiculated nodule corresponding to the mammographic abnormality, and was considered highly suggestive of malignancy.  Subsequent biopsy 8/4/2020 showed invasive ductal carcinoma, ER positive, ER positive.  HER-2/deysi was equivocal on the initial evaluation, but subsequent additional evaluation indicated that HER-2/deysi is non-amplified. Jose Huerta was seen for surgical consultation, and had a lengthy discussion about current clinical practice guidelines, treatment options and various aspects of the different management options.  She elected to undergo breast conservation surgery.  Final pathology showed a 9 mm invasive ductal carcinoma with DCIS involving 4 of the 7 examined blocks.  Initial surgical margins were focally positive, and a reexcision was performed 8/27/2020 with no evidence of residual malignancy in the specimen.  Oncotype DX testing was performed, returning with a recurrence score of 16 (low risk).  Based on the clinical stage an Oncotype score, Anselmo Kumar has received a recommendation for adjuvant endocrine therapy for her systemic management. Erick Oliveira was seen 9/28/2020 for evaluation and discussion regarding the role of radiation therapy in the management of her early stage right breast cancer. After reviewing the diagnosis, clinical practice guideline option/recommendations and details of the recommended radiation therapy, Anselmo Kumar was agreeable to proceeding with treatment. Anselmo Kumar tolerated her radiation therapy as expected. She developed some increased breast puffiness and tenderness, with a modest skin reaction. Not have any unexpected side effects during the course of therapy. INTERVAL HISTORY:   Truong Hernandez comes in to radiation oncology clinic today 6/28/2022 for a follow-up appointment to review her recent shoulder MRI.   The scan was obtained because she was having some shoulder pain that was aggravated with certain motions. As shown below, the MRI scan shows a partial thickness tear of the supra spinatus tendon. There is incidental note of a 4 mm area of signal abnormality in the proximal humerus of uncertain significance. The shoulder pain persists. She does not have any new complaints related to her radiation therapy. TESTS:  RADIOLOGIC STUDIES:   6/20/2022: MRI shoulder right:  Impression   1. Low-grade bursal surface partial-thickness tear of the supraspinatus tendon at the footplate. 2. Indeterminate 4 mm T1 hypointense and T2 hyperintense focus is seen in the proximal humeral diaphysis. Given the history of a known malignancy, consider further evaluation with bone scintigraphy. LABORATORY STUDIES:   No current for review        MEDICATIONS:   Current Outpatient Medications   Medication Sig Dispense Refill    anastrozole (ARIMIDEX) 1 MG tablet Take 1 tablet by mouth daily 30 tablet 11    Cholecalciferol 400 UNIT TABS tablet Take 400 Units by mouth daily      thyroid (ARMOUR THYROID) 15 MG tablet Take 15 mg by mouth daily Alternate every other day with 30 mg       No current facility-administered medications for this encounter. Review of Systems   Constitutional: Negative for activity change, appetite change, fatigue and unexpected weight change. HENT: Negative for ear pain and trouble swallowing. Respiratory: Negative for cough and shortness of breath. Cardiovascular: Negative for chest pain and leg swelling. Gastrointestinal: Negative for abdominal pain, blood in stool, diarrhea, nausea and rectal pain. Genitourinary: Negative for dysuria, frequency, hematuria and urgency. Musculoskeletal: Positive for arthralgias (Right shoulder). Negative for back pain and myalgias. Skin: Negative for rash and wound. Neurological: Negative for dizziness, weakness, light-headedness, numbness and headaches.    Psychiatric/Behavioral: Negative for confusion. A complete review of systems was performed and found to be negative except as presented above. EXAMINATION:   GENERAL: Well-developed adult Female, alert and oriented ×3 in no obvious distress. Clear mentation with appropriate affect. VITAL SIGNS:  BP (!) 115/59   Pulse 67   Temp 97.9 °F (36.6 °C) (Infrared)   Resp 16   Wt 127 lb 3.2 oz (57.7 kg)   SpO2 97%   BMI 24.03 kg/m²   PAIN: 0/10   ECO  HEENT: Atraumatic, normocephalic. PERRL/EOMI; ears, nose and lips unremarkable on external examination; oral cavity within normal limits  NECK: No JVD. No palpable cervical lymphadenopathy. THORAX: No palpable supraclavicular or axillary lymphadenopathy. LUNGS: Clear to auscultation. HEART: Non-tachycardic, regular  ABDOMEN: Soft, nondistended, nontender with unremarkable bowel sounds. EXTREMITIES: No clubbing or cyanosis. Mild right arm lymphedema is present. NEUROLOGIC EXAMINATION: Cranial nerves grossly intact. No obvious focal neurologic deficits. SKIN: Skin of right breast as described above. Electronically signed by Noelle Banda   Radiation Oncology    CC: Juju Barker; SAMANTA Genao  ACC: Tumor Registry: [id number]    This document was created using a voice-recognition program.  Computer generated transcription errors may be present.

## 2022-07-08 ENCOUNTER — HOSPITAL ENCOUNTER (OUTPATIENT)
Dept: NUCLEAR MEDICINE | Age: 62
Discharge: HOME | End: 2022-07-08
Payer: COMMERCIAL

## 2022-07-08 DIAGNOSIS — C50.611 MALIGNANT NEOPLASM OF AXILLARY TAIL OF RIGHT BREAST IN FEMALE, ESTROGEN RECEPTOR POSITIVE (HCC): ICD-10-CM

## 2022-07-08 DIAGNOSIS — Z17.0 MALIGNANT NEOPLASM OF AXILLARY TAIL OF RIGHT BREAST IN FEMALE, ESTROGEN RECEPTOR POSITIVE (HCC): ICD-10-CM

## 2022-07-08 PROCEDURE — 78306 BONE IMAGING WHOLE BODY: CPT | Performed by: RADIOLOGY

## 2022-07-08 PROCEDURE — 3430000000 HC RX DIAGNOSTIC RADIOPHARMACEUTICAL: Performed by: RADIOLOGY

## 2022-07-08 PROCEDURE — A9503 TC99M MEDRONATE: HCPCS | Performed by: RADIOLOGY

## 2022-07-08 RX ORDER — TC 99M MEDRONATE 20 MG/10ML
25 INJECTION, POWDER, LYOPHILIZED, FOR SOLUTION INTRAVENOUS
Status: COMPLETED | OUTPATIENT
Start: 2022-07-08 | End: 2022-07-08

## 2022-07-08 RX ADMIN — TC 99M MEDRONATE 25.1 MILLICURIE: 20 INJECTION, POWDER, LYOPHILIZED, FOR SOLUTION INTRAVENOUS at 09:18

## 2022-07-11 ENCOUNTER — HOSPITAL ENCOUNTER (OUTPATIENT)
Dept: PHYSICAL THERAPY | Age: 62
Setting detail: THERAPIES SERIES
End: 2022-07-11
Payer: COMMERCIAL

## 2022-07-13 ENCOUNTER — HOSPITAL ENCOUNTER (OUTPATIENT)
Dept: PHYSICAL THERAPY | Age: 62
Setting detail: THERAPIES SERIES
Discharge: HOME | End: 2022-07-13
Payer: COMMERCIAL

## 2022-07-13 PROCEDURE — 97140 MANUAL THERAPY 1/> REGIONS: CPT

## 2022-07-13 PROCEDURE — 97530 THERAPEUTIC ACTIVITIES: CPT

## 2022-07-14 NOTE — PROGRESS NOTES
[address] [facility]  ONCOLOGY REHABILITATION  PHYSICAL THERAPY  [x] DAILY NOTE [] PROGRESS NOTE [] DISCHARGE NOTE    [x] [facility]     Date: 2022  Patient Name:  Miesha Mendiola  : 1960  MRN: [id number]     Referring Practitioner Tracy Rios CNP/Dr. Ángel Ahmadi MD   Diagnosis Malignant neoplasm of upper-outer quadrant of right female breast [C50.411]    Treatment Diagnosis R breast lymphedema   Date of Evaluation 21    Additional Pertinent History thyroid disease, osteoporosis       Functional Outcome Measure Used O: QuickDASH   Functional Outcome Score 4.5% impaired (21)        Insurance: Primary: Payor: Lelo Salt /  /  / ,   Secondary:    Authorization Information: Aquatics and modalities covered   Visit # 15, 6/10 for progress note   Visits Allowed: Unlimited   Recertification Date:    Physician Follow-Up: 22     Physician Orders: Breast lymphedema   History of Present Illness: 20 R IDC ER/NV+, HER2-, 20 R Lumpectomy with SLNB with 0/1 node +, 20 re-excision, completed radiation on 2020, on Arimidex, completed care for R breast lymphedema from 21 - 21      SUBJECTIVE:   2022 The patient presented to the Oncology Rehabilitation clinic on this date and reported, \"I'm feeling so much better. I have been wearing that cherry pitpak inside of my new sports bra and I think it helps\" per patient. TREATMENT   Precautions: -RIGHT UPPER EXTREMITY Lymphedema Risk  -NO BLOOD PRESSURE/DRAW IN RIGHT UE.  -Skin Care precautions (Decrease risk of infection).    Pain: -PAIN LOCATION: Right anterior axillary region and lateral aspect of the right breast.  -PAIN RATIN/10 (During MLD)  -PAIN DESCRIPTION: Sore, tender)    X in shaded column indicates activity completed today   Modalities Parameters/  Location  Notes   -      Manual Therapy Time/Technique  Notes   Manual Lymph Drainage (MLD) Right Upper Extremity: Trunk: Effleurage, Profundus, Terminus, Shoulder Collectors, Axillary Lnn, Anterior AAA (Axillary Anastomosis) (3 Steps), Inguinal Lnn, (AI) Anterior Axillary to Inguinal, Posterior AAA (Axillary Anastomosis) (3 Steps) and Posterior Axillary to Inguinal, Upper Extremity: Upper Arm (Anterior, Posterior, Lateral, Medial to Lateral), Antecubital Fossa (Anterior, Posterior, Lateral, Medial), Forearm (Anterior, Posterior), Wrist/Hand (Anterior, Posterior) and Fingers, Rework: Posterior (AI), Posterior (AA), Anterior (AA), Inguinal Lnn, Axillary Lnn, Shoulder Collectors, Terminus, Profundus and Effleurage X -POSITION: Supine and head of bed slightly elevated and bilateral knees supported on bolster. -PATIENT TOLERANCE: Patient reported moderate tenderness and soreness with light Fibrotic Technique/MLD on the  the right lateral breast and axillary region. Fibrotic Tissue Technique -LOCATION: Right superior/lateral aspect of right breast and anterior axillary border (Right). -AMOUNT OF PRESSURE: Light moderate. -PATIENT TOLERANCE: Patient reported tenderness. X -Goal: Fibrotic Tissue Breakdown/Softening. KT/Elastic tape -     Myofascial techniques to scar tissue at incisional area with manual lymphatic drainage to R breast -     Exercise/Intervention   Notes   -Shoulder flexion  -x 5 reps 10-15 sec. hold X -Supine   -Shoulder horizontal abduction -x 5 reps 10-15 sec. hold X -Supine   -Shoulder internal/external rotation -x 5 reps 10-15 sec. hold X -Supine   -Overhead Pulleys (Ceiling) -      -Seated row-(Dowel marlon) -      -Seated row-(Orange T/band) -        Specific Interventions for Next Treatment:    1) Continue Complete Decongestive Therapy/Manual Lymph Drainage (CDT/MLD). 2) Continued awareness/education to reduce symptoms and provide control over Lymphedema. 3) Continue range of motion/stretching/fibrotic technique/scar tissue mobs.     Activity Tolerance: Patient tolerated todays treatment fair (+) with reports of tenderness during MLD and streching sensations during range of motion. ASSESSMENT  7/14/2022  The patient in agreement with exploring option for sequential compression pump for home use to breakdown the fibrotic tissue. GOALS:  Patient Goal: Get rid of lymphedema - GOAL ONGOING.     Short Term Goals to be met in 12 weeks:  1. See LTGs     Long Term Goals to be met in 12 weeks:   1. Pt to improve QuickDASH score from 4.5% impaired to 0% impaired for improved shoulder function for recreational activities. GOAL NOT MET: 4.6% impaired - notes difficulty with carrying shopping bags and \"ache\" in arm when using her computer - discussed importance of strengthening and to monitor posture with computer use. Continue Goal    2. Pt to demo R shoulder PROM flexion improved from 171 deg to 175 deg for ease with reaching tasks. GOAL MET:  175 deg. Discontinue Goal    3. Pt to demo fullness in R upper back reduced for improved comfort with wearing bra and sleeping. GOAL NOT MET: Improvement but still present. Continue Goal    4. Pt to be independent with HEP for lymphedema management to control her symptoms and reduce the risk of cellulitis. GOAL NOT MET: Updated HEP for strengthening activities this date. .  Continue Goal    Patient Education:   1) Lymphedema Awareness continued with types of Lymphedema, risk and signs of infection, Lymphedema program progression. 2) Reviewed goals and plan of care. Education Outcome: Verbalized understanding  Education Barriers: None    PLAN: Continue established plan of care. (Follow up for re-assessment in approximately 4-5 weeks)    THE PATIENT DEMONSTRATES GOOD POTENTIAL TO MAKE GOOD PROGRESS AND MEET ALL GOALS WITH TREATMENTS COMPLETED BY A SKILLED PHYSICAL THERAPIST/PHYSICAL THERAPIST ASSISTANT. Time In 1503   Time Out 1600   Timed Code Minutes: 57 min   Total Treatment Time: 57 min     THANK YOU FOR ALLOWING US TO ASSIST IN THE CARE AND TREATMENT OF THIS PATIENT! Electronically Signed by: Braden Galan PT, JUAN-KEENA NICHOLAS    7/14/2022

## 2022-07-15 ENCOUNTER — HOSPITAL ENCOUNTER (OUTPATIENT)
Dept: PHYSICAL THERAPY | Age: 62
Setting detail: THERAPIES SERIES
Discharge: HOME | End: 2022-07-15
Payer: COMMERCIAL

## 2022-07-15 PROCEDURE — 97140 MANUAL THERAPY 1/> REGIONS: CPT

## 2022-07-15 PROCEDURE — 97530 THERAPEUTIC ACTIVITIES: CPT

## 2022-07-15 NOTE — PROGRESS NOTES
** PLEASE SIGN, DATE AND TIME CERTIFICATION BELOW AND RETURN TO [facility] OUTPATIENT REHABILITATION (FAX #: [phone]). ATTEST/CO-SIGN IF ACCESSING VIA [employer]. THANK YOU.**    I certify that I have examined the patient below and determined that Physical Medicine and Rehabilitation service is necessary and that I approve the established plan of care for up to 90 days or as specifically noted.   Attestation, signature or co-signature of physician indicates approval of certification requirements.    ________________________ ____________ __________  Physician Signature   Date   Time    [facility]  PHYSICAL THERAPY  [] DAILY NOTE [x] PROGRESS NOTE [] DISCHARGE NOTE    [x] [facility]     Date: 7/15/2022  Patient Name:  Pamela Carmona  : 1960  MRN: [id number]     Referring Practitioner Marciano Torres CNP/Dr. Emmie Baires MD   Diagnosis Malignant neoplasm of upper-outer quadrant of right female breast [C50.411]    Treatment Diagnosis R breast lymphedema   Date of Evaluation 21    Additional Pertinent History thyroid disease, osteoporosis       Functional Outcome Measure Used O: QuickDASH   Functional Outcome Score 4.5% impaired (21)        Insurance: Primary: Payor: [employer] 150 /  /  / ,   Secondary:    Authorization Information: Aquatics and modalities covered   Visit # 16, 7/10 for progress note   Visits Allowed: Unlimited   Recertification Date: 66/10/5190 (8 weeks)   Physician Follow-Up: 22     Physician Orders: Breast lymphedema   History of Present Illness: 20 R IDC ER/OH+, HER2-, 20 R Lumpectomy with SLNB with 0/1 node +, 20 re-excision, completed radiation on 2020, on Arimidex, completed care for R breast lymphedema from 21 - 21      SUBJECTIVE:   7/15/2022 The patient is a pleasant and motivated 57year old female who has undergone Physical Therapy treatments in Oncology Rehab and Lymphedema treatments. The patient reported that she is very pleased with the treatment results. However, she notices a difference when she has not had her in clinic treatment and is trying to manage it on her own. \"I feel like I need a little help to keep the swelling down at home\" per patient. Elevation: Minimal decrease in swelling of upper extremity, increased numbness. Diet: Increased water intact. Exercises: Daily Home Exercise and MLD program completed daily. OBJECTIVE  Circumferential Measurements:    Right (Baseline)  (21) Right   (22) Right  (22) Right  (07/15/22)   Metacarpal Heads 17.6 cm (+) 17.8 cm 17.4 16.6    Ulnar Styloid Process 13.7 cm (+) 14.7 cm 14.6 13.8   10 cm Distal to Lateral  Epicondyle 21.6 cm (+) 22.1 cm (+) 22.7 22.4   Elbow 22.0 cm (+) 22.8 cm (+) 23.4 21.7   10 cm Proximal to Lateral  Epicondyle 28.9 cm (-) 27.8 cm (+) 28.9 27.9   Axillary Crease 30.2 cm (-)28.7 cm 28.2 27.9   TOTAL 134.0 cm (-) 133.9 cm (+) 135.2 (-) 130.3 cm     Hyperkeratosis: None  Papillomas: None  Open Wounds: None  Hyperpigmentation: Minimal Right Upper Extremity and Breast/Truncal region. TREATMENT   Precautions: -RIGHT UPPER EXTREMITY Lymphedema Risk  -NO BLOOD PRESSURE/DRAW IN RIGHT UE.  -Skin Care precautions (Decrease risk of infection). Pain: -PAIN LOCATION: Right anterior axillary region and lateral aspect of the right breast.  -PAIN RATIN/10   -PAIN DESCRIPTION: Sore, tight. X in shaded column indicates activity completed today   Modalities Parameters/  Location  Notes   -      Manual Therapy Time/Technique  Notes   Manual Lymph Drainage (MLD) Right Upper Extremity: Trunk:  Refer to Pump Demo , Upper Extremity:  Refer to Pump Demo , Rework:  Refer to Pump Demo  -POSITION: Supine and head of bed slightly elevated and bilateral knees supported on bolster.      -PATIENT TOLERANCE: Patient reported moderate tenderness and soreness with light Fibrotic Technique/MLD on the  the right lateral breast and axillary region. Fibrotic Tissue Technique -  -Goal: Fibrotic Tissue Breakdown/Softening. KT/Elastic tape -     Myofascial techniques to scar tissue at incisional area with manual lymphatic drainage to R breast -     Exercise/Intervention   Notes   -Shoulder flexion  -   -Supine   -Shoulder horizontal abduction -   -Supine   -Shoulder internal/external rotation -   -Supine   -Overhead Pulleys (Ceiling) -      -Seated row-(Dowel marlon) -      -Seated row-(Orange T/band) -      SEQUENTIAL COMPRESSION PUMP TRIAL  -BRAND: [employer]zing)  -TYPE: 8 chamber Basic sequential compression pump. -LOCATION: Right upper extremity. -DURATION: 30 out of 30 minutes (Total)  -PRESSURE (mm Hg): 20-30 mm Hg (Low)  -PATIENT TOLERANCE OF PUMP TRIAL: Patient reported numbness in right hand/fingers during use of pump trial.  LOCATION:  PRE-TRIAL (cm) POST TRIAL (cm)   Biceps 27.9 28.1 (+)   Elbow Crease 21.7 21.9 (+)   Forearm 22.4 22.3 (-)   Wrist 13.8 13.9 (+)   Metacarpal heads 16.6 16.0 (-)     -COMMENT: Noted an increase in swelling as noted by the increase in swelling (proximally) measurements noted in chart above. The patient also reported a development of numbness in the right hand during the sequential compression pump treatment, which got progressively worse with increased treatment time. Therefore, highly recommend that the patient would benefit from an advanced sequential compression pump (Flexitouch) to assist with managing the truncal and upper extremity Lymphedema swelling. Specific Interventions for Next Treatment:    1) Continue Complete Decongestive Therapy/Manual Lymph Drainage (CDT/MLD). 2) Continued awareness/education to reduce symptoms and provide control over Lymphedema. 3) Continue range of motion/stretching/fibrotic technique/scar tissue mobs.     ASSESSMENT  Activity Tolerance:   7/15/2022The patient is a pleasant and cooperative 57year old female who has made good progress with the Complete Decongestive Therapy/Manual Lymph Drainage (CDT/MLD) and Oncology Rehabilitation treatments as noted by the followin) The patient has demonstrated a decrease in total circumferential measurements in comparison to previous measurements by 3.7 cm in total circumferential measurements from the right upper extremity. 2) The patient has met 1/4 long term goals. 3) The patient has demonstrated a decrease in firm/fibrotic tissue from Moderate to Minimal fibrotic tissue/tissue firmness as noted by the increase in skin stretch during MLD and by softened tissue texture. 4) The patient has demonstrated decreased pain/discomfort from 3 to 2.   5) The patient has demonstrated decreased dry skin. The patient has demonstrated progress with treatments, however, she is in need of proper equipment to be able to manage and keep the swelling down. The patient is very motivated and compliant with her home drainage and exercise program. However, she would benefit highly from an advanced sequential compression pump. The patient has attempted a basic sequential compression pump demo, however developed pain symptoms including numbness (progressive) in the right upper extremity and increased swelling proximally in the upper arm and truncal region. GOALS:  Patient Goal: Get rid of lymphedema - GOAL ONGOING. Short Term Goals to be met in 12 weeks:  See LTGs     Long Term Goals to be met in 12 weeks:   Pt to improve QuickDASH score from 4.5% impaired to 0% impaired for improved shoulder function for recreational activities. GOAL NOT MET: 4.6% impaired - notes difficulty with carrying shopping bags and \"ache\" in arm when using her computer - discussed importance of strengthening and to monitor posture with computer use. Continue Goal    Pt to demo R shoulder PROM flexion improved from 171 deg to 175 deg for ease with reaching tasks. GOAL MET:  175 deg.   Discontinue Goal    Pt to demo fullness in R upper back reduced for improved comfort with wearing bra and sleeping. GOAL NOT MET: Improvement but still present. Continue Goal    Pt to be independent with HEP for lymphedema management to control her symptoms and reduce the risk of cellulitis. GOAL NOT MET: Updated HEP for strengthening activities this date. .  Continue Goal    Patient Education:   1) Lymphedema Awareness continued with types of Lymphedema, risk and signs of infection, Lymphedema program progression. 2) Reviewed goals and plan of care. Education Outcome: Verbalized understanding  Education Barriers: None    PLAN: Continue established plan of care. (Recommend 1x/wk for 8 weeks). THE PATIENT DEMONSTRATES GOOD POTENTIAL TO MAKE GOOD PROGRESS AND MEET ALL GOALS WITH TREATMENTS COMPLETED BY A SKILLED PHYSICAL THERAPIST/PHYSICAL THERAPIST ASSISTANT. Time In 1005   Time Out 1110   Timed Code Minutes: 65 min   Total Treatment Time: 65 min     THANK YOU FOR ALLOWING US TO ASSIST IN THE CARE AND TREATMENT OF THIS PATIENT!      Electronically Signed by: Casie Lorenzo PT, KEENA NORMAN    7/15/2022

## 2022-07-20 ENCOUNTER — HOSPITAL ENCOUNTER (OUTPATIENT)
Dept: PHYSICAL THERAPY | Age: 62
Setting detail: THERAPIES SERIES
Discharge: HOME | End: 2022-07-20
Payer: COMMERCIAL

## 2022-07-20 PROCEDURE — 97140 MANUAL THERAPY 1/> REGIONS: CPT

## 2022-07-20 PROCEDURE — 97530 THERAPEUTIC ACTIVITIES: CPT

## 2022-07-20 NOTE — PROGRESS NOTES
[facility]  ONCOLOGY REHABILITATION  PHYSICAL THERAPY  [x] DAILY NOTE [] PROGRESS NOTE [] DISCHARGE NOTE    [x] [facility]     Date: 2022  Patient Name:  Judy Felipe  : 1960  MRN: [id number]     Referring Practitioner Sol Hilliard CNP/Dr. Car Roman MD   Diagnosis Malignant neoplasm of upper-outer quadrant of right female breast [C50.411]    Treatment Diagnosis R breast lymphedema   Date of Evaluation 21    Additional Pertinent History thyroid disease, osteoporosis       Functional Outcome Measure Used O: QuickDASH   Functional Outcome Score 4.5% impaired (21)        Insurance: Primary: Payor: [employer] 150 /  /  / ,   Secondary:    Authorization Information: Aquatics and modalities covered   Visit # 17, 8/10 for progress note   Visits Allowed: Unlimited   Recertification Date:  (8 weeks)   Physician Follow-Up: 22     Physician Orders: Breast lymphedema   History of Present Illness: 20 R IDC ER/HI+, HER2-, 20 R Lumpectomy with SLNB with 0/1 node +, 20 re-excision, completed radiation on 2020, on Arimidex, completed care for R breast lymphedema from 21 - 21      SUBJECTIVE:   2022 The patient presented to the Lymphema/PT clinic on this date without reports in changes in symptoms or medical status. Elevation: Minimal decrease in swelling of upper extremity, increased numbness. Diet: Increased water intact. Exercises: Daily Home Exercise and MLD program completed daily. TREATMENT   Precautions: -RIGHT UPPER EXTREMITY Lymphedema Risk  -NO BLOOD PRESSURE/DRAW IN RIGHT UE.  -Skin Care precautions (Decrease risk of infection). Pain: -PAIN LOCATION: Right anterior axillary region and lateral aspect of the right breast.  -PAIN RATIN/10   -PAIN DESCRIPTION: Sore, tight.     X in shaded column indicates activity completed today   Modalities Parameters/  Location  Notes   -      Manual Therapy Time/Technique  Notes   Manual Lymph Drainage (MLD) Right Upper Extremity: Trunk:  Refer to Pump Demo , Upper Extremity:  Refer to Pump Demo , Rework:  Refer to Pump Demo  -POSITION: Supine and head of bed slightly elevated and bilateral knees supported on bolster. -PATIENT TOLERANCE: Patient reported moderate tenderness and soreness with light Fibrotic Technique/MLD on the  the right lateral breast and axillary region. Fibrotic Tissue Technique -  -Goal: Fibrotic Tissue Breakdown/Softening. KT/Elastic tape -     Myofascial techniques to scar tissue at incisional area with manual lymphatic drainage to R breast -     Exercise/Intervention   Notes   -Shoulder flexion  -   -Supine   -Shoulder horizontal abduction -   -Supine   -Shoulder internal/external rotation -   -Supine   -Overhead Pulleys (Ceiling) -      -Seated row-(Dowel marlon) -      -Seated row-(Orange T/band) -      SEQUENTIAL COMPRESSION PUMP DEMO  -BRAND: [employer] Beis[employer] (Flexitouch)  -TYPE: 32 chambers advanced sequential compression pump. -LOCATION: Right upper extremity/truncal/right upper quadrant. -DURATION: 42 out of 60 minutes (Total)  -PRESSURE (mm Hg): 20-30 mm Hg (Normal)  -PATIENT TOLERANCE OF PUMP TRIAL: Patient denied pain/tenderness during/following advanced sequential compression pump demo. \"It really feels very good, I wish it would do a little more of the treatment under my right breast where a lot of the firm tissue is, but it was good for the rest of my right breast.\" Per patient. Therapist discussed different options for increasing compression in right upper quadrant region including but not limited to: adding a small chip/cherry pitpak under the pump garment or apply a compression pump strap to tighten and allow increased pressure to the specific area. Specific Interventions for Next Treatment:    1) Continue Complete Decongestive Therapy/Manual Lymph Drainage (CDT/MLD).   2) Continue range of motion/stretching/fibrotic technique/scar tissue mobs. 3) Measurements/assessment for possible discharge during follow up appointment. ASSESSMENT  Activity Tolerance:   7/20/2022Refer to \"Sequential compression pump demo\" above. Patient denied pain/tenderness following Flexitouch demo. Patient provided verbal approval to submit information to [employer] for insurance approval for Flexitouch. She would not benefit from a basic compression pump due to truncal swelling and increased tingling in right hand during the Basic pump demo during the last treatment session. GOALS:  Patient Goal: Get rid of lymphedema - GOAL ONGOING. Short Term Goals to be met in 12 weeks:  See LTGs     Long Term Goals to be met in 12 weeks:   Pt to improve QuickDASH score from 4.5% impaired to 0% impaired for improved shoulder function for recreational activities. GOAL NOT MET: 4.6% impaired - notes difficulty with carrying shopping bags and \"ache\" in arm when using her computer - discussed importance of strengthening and to monitor posture with computer use. Continue Goal    Pt to demo R shoulder PROM flexion improved from 171 deg to 175 deg for ease with reaching tasks. GOAL MET:  175 deg. Discontinue Goal    Pt to demo fullness in R upper back reduced for improved comfort with wearing bra and sleeping. GOAL NOT MET: Improvement but still present. Continue Goal    Pt to be independent with HEP for lymphedema management to control her symptoms and reduce the risk of cellulitis. GOAL NOT MET: Updated HEP for strengthening activities this date. .  Continue Goal    Patient Education:   1) Lymphedema Awareness continued with types of Lymphedema, risk and signs of infection, Lymphedema program progression. 2) Reviewed goals and plan of care. Education Outcome: Verbalized understanding  Education Barriers: None    PLAN: Continue established plan of care. (Recommend 1x/wk for 8 weeks).     THE PATIENT DEMONSTRATES GOOD POTENTIAL TO MAKE GOOD PROGRESS AND MEET ALL GOALS WITH TREATMENTS COMPLETED BY A SKILLED PHYSICAL THERAPIST/PHYSICAL THERAPIST ASSISTANT. Time In 0735   Time Out 0840   Timed Code Minutes: 65 min   Total Treatment Time: 65 min     THANK YOU FOR ALLOWING US TO ASSIST IN THE CARE AND TREATMENT OF THIS PATIENT!      Electronically Signed by: Rico Negro PT, JAUN-CRISTOPHER, KEENA    7/20/2022

## 2022-07-25 ENCOUNTER — OFFICE VISIT (OUTPATIENT)
Dept: ONCOLOGY | Age: 62
End: 2022-07-25
Payer: COMMERCIAL

## 2022-07-25 ENCOUNTER — HOSPITAL ENCOUNTER (OUTPATIENT)
Dept: INFUSION THERAPY | Age: 62
Discharge: HOME | End: 2022-07-25
Payer: COMMERCIAL

## 2022-07-25 VITALS
SYSTOLIC BLOOD PRESSURE: 126 MMHG | WEIGHT: 128.4 LBS | OXYGEN SATURATION: 99 % | TEMPERATURE: 98.1 F | BODY MASS INDEX: 24.24 KG/M2 | HEART RATE: 76 BPM | HEIGHT: 61 IN | DIASTOLIC BLOOD PRESSURE: 72 MMHG

## 2022-07-25 VITALS
DIASTOLIC BLOOD PRESSURE: 72 MMHG | OXYGEN SATURATION: 99 % | SYSTOLIC BLOOD PRESSURE: 126 MMHG | TEMPERATURE: 98.1 F | HEART RATE: 76 BPM

## 2022-07-25 DIAGNOSIS — Z17.1 CARCINOMA OF UPPER-OUTER QUADRANT OF RIGHT BREAST IN FEMALE, ESTROGEN RECEPTOR NEGATIVE (HCC): ICD-10-CM

## 2022-07-25 DIAGNOSIS — Z98.890 S/P LUMPECTOMY, RIGHT BREAST: Primary | ICD-10-CM

## 2022-07-25 DIAGNOSIS — C50.411 CARCINOMA OF UPPER-OUTER QUADRANT OF RIGHT BREAST IN FEMALE, ESTROGEN RECEPTOR NEGATIVE (HCC): ICD-10-CM

## 2022-07-25 PROCEDURE — 99211 OFF/OP EST MAY X REQ PHY/QHP: CPT

## 2022-07-25 PROCEDURE — 99215 OFFICE O/P EST HI 40 MIN: CPT | Performed by: INTERNAL MEDICINE

## 2022-07-25 ASSESSMENT — ENCOUNTER SYMPTOMS
SHORTNESS OF BREATH: 0
TROUBLE SWALLOWING: 0
RHINORRHEA: 0
NAUSEA: 0
VOMITING: 0
COUGH: 0
ANAL BLEEDING: 0
BLOOD IN STOOL: 0

## 2022-07-25 NOTE — PATIENT INSTRUCTIONS
Discussed patient scheduling her mammogram. Order placed. Reminded patient  that she is due for her Dexa scan in November. She will continue her Arimidex. Return to see MD in 3 months.

## 2022-07-25 NOTE — PROGRESS NOTES
the supraspinatus tendon. No associated indeterminate 4 mm T1 hypointense and T2 hyperintense focus in the proximal humeral diaphysis. It was said that given the history of a new malignancy bone scan should be considered. Bone scan was performed on 7/8/2022 and showed no evidence of osseous metastatic disease. There was probable degenerative arthropathic change. The patient is doing well with her hormonal manipulation. She says that she deals with it. She has had hair loss and dry skin which she also attributes to the aromatase inhibitor. She gets weightbearing exercise and takes vitamin D but not calcium when she gets through her diet. She has had no vaginal bleeding and is aware of no lumps bumps or other signs of progressive disease. MONITORING PARAMETERS    Mammography, bone scans, MRI. PAST MEDICAL HISTORY  Past Medical History:   Diagnosis Date    Acquired autoimmune hypothyroidism     Breast cancer ([facility] [address] 08/2020    Diverticulitis     Dr. Boni García    History of kidney stones     History of therapeutic radiation     oct. 2020    Intestinal metaplasia of gastric mucosa         Osteopenia 2006        REVIEW OF SYSTEMS  Review of Systems   Constitutional:  Negative for activity change, appetite change, chills, fatigue, fever and unexpected weight change. HENT:  Negative for dental problem, hearing loss, mouth sores, nosebleeds, rhinorrhea and trouble swallowing. Eyes:  Negative for visual disturbance. Respiratory:  Negative for cough and shortness of breath. Cardiovascular:  Negative for chest pain and palpitations. Gastrointestinal:  Negative for anal bleeding, blood in stool, nausea and vomiting. Endocrine: Negative for polyuria. Genitourinary:  Negative for dysuria, frequency, hematuria, vaginal bleeding and vaginal discharge. Musculoskeletal:  Positive for myalgias (shoulder pain, Moved her house in the last few months. Injury. ). Negative for gait problem and neck pain. Skin: Negative. Neurological:  Negative for dizziness, seizures, syncope and headaches. Hematological:  Does not bruise/bleed easily. Psychiatric/Behavioral:  Negative for confusion, dysphoric mood, sleep disturbance and suicidal ideas.        FAMILY HISTORY  Family History   Problem Relation Age of Onset    Other Mother         fuches corneal dystrophy, scleroderma, osteoporosis    High Blood Pressure Mother         pulmonary htn    High Blood Pressure Father     Cancer Father         lung    Diabetes Maternal Grandmother     Cancer Maternal Grandmother         pancreatic    Other Maternal Grandfather         fuches corneal dystrophy    Other Sister         fuches corneal dystrophy    Other Sister         fuches corneal dystrophy    Heart Disease Sister         pacemaker    Other Sister         fuches corneal dystrophy, hypothyroidism    Other Sister         hypothyroidism, reynauds        SOCIAL HISTORY  Social History     Socioeconomic History    Marital status:      Spouse name: Kyree Brooks    Number of children: 2    Years of education: Not on file    Highest education level: Not on file   Occupational History    Not on file   Tobacco Use    Smoking status: Former     Packs/day: 1.00     Years: 20.00     Pack years: 20.00     Types: Cigarettes     Quit date: 1997     Years since quittin.5    Smokeless tobacco: Never   Vaping Use    Vaping Use: Never used   Substance and Sexual Activity    Alcohol use: No    Drug use: No    Sexual activity: Not on file   Other Topics Concern    Not on file   Social History Narrative    Not on file     Social Determinants of Health     Financial Resource Strain: Not on file   Food Insecurity: Not on file   Transportation Needs: Not on file   Physical Activity: Not on file   Stress: Not on file   Social Connections: Not on file   Intimate Partner Violence: Not on file   Housing Stability: Not on file        CURRENT MEDICATIONS  Current Outpatient Medications Medication Sig Dispense Refill    anastrozole (ARIMIDEX) 1 MG tablet Take 1 tablet by mouth daily 30 tablet 11    Cholecalciferol 400 UNIT TABS tablet Take 2,000 Units by mouth in the morning. thyroid (ARMOUR) 15 MG tablet Take 15 mg by mouth daily Alternate every other day with 30 mg       No current facility-administered medications for this visit. OARRS    PDMP Monitoring: She took some tramadol a couple of years ago but there are no new prescriptions. PDMP Monitoring:    Last PDMP Madhav Crews as Reviewed [facility]):  Review User Review Instant Review Result   Manual Chantell 7/26/2022 12:50 AM Reviewed PDMP [1]       Urine Drug Screenings (1 yr)    No resulted procedures found. Medication Contract and Consent for Opioid Use Documents Filed        No documents found                         PHYSICAL EXAM    Physical Exam  Vitals and nursing note reviewed. Exam conducted with a chaperone present. Constitutional:       General: She is not in acute distress. Appearance: Normal appearance. She is normal weight. She is not ill-appearing, toxic-appearing or diaphoretic. HENT:      Head: Normocephalic and atraumatic. Nose: Nose normal.      Mouth/Throat:      Mouth: Mucous membranes are dry. Pharynx: Oropharynx is clear. No oropharyngeal exudate or posterior oropharyngeal erythema. Eyes:      General: No scleral icterus. Extraocular Movements: Extraocular movements intact. Conjunctiva/sclera: Conjunctivae normal.      Pupils: Pupils are equal, round, and reactive to light. Cardiovascular:      Rate and Rhythm: Normal rate and regular rhythm. Pulses: Normal pulses. Heart sounds: Normal heart sounds. No murmur heard. Pulmonary:      Effort: Pulmonary effort is normal. No respiratory distress. Breath sounds: Normal breath sounds. No stridor. No wheezing, rhonchi or rales. Chest:      Chest wall: No tenderness. Breasts:     Right: No swelling, mass or nipple discharge. Left: No swelling, mass or nipple discharge. Comments: Right is slightly smaller than the left breast. Well healed surgical scar in right upper outer quadrant. Abdominal:      General: Abdomen is flat. Bowel sounds are normal.      Palpations: Abdomen is soft. There is no mass. Tenderness: There is no abdominal tenderness. There is no guarding. Hernia: No hernia is present. Musculoskeletal:         General: Normal range of motion. Cervical back: Normal range of motion and neck supple. No rigidity or tenderness. Right lower leg: No edema. Left lower leg: No edema. Lymphadenopathy:      Cervical: No cervical adenopathy. Skin:     General: Skin is warm and dry. Coloration: Skin is not jaundiced or pale. Findings: No bruising, erythema, lesion or rash. Comments: Small 2 mm raised area on left shoulder. Non-tender. Neurological:      General: No focal deficit present. Mental Status: She is alert and oriented to person, place, and time. Mental status is at baseline. Cranial Nerves: No cranial nerve deficit. Motor: No weakness. Gait: Gait normal.   Psychiatric:         Mood and Affect: Mood normal.         Behavior: Behavior normal.         Thought Content: Thought content normal.         Judgment: Judgment normal.        LABS/IMAGING  NM BONE SCAN WHOLE BODY    Result Date: 7/8/2022  1. No scintigraphic evidence of osseous metastatic disease. 2. Probable degenerative arthropathic changes as detailed above. Final report electronically signed by Dr. Carrie Rowe on 7/8/2022 1:29 PM         PATHOLOGY/GENETICS    Infiltrating ductal carcinoma. Today we discussed the possibility of genetic counseling due to several family members having pancreatic cancer and the association with the BRCA1 and BRCA2 genes. This was referred to the breast cancer navigators. ASSESSMENT and PLAN    1.   History of breast cancer for which she is continuing on Arimidex. She will continue for total of 5 years. She is to have her mammogram soon. She will have a DEXA scan in November. She will return to the office in 3 months time or sooner should she need to do this. 2.  Osteoporosis. She refused Prolia and bisphosphonates. She takes vitamin D but not calcium supplementation. We will check her DEXA scan and see with the status of her bone health is. 3.  Recent injury to her right shoulder. I think that the imaging studies are reassuring and reassured the patient in turn. The patient knows to call for any change in her status, questions or concerns.         Reza Barber MD 7/26/2022 12:50 AM

## 2022-08-01 RX ORDER — ANASTROZOLE 1 MG/1
1 TABLET ORAL DAILY
Qty: 30 TABLET | Refills: 11 | Status: SHIPPED | OUTPATIENT
Start: 2022-08-01

## 2022-08-03 ENCOUNTER — HOSPITAL ENCOUNTER (OUTPATIENT)
Dept: WOMENS IMAGING | Age: 62
Discharge: HOME | End: 2022-08-03
Payer: COMMERCIAL

## 2022-08-03 DIAGNOSIS — Z12.31 VISIT FOR SCREENING MAMMOGRAM: ICD-10-CM

## 2022-08-03 DIAGNOSIS — Z17.1 CARCINOMA OF UPPER-OUTER QUADRANT OF RIGHT BREAST IN FEMALE, ESTROGEN RECEPTOR NEGATIVE (HCC): ICD-10-CM

## 2022-08-03 DIAGNOSIS — C50.411 CARCINOMA OF UPPER-OUTER QUADRANT OF RIGHT BREAST IN FEMALE, ESTROGEN RECEPTOR NEGATIVE (HCC): ICD-10-CM

## 2022-08-03 DIAGNOSIS — Z98.890 S/P LUMPECTOMY, RIGHT BREAST: ICD-10-CM

## 2022-08-03 PROCEDURE — 77063 BREAST TOMOSYNTHESIS BI: CPT

## 2022-08-10 ENCOUNTER — APPOINTMENT (OUTPATIENT)
Dept: CT IMAGING | Age: 62
End: 2022-08-10
Payer: COMMERCIAL

## 2022-08-10 ENCOUNTER — APPOINTMENT (OUTPATIENT)
Dept: GENERAL RADIOLOGY | Age: 62
End: 2022-08-10
Payer: COMMERCIAL

## 2022-08-10 ENCOUNTER — HOSPITAL ENCOUNTER (EMERGENCY)
Age: 62
Discharge: HOME | End: 2022-08-10
Payer: COMMERCIAL

## 2022-08-10 VITALS
DIASTOLIC BLOOD PRESSURE: 69 MMHG | RESPIRATION RATE: 16 BRPM | HEART RATE: 95 BPM | TEMPERATURE: 98.3 F | OXYGEN SATURATION: 98 % | SYSTOLIC BLOOD PRESSURE: 111 MMHG

## 2022-08-10 DIAGNOSIS — U07.1 COVID-19: ICD-10-CM

## 2022-08-10 DIAGNOSIS — R55 SYNCOPE AND COLLAPSE: Primary | ICD-10-CM

## 2022-08-10 LAB
ALBUMIN SERPL-MCNC: 4.4 G/DL (ref 3.5–5.1)
ALP BLD-CCNC: 112 U/L (ref 38–126)
ALT SERPL-CCNC: 24 U/L (ref 11–66)
ANION GAP SERPL CALCULATED.3IONS-SCNC: 11 MEQ/L (ref 8–16)
AST SERPL-CCNC: 19 U/L (ref 5–40)
BASOPHILS # BLD: 0.2 %
BASOPHILS ABSOLUTE: 0 THOU/MM3 (ref 0–0.1)
BILIRUB SERPL-MCNC: 0.6 MG/DL (ref 0.3–1.2)
BILIRUBIN DIRECT: < 0.2 MG/DL (ref 0–0.3)
BUN BLDV-MCNC: 18 MG/DL (ref 7–22)
CALCIUM SERPL-MCNC: 9.9 MG/DL (ref 8.5–10.5)
CHLORIDE BLD-SCNC: 101 MEQ/L (ref 98–111)
CO2: 24 MEQ/L (ref 23–33)
CREAT SERPL-MCNC: 0.5 MG/DL (ref 0.4–1.2)
EOSINOPHIL # BLD: 0 %
EOSINOPHILS ABSOLUTE: 0 THOU/MM3 (ref 0–0.4)
ERYTHROCYTE [DISTWIDTH] IN BLOOD BY AUTOMATED COUNT: 12.4 % (ref 11.5–14.5)
ERYTHROCYTE [DISTWIDTH] IN BLOOD BY AUTOMATED COUNT: 44.5 FL (ref 35–45)
FLU A ANTIGEN: NEGATIVE
FLU B ANTIGEN: NEGATIVE
GFR SERPL CREATININE-BSD FRML MDRD: > 90 ML/MIN/1.73M2
GLUCOSE BLD-MCNC: 120 MG/DL (ref 70–108)
HCT VFR BLD CALC: 42 % (ref 37–47)
HEMOGLOBIN: 13.7 GM/DL (ref 12–16)
IMMATURE GRANS (ABS): 0.02 THOU/MM3 (ref 0–0.07)
IMMATURE GRANULOCYTES: 0.3 %
LIPASE: 19.1 U/L (ref 5.6–51.3)
LYMPHOCYTES # BLD: 6 %
LYMPHOCYTES ABSOLUTE: 0.3 THOU/MM3 (ref 1–4.8)
MCH RBC QN AUTO: 31.9 PG (ref 26–33)
MCHC RBC AUTO-ENTMCNC: 32.6 GM/DL (ref 32.2–35.5)
MCV RBC AUTO: 97.7 FL (ref 81–99)
MONOCYTES # BLD: 5.9 %
MONOCYTES ABSOLUTE: 0.3 THOU/MM3 (ref 0.4–1.3)
NUCLEATED RED BLOOD CELLS: 0 /100 WBC
OSMOLALITY CALCULATION: 275.1 MOSMOL/KG (ref 275–300)
PLATELET # BLD: 202 THOU/MM3 (ref 130–400)
PMV BLD AUTO: 9.1 FL (ref 9.4–12.4)
POTASSIUM REFLEX MAGNESIUM: 4.4 MEQ/L (ref 3.5–5.2)
PRO-BNP: 129.5 PG/ML (ref 0–900)
RBC # BLD: 4.3 MILL/MM3 (ref 4.2–5.4)
SARS-COV-2, NAAT: DETECTED
SEG NEUTROPHILS: 87.6 %
SEGMENTED NEUTROPHILS ABSOLUTE COUNT: 5.1 THOU/MM3 (ref 1.8–7.7)
SODIUM BLD-SCNC: 136 MEQ/L (ref 135–145)
TOTAL PROTEIN: 7.8 G/DL (ref 6.1–8)
TROPONIN T: < 0.01 NG/ML
WBC # BLD: 5.8 THOU/MM3 (ref 4.8–10.8)

## 2022-08-10 PROCEDURE — 70486 CT MAXILLOFACIAL W/O DYE: CPT

## 2022-08-10 PROCEDURE — 85025 COMPLETE CBC W/AUTO DIFF WBC: CPT

## 2022-08-10 PROCEDURE — 6370000000 HC RX 637 (ALT 250 FOR IP): Performed by: NURSE PRACTITIONER

## 2022-08-10 PROCEDURE — 87804 INFLUENZA ASSAY W/OPTIC: CPT

## 2022-08-10 PROCEDURE — 71045 X-RAY EXAM CHEST 1 VIEW: CPT

## 2022-08-10 PROCEDURE — 80076 HEPATIC FUNCTION PANEL: CPT

## 2022-08-10 PROCEDURE — 84484 ASSAY OF TROPONIN QUANT: CPT

## 2022-08-10 PROCEDURE — 83690 ASSAY OF LIPASE: CPT

## 2022-08-10 PROCEDURE — 93005 ELECTROCARDIOGRAM TRACING: CPT | Performed by: NURSE PRACTITIONER

## 2022-08-10 PROCEDURE — 87635 SARS-COV-2 COVID-19 AMP PRB: CPT

## 2022-08-10 PROCEDURE — 99285 EMERGENCY DEPT VISIT HI MDM: CPT

## 2022-08-10 PROCEDURE — 83880 ASSAY OF NATRIURETIC PEPTIDE: CPT

## 2022-08-10 PROCEDURE — 80048 BASIC METABOLIC PNL TOTAL CA: CPT

## 2022-08-10 PROCEDURE — 36415 COLL VENOUS BLD VENIPUNCTURE: CPT

## 2022-08-10 PROCEDURE — 70450 CT HEAD/BRAIN W/O DYE: CPT

## 2022-08-10 RX ORDER — ONDANSETRON 4 MG/1
4 TABLET, ORALLY DISINTEGRATING ORAL EVERY 8 HOURS PRN
Qty: 20 TABLET | Refills: 0 | Status: SHIPPED | OUTPATIENT
Start: 2022-08-10 | End: 2022-10-25 | Stop reason: ALTCHOICE

## 2022-08-10 RX ORDER — ONDANSETRON 4 MG/1
4 TABLET, ORALLY DISINTEGRATING ORAL ONCE
Status: COMPLETED | OUTPATIENT
Start: 2022-08-10 | End: 2022-08-10

## 2022-08-10 RX ORDER — BENZONATATE 100 MG/1
100 CAPSULE ORAL 3 TIMES DAILY PRN
Qty: 30 CAPSULE | Refills: 0 | Status: SHIPPED | OUTPATIENT
Start: 2022-08-10 | End: 2022-08-17

## 2022-08-10 RX ADMIN — ONDANSETRON 4 MG: 4 TABLET, ORALLY DISINTEGRATING ORAL at 15:30

## 2022-08-10 ASSESSMENT — ENCOUNTER SYMPTOMS
RHINORRHEA: 0
VOMITING: 0
VOICE CHANGE: 0
NAUSEA: 0
TROUBLE SWALLOWING: 0
EYE PAIN: 0
SORE THROAT: 1
CHEST TIGHTNESS: 0
SHORTNESS OF BREATH: 0
SINUS PRESSURE: 1
BACK PAIN: 0
SINUS PAIN: 0
BLOOD IN STOOL: 0
WHEEZING: 0
CONSTIPATION: 0
COUGH: 1
ABDOMINAL PAIN: 0
DIARRHEA: 0

## 2022-08-10 ASSESSMENT — PAIN DESCRIPTION - DESCRIPTORS: DESCRIPTORS: PRESSURE

## 2022-08-10 ASSESSMENT — PAIN SCALES - GENERAL
PAINLEVEL_OUTOF10: 4
PAINLEVEL_OUTOF10: 4

## 2022-08-10 ASSESSMENT — PAIN DESCRIPTION - LOCATION
LOCATION: FACE
LOCATION: FACE

## 2022-08-10 ASSESSMENT — PAIN DESCRIPTION - FREQUENCY: FREQUENCY: CONTINUOUS

## 2022-08-10 ASSESSMENT — PAIN DESCRIPTION - PAIN TYPE: TYPE: ACUTE PAIN

## 2022-08-10 ASSESSMENT — PAIN - FUNCTIONAL ASSESSMENT
PAIN_FUNCTIONAL_ASSESSMENT: 0-10
PAIN_FUNCTIONAL_ASSESSMENT: 0-10

## 2022-08-10 NOTE — ED NOTES
Patient resting in bed. Respirations easy and unlabored. No distress noted. Call light within reach.        Naty Bradley RN  08/10/22 [id number]

## 2022-08-10 NOTE — DISCHARGE INSTRUCTIONS
Differential diagnosis include not limited to COVID-19, influenza or other viral illness. Patient does test positive for COVID-19. Patient will be discharged home with symptomatic treatment. Patient follow-up with primary care provider. Patient instructed to return to ER for worsening symptoms, chest pain, inability to keep liquids down, inability to urinate for greater than 8 hours or difficulty breathing. Follow-up with your primary care provider. Check pulse ox 4 times a day and return to ER for oxygen saturation less than 92%. May take Tylenol or ibuprofen as needed for pain or fever.

## 2022-08-10 NOTE — ED NOTES
Bedside report taken from Radha Fontaine, this nurse assuming care  No new complaints at this time  Pt states she does not want an IV, provider informed   Will continue to monitor      Savita Plata, RN  08/10/22 6429

## 2022-08-10 NOTE — ED PROVIDER NOTES
[address] [facility] [address] [address] EMERGENCY DEPT  EMERGENCY MEDICINE     Pt Name: Kelly West  MRN: [id number]  [place] 1960  Date of evaluation: 8/10/2022  PCP:    Tia Griffin DO  Provider: WILIAN Burns CNP    CHIEF COMPLAINT       Chief Complaint   Patient presents with    Loss of Consciousness           HISTORY OF PRESENT ILLNESS    Kelly West is a 57 y.o. female with breast cancer, hypothyroidism, that presents to ER with loss of consciousness. Patient reports that over the last 3 days she has been experiencing some upper respiratory symptoms including postnasal drip, nausea without emesis, sore throat not relieved with Claritin, sneezing, sinus pressure kind, nonproductive cough, sinus headache, ear fullness without dizziness, chills. Last night, while feeling chilled, warm, clammy she attempted to relieve her nausea by walking to the bathroom and retching over the toilet. This action caused her to pass out momentarily, hitting the right forehead and right upper orbit on impact. She denies any other injuries, amnesia, loss of bowels, and states that she was only unconscious for a moment. She denies any fevers, shortness of breath, chest pain or palpitations, abdominal pain, diarrhea, loss of appetite. She notes that she has felt as though she was going to pass out once before, when she was preparing for a colonoscopy. Triage notes and Nursing notes were reviewed by myself. Any discrepancies are addressed above. PAST MEDICAL HISTORY     Past Medical History:   Diagnosis Date    Acquired autoimmune hypothyroidism     Breast cancer ([facility] [address] 08/2020    Diverticulitis     Dr. Jeevan Alicia    History of kidney stones     History of therapeutic radiation     oct.  2020    Intestinal metaplasia of gastric mucosa         Osteopenia 2006       SURGICAL HISTORY       Past Surgical History:   Procedure Laterality Date    BREAST LUMPECTOMY Right 8/18/2020    RIGHT BREAST LUMPECTOMY AND SENTINEL LYMPH NODE BIOPSY performed by Jeimy Hatch MD at [address] LUMPECTOMY Right 8/27/2020    RE-EXCISION ANTERIOR MARGIN RIGHT BREAST performed by Jeimy Hatch MD at [address]  [id number]    x2    COLONOSCOPY  08/2018    Dr. Miles Moyer Left 06/02/2017    With left ureteroscopy, basket retrieval of stone fragments, left ureterenoscopy, basket retrieval of renal stones, and left ureter stent placement--Dr Vinnie Aldana    LAPAROSCOPY  [id number]    pelvic    [address] RIGHT  8/4/2020    [address] 8/4/2020 [address] BIOPSY OF BREAST, NEEDLE CORE Right 08/04/2020    idc    UPPER GASTROINTESTINAL ENDOSCOPY  2010    US GUIDED NEEDLE LOC OF RIGHT BREAST  8/18/2020    US GUIDED NEEDLE LOC OF RIGHT BREAST 8/18/2020 [address]       Discharge Medication List as of 8/10/2022  3:06 PM        CONTINUE these medications which have NOT CHANGED    Details   anastrozole (ARIMIDEX) 1 MG tablet Take 1 tablet by mouth in the morning., Disp-30 tablet, R-11Normal      Cholecalciferol 400 UNIT TABS tablet Take 2,000 Units by mouth in the morning. Historical Med      thyroid (ARMOUR) 15 MG tablet Take 15 mg by mouth daily Alternate every other day with 30 mgHistorical Med             ALLERGIES       Allergies   Allergen Reactions    Acetaminophen      Gi upset    Aspartame And Phenylalanine     Bextra [Valdecoxib]      urinary retention    Doxycycline      GI    Magnesium Citrate      Internal  Pain, nausea, vomiting, diarrhea    Ciprofloxacin Palpitations    Flagyl [Metronidazole] Palpitations    Neomycin Rash       FAMILY HISTORY       Family History   Problem Relation Age of Onset    Other Mother         fuches corneal dystrophy, scleroderma, osteoporosis    High Blood Pressure Mother         pulmonary htn    High Blood Pressure Father     Cancer Father         lung    Diabetes Maternal Grandmother     Cancer Maternal Grandmother         pancreatic    Other Maternal Grandfather         fuches corneal dystrophy    Other Sister         fuches corneal dystrophy    Other Sister         fuches corneal dystrophy    Heart Disease Sister         pacemaker    Other Sister         fuches corneal dystrophy, hypothyroidism    Other Sister         hypothyroidism, reynauds        SOCIAL HISTORY       Social History     Socioeconomic History    Marital status:      Spouse name: Saint Davies    Number of children: 2    Years of education: None    Highest education level: None   Tobacco Use    Smoking status: Former     Packs/day: 1.00     Years: 20.00     Pack years: 20.00     Types: Cigarettes     Quit date: 1997     Years since quittin.6    Smokeless tobacco: Never   Vaping Use    Vaping Use: Never used   Substance and Sexual Activity    Alcohol use: No    Drug use: No       REVIEW OF SYSTEMS     Review of Systems   Constitutional:  Positive for chills and diaphoresis. Negative for appetite change, fatigue, fever and unexpected weight change. HENT:  Positive for congestion, postnasal drip, sinus pressure, sneezing and sore throat. Negative for ear discharge, ear pain, hearing loss, rhinorrhea, sinus pain, trouble swallowing and voice change. Eyes:  Negative for pain and visual disturbance. Respiratory:  Positive for cough. Negative for chest tightness, shortness of breath and wheezing. Cardiovascular:  Negative for chest pain, palpitations and leg swelling. Gastrointestinal:  Negative for abdominal pain, blood in stool, constipation, diarrhea, nausea and vomiting. Genitourinary:  Negative for dysuria, frequency and hematuria. Musculoskeletal:  Negative for arthralgias, back pain, joint swelling, myalgias and neck stiffness. Skin:  Positive for wound. Negative for rash. Neurological:  Positive for syncope and headaches. Negative for dizziness, speech difficulty, weakness, light-headedness and numbness. range of motion. Cervical back: Normal range of motion and neck supple. No rigidity or tenderness. Skin:     General: Skin is warm and dry. Findings: Bruising (overlying right forehead and upper lateral orbit) present. No abrasion, laceration or lesion. Neurological:      General: No focal deficit present. Mental Status: She is alert and oriented to person, place, and time. Mental status is at baseline. Cranial Nerves: No cranial nerve deficit. Sensory: No sensory deficit. Motor: No weakness. Coordination: Coordination normal.   Psychiatric:         Mood and Affect: Mood normal.         Behavior: Behavior normal.         Thought Content: Thought content normal.         Judgment: Judgment normal.         DIAGNOSTIC RESULTS     EKG:(none if blank)  All EKGs are interpreted by the Emergency Department Physician who either signs or Co-signs this chart in the absence of a cardiologist.        RADIOLOGY: (none if blank)   I directly visualized the following images and reviewed the radiologist interpretations. Interpretation per the Radiologist below, if available at the time of this note:  CT MAXILLOFACIAL WO CONTRAST   Final Result   1. No acute fracture of the facial bones. **This report has been created using voice recognition software. It may contain minor errors which are inherent in voice recognition technology. **      Final report electronically signed by Dr Yves Gill on 8/10/2022 2:53 PM      CT HEAD WO CONTRAST   Final Result   1. No acute intracranial abnormality. **This report has been created using voice recognition software. It may contain minor errors which are inherent in voice recognition technology. **      Final report electronically signed by Dr Yves Gill on 8/10/2022 2:21 PM      XR CHEST PORTABLE   Final Result   1.  Hyperinflation of the lungs with flattening of the hemidiaphragms that can relate to chronic lung disease or COPD            **This report has been created using voice recognition software. It may contain minor errors which are inherent in voice recognition technology. **      Final report electronically signed by Dr Hugo Nunn on 8/10/2022 11:47 AM          LABS:  Labs Reviewed   COVID-19, RAPID - Abnormal; Notable for the following components:       Result Value    SARS-CoV-2, NAAT DETECTED (*)     All other components within normal limits   CBC WITH AUTO DIFFERENTIAL - Abnormal; Notable for the following components:    MPV 9.1 (*)     Lymphocytes Absolute 0.3 (*)     Monocytes Absolute 0.3 (*)     All other components within normal limits   BASIC METABOLIC PANEL W/ REFLEX TO MG FOR LOW K - Abnormal; Notable for the following components:    Glucose 120 (*)     All other components within normal limits   RAPID INFLUENZA A/B ANTIGENS   HEPATIC FUNCTION PANEL   TROPONIN   LIPASE   BRAIN NATRIURETIC PEPTIDE   ANION GAP   GLOMERULAR FILTRATION RATE, ESTIMATED   OSMOLALITY   D-DIMER, QUANTITATIVE       All other labs were within normal range or not returned as of this dictation. Please note, any cultures that may have been sent were not resulted at the time of this patient visit. EMERGENCY DEPARTMENT COURSE and Medical Decision Making:     Vitals:    Vitals:    08/10/22 1121 08/10/22 1310 08/10/22 1432   BP: 116/69 113/63 111/69   Pulse: 98 99 95   Resp: 16 15 16   Temp: 98.3 °F (36.8 °C)     TempSrc: Oral     SpO2: 97% 98% 98%       PROCEDURES: (None if blank)  EKG 12 Lead    Date/Time: 8/10/2022 9:12 PM  Performed by: WILIAN Mccray CNP  Authorized by: WILIAN Mccray CNP       ED Course as of 08/10/22 2129   Wed Aug 10, 2022   1502 Reviewed case with Dr Bárbara Cope. He recommends CTA of the chest or at minimum DDimer to r/o PE.  [NW]   1523 Reviewed results with patient and  at the bedside.  Patient does not want a CTA of the chest and states she does not have any shortness of breath, chest pain or other signs of PE. Discussed with her that PE could not be ruled out without CTA. Patient states she does not want CTA and does not feel she needs further blood work. Instructed to return to ER immediately if she has chest pain, shortness of breath or pain in 1 leg accompanied by swelling. Patient is discharged with a pulse ox check and is to return with a pulse ox less than 92%. Patient is agreeable with this plan. [NW]      ED Course User Index  [NW] Morgan Delong, APRN - CNP     MDM  Number of Diagnoses or Management Options  COVID-19: new, needed workup  Syncope and collapse: new, needed workup     Amount and/or Complexity of Data Reviewed  Clinical lab tests: ordered and reviewed  Tests in the radiology section of CPT®: ordered and reviewed  Tests in the medicine section of CPT®: ordered and reviewed  Review and summarize past medical records: yes  Discuss the patient with other providers: yes  Independent visualization of images, tracings, or specimens: yes    Risk of Complications, Morbidity, and/or Mortality  Presenting problems: low  Diagnostic procedures: moderate  Management options: low      DDX: vasovagal syncope vs Covid vs hypoglycemia vs arrhythmia vs NSTEMI vs pneumonia vs influenza vs electrolyte abnormality vs PE. Patient refused CTA Head or CTA chest. Labs are reassuring. CT of face, head are reassuring. CXR reassuring. Patient does test positive for COVID-19. Patient will be discharged home with symptomatic treatment. Patient follow-up with primary care provider. Patient instructed to return to ER for worsening symptoms, chest pain, inability to keep liquids down, inability to urinate for greater than 8 hours or difficulty breathing. Follow-up with your primary care provider. Check pulse ox 4 times a day and return to ER for oxygen saturation less than 92%. May take Tylenol or ibuprofen as needed for pain or fever.       ED Medications administered this visit:    Medications ondansetron (ZOFRAN-ODT) disintegrating tablet 4 mg (4 mg Oral Given 8/10/22 1530)         FINAL IMPRESSION      1. Syncope and collapse    2.  COVID-19          DISPOSITION/PLAN   DISPOSITION Decision To Discharge 08/10/2022 03:44:42 PM      PATIENT REFERRED TO:  DO Queenie Alexandre [facility] [address]  [place] [address]          DISCHARGE MEDICATIONS:  Discharge Medication List as of 8/10/2022  3:06 PM        START taking these medications    Details   benzonatate (TESSALON PERLES) 100 MG capsule Take 1 capsule by mouth 3 times daily as needed for Cough, Disp-30 capsule, R-0Normal      ondansetron (ZOFRAN ODT) 4 MG disintegrating tablet Place 1 tablet under the tongue every 8 hours as needed for Nausea, Disp-20 tablet, R-0Normal                    WILIAN Muse CNP (electronically signed)          WILIAN Muse CNP  08/10/22 3207

## 2022-08-11 ENCOUNTER — CARE COORDINATION (OUTPATIENT)
Dept: CARE COORDINATION | Age: 62
End: 2022-08-11

## 2022-08-11 LAB
EKG ATRIAL RATE: 104 BPM
EKG P AXIS: 82 DEGREES
EKG P-R INTERVAL: 176 MS
EKG Q-T INTERVAL: 334 MS
EKG QRS DURATION: 88 MS
EKG QTC CALCULATION (BAZETT): 439 MS
EKG R AXIS: 80 DEGREES
EKG T AXIS: 68 DEGREES
EKG VENTRICULAR RATE: 104 BPM

## 2022-08-11 PROCEDURE — 93010 ELECTROCARDIOGRAM REPORT: CPT | Performed by: INTERNAL MEDICINE

## 2022-08-11 NOTE — CARE COORDINATION
Patient contacted regarding COVID-19 diagnosis and pulse oximeter ordered at discharge. Discussed COVID-19 related testing which was available at this time. Test results were positive. Patient informed of results, if available? Yes. Ambulatory Care Manager contacted the patient by telephone to perform post discharge assessment. Call within 2 business days of discharge: Yes. Verified name and  with patient as identifiers. Provided introduction to self, and explanation of the CTN/ACM role, and reason for call due to risk factors for infection and/or exposure to COVID-19. Symptoms reviewed with patient who verbalized the following symptoms: fatigue  cough  no new symptoms  no worsening symptoms. Due to no new or worsening symptoms encounter was not routed to provider for escalation. Discussed follow-up appointments. If no appointment was previously scheduled, appointment scheduling offered: No.  [facility] follow up appointment(s):   Future Appointments   Date Time Provider Andres Holland   2022  8:00 AM [address]   10/25/2022  9:00 AM Baron Leong MD  Oncology P - [facility]   2022 11:30 AM Popeye Willard MD hali [address]     Non-[facility] follow up appointment(s): PCP office aware of diagnosis and patient had video visit before ED visit    Non-face-to-face services provided:  Obtained and reviewed discharge summary and/or continuity of care documents     Advance Care Planning:   Does patient have an Advance Directive:  decision maker updated. Educated patient about risk for severe COVID-19 due to risk factors according to CDC guidelines. ACM reviewed discharge instructions, medical action plan and red flag symptoms with the patient who verbalized understanding. Discussed COVID vaccination status: Yes. Education provided on COVID-19 vaccination as appropriate. Discussed exposure protocols and quarantine with CDC Guidelines.  Patient was given an opportunity to verbalize any questions and concerns and agrees to contact ACM or health care provider for questions related to their healthcare. Reviewed and educated patient on any new and changed medications related to discharge diagnosis     Was patient discharged with a pulse oximeter? yes, discussed and confirmed pulse oximeter discharge instructions and when to notify provider or seek emergency care. ACM provided contact information. Plan for follow-up call in 5-7 days based on severity of symptoms and risk factors.     States is feeling better  C/O slight cough and slight sore throat  Pulse ox 95-97%

## 2022-08-17 ENCOUNTER — TELEPHONE (OUTPATIENT)
Dept: CASE MANAGEMENT | Age: 62
End: 2022-08-17

## 2022-08-17 ENCOUNTER — CARE COORDINATION (OUTPATIENT)
Dept: CARE COORDINATION | Age: 62
End: 2022-08-17

## 2022-08-17 NOTE — TELEPHONE ENCOUNTER
Name: Pamela Carmona  : 1960  MRN: [id number]    Oncology Navigation Follow-Up Note    Contact Type:  Telephone    Notes: Called patient about genetic evaluation and testing at Dr. Karina Head request. Patient wants to review Anaid Jang information first and will call back if wants to do pre-counseling. MGM had pancreatic cancer in her [de-identified] but needs to clarify if maternal uncle had pancreatic cancer for sure. Only cancer on paternal side-father has lung cancer. Mailed CHEYENNE Bar, Kari, questionnaire, and instructions just in case does decide to move forward with the Zoom appointment. Will await her decision.     Electronically signed by Saqib Jesus RN on 2022 at 10:23 AM

## 2022-08-17 NOTE — CARE COORDINATION
You Patient resolved from the Care Transitions episode on 8/17/22  Discussed COVID-19 related testing which was available at this time. Test results were positive. Patient informed of results, if available? Yes    Patient/family has been provided the following resources and education related to COVID-19:                         Signs, symptoms and red flags related to COVID-19            CDC exposure and quarantine guidelines            [employer] exposure contact - [phone]            Contact for their local Department of Health                 Patient currently reports that the following symptoms have improved:  fatigue and no new/worsening symptoms     No further outreach scheduled with this CTN/ACM. Episode of Care resolved. Patient has this CTN/ACM contact information if future needs arise.      Pulse ox = 96-99% on room air  Feeling better, head cold and fatigue

## 2022-09-13 ENCOUNTER — HOSPITAL ENCOUNTER (OUTPATIENT)
Dept: PHYSICAL THERAPY | Age: 62
Setting detail: THERAPIES SERIES
Discharge: HOME | End: 2022-09-13
Payer: COMMERCIAL

## 2022-09-13 PROCEDURE — 97530 THERAPEUTIC ACTIVITIES: CPT

## 2022-09-13 PROCEDURE — 97140 MANUAL THERAPY 1/> REGIONS: CPT

## 2022-09-13 NOTE — PROGRESS NOTES
[address] [facility]  ONCOLOGY REHABILITATION  PHYSICAL THERAPY  [] DAILY NOTE [] PROGRESS NOTE [x] DISCHARGE NOTE    [x] [facility]     Date: 2022  Patient Name:  Jeremiah Mix  : 1960  MRN: [id number]     Referring Practitioner Rohit Kohler CNP/Dr. Sridhar Hutchins MD   Diagnosis Malignant neoplasm of upper-outer quadrant of right female breast [C50.411]    Treatment Diagnosis R breast lymphedema   Date of Evaluation 21    Additional Pertinent History thyroid disease, osteoporosis       Functional Outcome Measure Used O: QuickDASH   Functional Outcome Score 2.7% impaired (22-DISCHARGE)        Insurance: Primary: Payor: Verneta House /  /  / ,   Secondary:    Authorization Information: Aquatics and modalities covered   Visit # 18, 9/10 (DISCHARGE NOTE)   Visits Allowed: Unlimited   Recertification Date:  (8 weeks)   Physician Follow-Up: 22     Physician Orders: Breast lymphedema   History of Present Illness: 20 R IDC ER/AK+, HER2-, 20 R Lumpectomy with SLNB with 0/1 node +, 20 re-excision, completed radiation on 2020, on Arimidex, completed care for R breast lymphedema from 21 - 21      SUBJECTIVE:   2022 The patient is a very pleasant and motivated 57year old female who was initially evaluated on 21 with the tightness/pain in the right upper extremity and truncal region and increased Fibrotic tissue/scar tissue in right breast tissue/chest well anteriorly. The patient presented on this date and reported feeling better/less fatigued since having COVID. She also reported that she feels as if the swelling has decreased. \"My arm and upper body feels much better\" per patient. OBJECTIVE  Lymphedema Assessment of Right Lower Extremity/Extremities  Hyperkeratosis - Abnormal thickening of the outer layer of the skin. None   Hyperplasia - Presence of too many lymphatic vessels that do not function properly.  None Hyperpigmentation - Darkening of skin Mild   Papillomas - Outward growth on skin None   Lymphorrhea (Weeping) - Lymph leakage through the skin, also known as Weeping Lymphedema. None    Pitting Edema None   Skin Condition Normal   Open Wound(s) No Open Wounds   Tissue Temperature Normal   Skin Folds None   Fibrotic Tissue Mild (Scar/Fibrotic tissue located primarily at healed incision areas of right breast/axillary region). Orange Peel Texture Mild - Location: Right Breast tissue. Nailbed Appearance/Condition [facility]     Circimferential Measurements  Circumferential Measurements for 9/14/2022 are as follows: In comparison to baseline. Right (Baseline)  (12/09/21) Right   (09/13/22) Changes in comparison to 12/09/21   Metacarpal Heads 17.6 cm 17.2  (-) 0.4 cm   Ulnar Styloid Process 13.7 cm 14.4 (+) 0.7 cm   10 cm Distal to Lateral  Epicondyle 21.6 cm 23.0 (+) 1.4 cm   Elbow 22.0 cm 22.7 (+) 0.7 cm   10 cm Proximal to Lateral  Epicondyle 28.9 cm 28.3 (-) 0.6 cm   Axillary Crease 30.2 cm 27.5 (-) 2.7 cm   TOTAL 134.0 cm 133.1 cm (-) 0.9 cm     TREATMENT   Precautions: -RIGHT UPPER EXTREMITY Lymphedema Risk  -NO BLOOD PRESSURE/DRAW IN RIGHT UE.  -Skin Care precautions (Decrease risk of infection). Pain: -Patient denied pain pre/during/post treatment today.     X in shaded column indicates activity completed today   Modalities Parameters/  Location  Notes   -      Manual Therapy Time/Technique  Notes   Manual Lymph Drainage (MLD) Right Upper Extremity: Trunk: Effleurage, Profundus, Terminus, Shoulder Collectors, Axillary Lnn, Inguinal Lnn, and (AI) Anterior Axillary to Inguinal  , Upper Extremity: Upper Arm (Anterior, Posterior, Lateral, Medial to Lateral), Antecubital Fossa (Anterior, Posterior, Lateral, Medial), Forearm (Anterior, Posterior), Wrist/Hand (Anterior, Posterior), and Fingers, Rework: Inguinal Lnn, Axillary Lnn, Shoulder Collectors, Terminus, Profundus, and Effleurage  -POSITION: Supine and head of bed slightly elevated and bilateral knees supported on bolster. -PATIENT TOLERANCE: Patient reported moderate tenderness and soreness with light Fibrotic Technique/MLD on the  the right lateral breast and axillary region. Fibrotic Tissue Technique -LOCATION: Right upper quadrant (posteriorly/anteriorly). -AMOUNT OF PRESSURE: Light moderate. -TOLERANCE: Fair (Reports by patient of tenderness). -Goal: Fibrotic Tissue Breakdown/Softening. ASSESSMENT  The patient is a pleasant, cooperative, and motivated 57year old female who has made good progress with the Complete Decongestive Therapy/Manual Lymph Drainage (CDT/MLD) treatments as noted by the followin) The patient has demonstrated a decrease in total circumferential measurements in comparison to previous measurements by 0.9 cm total circumference from right upper extremity. Jeanette Flores 2) The patient has met 4/4 long term goals. (No short term goals)   3) The patient has demonstrated a decrease in firm/fibrotic tissue from moderate to minimal tissue firmness/fibrotic tissue as noted by the increase in skin stretch during MLD and by softened tissue texture. 4) The patient has demonstrated decreased dry skin. GOALS:  Patient Goal: Get rid of lymphedema -      Short Term Goals to be met in 12 weeks:  See LTGs     Long Term Goals to be met in 12 weeks:   Pt to improve QuickDASH score from 4.5% impaired to 0% impaired for improved shoulder function for recreational activities. (GOAL MET)    Pt to demo R shoulder PROM flexion improved from 171 deg to 175 deg for ease with reaching tasks. (GOAL MET:  175 deg)    Pt to demo fullness in R upper back reduced for improved comfort with wearing bra and sleeping. (GOAL MET: Patient reported she does not feel the fullness and has been trying difference bras). Pt to be independent with HEP for lymphedema management to control her symptoms and reduce the risk of cellulitis.    (GOAL MET)    Patient Education: 1) Lymphedema Awareness continued with types of Lymphedema, risk and signs of infection, Lymphedema program progression. 2) Reviewed goals and plan of care. Education Outcome: Verbalized understanding  Education Barriers: None    PLAN: The patient is being discharged from the Lymphedema/Oncology Rehabilitation program at this time due to meeting all goals and progressing well with the treatments. She is independent with her home program but would benefit from sequential compression pump for home use. Time In 0803   Time Out 0900   Timed Code Minutes: 57 min   Total Treatment Time: 57 min     THANK YOU FOR ALLOWING US TO ASSIST IN THE CARE AND TREATMENT OF THIS PATIENT!      Electronically Signed by: Olman Gonzalez PT, JUAN-CRISTOPHER, KEENA    9/13/2022

## 2022-09-15 ENCOUNTER — OFFICE VISIT (OUTPATIENT)
Dept: FAMILY MEDICINE CLINIC | Age: 62
End: 2022-09-15
Payer: COMMERCIAL

## 2022-09-15 VITALS
DIASTOLIC BLOOD PRESSURE: 68 MMHG | OXYGEN SATURATION: 97 % | BODY MASS INDEX: 23.98 KG/M2 | HEART RATE: 66 BPM | WEIGHT: 127 LBS | TEMPERATURE: 97.9 F | SYSTOLIC BLOOD PRESSURE: 112 MMHG | RESPIRATION RATE: 15 BRPM | HEIGHT: 61 IN

## 2022-09-15 DIAGNOSIS — M75.101 NONTRAUMATIC TEAR OF SUPRASPINATUS TENDON, RIGHT: ICD-10-CM

## 2022-09-15 DIAGNOSIS — E03.4 HYPOTHYROIDISM DUE TO ACQUIRED ATROPHY OF THYROID: ICD-10-CM

## 2022-09-15 DIAGNOSIS — C50.411 CARCINOMA OF UPPER-OUTER QUADRANT OF RIGHT BREAST IN FEMALE, ESTROGEN RECEPTOR NEGATIVE (HCC): ICD-10-CM

## 2022-09-15 DIAGNOSIS — Z17.1 CARCINOMA OF UPPER-OUTER QUADRANT OF RIGHT BREAST IN FEMALE, ESTROGEN RECEPTOR NEGATIVE (HCC): ICD-10-CM

## 2022-09-15 DIAGNOSIS — L98.9 SKIN LESION: ICD-10-CM

## 2022-09-15 DIAGNOSIS — Z00.00 ENCOUNTER FOR MEDICAL EXAMINATION TO ESTABLISH CARE: Primary | ICD-10-CM

## 2022-09-15 DIAGNOSIS — H00.14 CHALAZION OF LEFT UPPER EYELID: ICD-10-CM

## 2022-09-15 PROCEDURE — 99204 OFFICE O/P NEW MOD 45 MIN: CPT | Performed by: NURSE PRACTITIONER

## 2022-09-15 SDOH — ECONOMIC STABILITY: FOOD INSECURITY: WITHIN THE PAST 12 MONTHS, YOU WORRIED THAT YOUR FOOD WOULD RUN OUT BEFORE YOU GOT MONEY TO BUY MORE.: NEVER TRUE

## 2022-09-15 SDOH — ECONOMIC STABILITY: FOOD INSECURITY: WITHIN THE PAST 12 MONTHS, THE FOOD YOU BOUGHT JUST DIDN'T LAST AND YOU DIDN'T HAVE MONEY TO GET MORE.: NEVER TRUE

## 2022-09-15 ASSESSMENT — ENCOUNTER SYMPTOMS
RESPIRATORY NEGATIVE: 1
PHOTOPHOBIA: 0
GASTROINTESTINAL NEGATIVE: 1
COLOR CHANGE: 1

## 2022-09-15 ASSESSMENT — SOCIAL DETERMINANTS OF HEALTH (SDOH): HOW HARD IS IT FOR YOU TO PAY FOR THE VERY BASICS LIKE FOOD, HOUSING, MEDICAL CARE, AND HEATING?: NOT HARD AT ALL

## 2022-09-15 ASSESSMENT — PATIENT HEALTH QUESTIONNAIRE - PHQ9
SUM OF ALL RESPONSES TO PHQ QUESTIONS 1-9: 0
2. FEELING DOWN, DEPRESSED OR HOPELESS: 0
1. LITTLE INTEREST OR PLEASURE IN DOING THINGS: 0
SUM OF ALL RESPONSES TO PHQ QUESTIONS 1-9: 0
SUM OF ALL RESPONSES TO PHQ QUESTIONS 1-9: 0
SUM OF ALL RESPONSES TO PHQ9 QUESTIONS 1 & 2: 0
SUM OF ALL RESPONSES TO PHQ QUESTIONS 1-9: 0

## 2022-09-15 NOTE — PROGRESS NOTES
[address]  [address] [place] [address]  Dept: [phone]  Dept Fax: [phone]  Loc: [phone]    Rabia Alejo is a 57 y.o. femalewho presents today for her medical conditions/complaints as noted below. Katie Harrington c/o of New Patient (New patient to get established. Discuss PT/OT. Spot on left shoulder. No recent pap)      HPI:      Pt here to get established. Pt with a history of right breast cancer being managed by Oncology locally. Pt is on arimadex. HISTORY OF PRESENT ILLNESS     7/31/2020. Annual screening mammogram showed 0.9 cm mass in right breast.    8/4/2020. Core biopsy showed low-grade invasive ductal carcinoma the right breast.  Tumor cells were estrogen receptor positive, progesterone receptor positive and HER2 was 2+ by IHC but FISH was negative for amplification. 8/18/2020. Lumpectomy and sentinel lymph node biopsy by Dr. Zahira Gonzalez revealed pathology with invasive ductal adenocarcinoma, grade 1. The anterior margin which was skin was focally positive for invasive carcinoma. DCIS was 1 mm from the skin margin. Staging showed pT1b, pN0 (SN). [place] lymph node biopsy was negative for malignancy. 8/27/2020. Reexcision showed no evidence of residual malignancy. Family history is positive for multiple family members with pancreatic cancer. Previously she declined genetic counseling but said that she would think about it and discuss it with her family members before making a final decision. 8/4/2020 through 11/6/2020. Radiation therapy to the breast receiving 5256 cGy in 20 fractions. November, 2020. Started adjuvant hormonal therapy with aromatase inhibitor. 1/25/2022. Patient is tolerating her hormonal treatment well with no significant arthralgia and minimal hot flashes. July 2021. Annual screening mammogram showed benign findings.   Current mammogram is being SIGNAL/MARROW EDEMA/FRACTURE: No acute fracture. .  Yellow marrow signal is noted. ROTATOR CUFF TENDONS: Low-grade bursal surface partial-thickness tear of the supraspinatus tendon at the footplate is seen. . The infraspinatus, teres minor, subscapularis tendons are intact. LONG HEAD BICEPS TENDON: Intact     GLENOID LABRUM: Grossly intact. ACROMIOCLAVICULAR ARTICULATION: No degeneration     GLENOHUMERAL ARTICULATION: No degeneration     ACROMION: No os acromiale. Inferolateral downsloping acromion     GLENOID: Hyaline cartilage is intact. HUMERAL HEAD: No Hill-Sachs deformity. Traction edema is seen in the greater tuberosity of the humerus     ACROMIOHUMERAL INTERVAL: Moderate narrowing     CORACOHUMERAL INTERVAL: Unremarkable. MUSCULATURE: No atrophy     CORACOCLAVICULAR/CORACOACROMIAL/CORACOHUMERAL LIGAMENTS: Intact and unremarkable. GLENOHUMERAL LIGAMENTS: Intact and unremarkable. JOINT FLUID: No effusion     SUBACROMIAL-SUBDELTOID BURSA: Mild effusion     SUBCORACOID BURSA: No effusion     SUPRASCAPULAR/SPINOGLENOID NOTCH: No cyst     SOFT TISSUE MASS/PATHOLOGICALLY ENLARGED LYMPHADENOPATHY: None. OTHER: A 4 mm T1 hypointense and T2 hyperintense focus is seen in the proximal humeral diaphysis. Impression  1. Low-grade bursal surface partial-thickness tear of the supraspinatus tendon at the footplate. 2. Indeterminate 4 mm T1 hypointense and T2 hyperintense focus is seen in the proximal humeral diaphysis. Given the history of a known malignancy, consider further evaluation with bone scintigraphy. Pt has a skin lesion of her left upper shoulder, anterior region. Does not know how long she has had it, does not think it is getting bigger. It does not flake or itch, no history of skin cancer. She has a lesion of her left upper eyelid, it has had some thick white material that has been expressed from it, it does not hurt or turn red.      States she had her labs in April and they are normal, states cholesterol level has been normal, not on any cholesterol medication. Lab Results       Component                Value               Date/Time                  NA                       136                 08/10/2022 12:04 PM        K                        4.4                 08/10/2022 12:04 PM        CL                       101                 08/10/2022 12:04 PM        CO2                      24                  08/10/2022 12:04 PM        BUN                      18                  08/10/2022 12:04 PM        CREATININE               0.5                 08/10/2022 12:04 PM        GLUCOSE                  120                 08/10/2022 12:04 PM        CALCIUM                  9.9                 08/10/2022 12:04 PM       Hypothyroidism    HPI:  Currently treated for Hypothyroidism? Yes  Fatigue? No  Recent change in weight? No  Cold/Heat intolerance? No  Diarrhea/Constipation? No  Diaphoresis? No  Anxiety? No  Palpitations? No   Hair Loss? No              Current Outpatient Medications   Medication Sig Dispense Refill    anastrozole (ARIMIDEX) 1 MG tablet Take 1 tablet by mouth in the morning. 30 tablet 11    Cholecalciferol 400 UNIT TABS tablet Take 2,000 Units by mouth in the morning. thyroid (ARMOUR) 15 MG tablet Take 15 mg by mouth daily Alternate every other day with 30 mg      ondansetron (ZOFRAN ODT) 4 MG disintegrating tablet Place 1 tablet under the tongue every 8 hours as needed for Nausea (Patient not taking: Reported on 9/15/2022) 20 tablet 0     No current facility-administered medications for this visit. Past Medical History:   Diagnosis Date    Acquired autoimmune hypothyroidism     [facility] 08/2020    Diverticulitis     Dr. Amelia Alexandra    History of kidney stones     History of therapeutic radiation     oct.  2020    Intestinal metaplasia of gastric mucosa     Dr. Amelia Alexandra    Invasive ductal carcinoma of breast, right ([facility] Substance Use Topics    Alcohol use: No        Allergies   Allergen Reactions    Acetaminophen      Gi upset    Aspartame And Phenylalanine     Bextra [Valdecoxib]      urinary retention    Doxycycline      GI    Magnesium Citrate      Internal  Pain, nausea, vomiting, diarrhea    Ciprofloxacin Palpitations    Flagyl [Metronidazole] Palpitations    Neomycin Rash       Health Maintenance   Topic Date Due    Pneumococcal 0-64 years Vaccine (1 - PCV) Never done    HIV screen  Never done    Hepatitis C screen  Never done    Shingles vaccine (1 of 2) Never done    Cervical cancer screen  Never done    Lipids  Never done    Colorectal Cancer Screen  Never done    COVID-19 Vaccine (4 - Booster for Pfizer series) 02/02/2022    Flu vaccine (1) Never done    Breast cancer screen  08/03/2023    Depression Screen  09/15/2023    DTaP/Tdap/Td vaccine (2 - Td or Tdap) 07/12/2027    Hepatitis A vaccine  Aged Out    Hepatitis B vaccine  Aged Out    Hib vaccine  Aged Out    Meningococcal (ACWY) vaccine  Aged Out       Subjective:      Review of Systems   Constitutional:  Negative for chills, fatigue and fever. HENT: Negative. Eyes:  Negative for photophobia and visual disturbance. Respiratory: Negative. Cardiovascular: Negative. Gastrointestinal: Negative. Endocrine: Negative for polydipsia, polyphagia and polyuria. Genitourinary:  Negative for difficulty urinating and dysuria. Musculoskeletal:  Positive for arthralgias and myalgias. Negative for joint swelling, neck pain and neck stiffness. Skin:  Positive for color change. Allergic/Immunologic: Negative for environmental allergies. Neurological:  Negative for dizziness, facial asymmetry, weakness, light-headedness and headaches. Psychiatric/Behavioral:  Negative for self-injury, sleep disturbance and suicidal ideas.       Objective:      /68   Pulse 66   Temp 97.9 °F (36.6 °C)   Resp 15   Ht 5' 1\" (1.549 m)   Wt 127 lb (57.6 kg)   SpO2 97% BMI 24.00 kg/m²      Physical Exam  Vitals and nursing note reviewed. Constitutional:       Appearance: She is not ill-appearing. HENT:      Right Ear: Tympanic membrane, ear canal and external ear normal.      Left Ear: Tympanic membrane, ear canal and external ear normal.      Nose: Nose normal.   Eyes:      Pupils: Pupils are equal, round, and reactive to light. Comments: Left upper eyelid with chalazion or xanthelasma, no erythema, or warmth,  the lesion is soft and palpable. Neck:      Thyroid: No thyroid mass, thyromegaly or thyroid tenderness. Cardiovascular:      Rate and Rhythm: Normal rate and regular rhythm. Pulses: Normal pulses. Heart sounds: Normal heart sounds. No murmur heard. Pulmonary:      Effort: Pulmonary effort is normal. No respiratory distress. Breath sounds: Normal breath sounds. No wheezing. Abdominal:      General: Abdomen is flat. Bowel sounds are normal. There is no distension. Palpations: Abdomen is soft. Tenderness: There is no abdominal tenderness. Musculoskeletal:      Right shoulder: Bony tenderness present. No swelling, deformity, effusion or crepitus. Decreased range of motion. Normal strength. Normal pulse. Comments: Right shoulder exam 5/5 strength in the UEs  ROM is normal bilaterally. Palpitation of the clavicle, AC joint and shoulder joint revealed tenderness, tenderness with palpation of right anterior deltoid,   The patient has a positive standard lift off test.  Neer's test is positive  Empty Can test is positive  Dennis Test is positive    Skin:     General: Skin is warm and dry. Capillary Refill: Capillary refill takes less than 2 seconds. Findings: Erythema present. Comments: Left anterior shoulder lesion red, circular, no flaking noted, 3 mm in size, border regular   Neurological:      General: No focal deficit present. Mental Status: She is alert and oriented to person, place, and time. Psychiatric:         Mood and Affect: Mood normal.         Behavior: Behavior normal.         Thought Content: Thought content normal.         Judgment: Judgment normal.        Assessment/Plan:           1. Encounter for medical examination to establish care      2. Carcinoma of upper-outer quadrant of right breast in female, estrogen receptor negative ([facility]  Continue with oncology     3. Hypothyroidism due to acquired atrophy of thyroid    controlled  4. Nontraumatic tear of supraspinatus tendon, right    - [facility] - Hilda Smith MD, Orthopedic Surgery, [facility]  - Ambulatory referral to Occupational Therapy  - Ambulatory referral to Occupational Therapy    5. Chalazion of left upper eyelid  R/o xanthelasma although she states cholesterol levels have been normal   - [facility] - Naveed Arias MD, Opthalmology, [facility]    6. Skin lesion  R/o melanoma or squamous or basal cell carcinoma   - [facility] - Familia Seo DO, Dermatology, [facility]    Pt in agreement with plan  Colonoscopy is up to date  Needs updated PAP pt to schedule with me. Over 41 minutes spent on visit   Return in about 6 months (around 3/15/2023) for med refill . Reccommended tobaccocessation options including pharmacologic methods, counseled great than 3 minutesduring this visit:  Yes[]  No  []       Patient given educational materials -see patient instructions. Discussed use, benefit, and side effects of prescribedmedications. All patient questions answered. Pt voiced understanding. Reviewedhealth maintenance. Instructed to continue current medications, diet and exercise. Patient agreed with treatment plan. Follow up as directed.        Electronicallysigned by WILIAN Shaffer CNP on 9/15/2022 at 9:42 AM

## 2022-10-11 ENCOUNTER — OFFICE VISIT (OUTPATIENT)
Dept: FAMILY MEDICINE CLINIC | Age: 62
End: 2022-10-11
Payer: COMMERCIAL

## 2022-10-11 VITALS
RESPIRATION RATE: 16 BRPM | HEIGHT: 61 IN | DIASTOLIC BLOOD PRESSURE: 66 MMHG | TEMPERATURE: 98.1 F | SYSTOLIC BLOOD PRESSURE: 114 MMHG | HEART RATE: 77 BPM | WEIGHT: 127.6 LBS | BODY MASS INDEX: 24.09 KG/M2 | OXYGEN SATURATION: 98 %

## 2022-10-11 DIAGNOSIS — E06.3 HASHIMOTO'S DISEASE: ICD-10-CM

## 2022-10-11 DIAGNOSIS — Z01.419 PAP TEST, AS PART OF ROUTINE GYNECOLOGICAL EXAMINATION: Primary | ICD-10-CM

## 2022-10-11 DIAGNOSIS — E04.1 THYROID NODULE: ICD-10-CM

## 2022-10-11 DIAGNOSIS — N95.2 POSTMENOPAUSE ATROPHIC VAGINITIS: ICD-10-CM

## 2022-10-11 DIAGNOSIS — Z12.4 SCREENING FOR CERVICAL CANCER: ICD-10-CM

## 2022-10-11 PROCEDURE — 99396 PREV VISIT EST AGE 40-64: CPT | Performed by: NURSE PRACTITIONER

## 2022-10-11 ASSESSMENT — PATIENT HEALTH QUESTIONNAIRE - PHQ9
DEPRESSION UNABLE TO ASSESS: FUNCTIONAL CAPACITY MOTIVATION LIMITS ACCURACY
2. FEELING DOWN, DEPRESSED OR HOPELESS: 0
SUM OF ALL RESPONSES TO PHQ QUESTIONS 1-9: 0
1. LITTLE INTEREST OR PLEASURE IN DOING THINGS: 0
SUM OF ALL RESPONSES TO PHQ9 QUESTIONS 1 & 2: 0
SUM OF ALL RESPONSES TO PHQ QUESTIONS 1-9: 0

## 2022-10-11 NOTE — PROGRESS NOTES
Abbi Ramsey is a 57 y.o. female for   Chief Complaint   Patient presents with    Follow-up     Here for a pap       HPI:    No LMP recorded. Patient is postmenopausal.  Menses occurs every pt is menopausal since the age of 37, l  Patient Gynecological History No Yes Not Asked comment          History of Abnormal Pap [x]                     []                     []                  Last pap: 6 years ago    Has received HPV vaccine [x]                     []                     []                     Currently sexually active []                     [x]                     []                  post menopausal status   History of Sexually Transmitted Disease [x]            []                     []                     History of Abnormal Mammograms []            [x]                     []                   Last mammo: 2022     Last Dexa Scan: has not had one yet  Last Colonoscopy: normal    Urinary Incontinence? No  Dysuria? No  Vaginal itching or Dryness? No  Vaginal discharge? No    Pt with hashimoto tyroid disease and left lower and upper pole thyroid nodules being monitored yearly with U/S . U/s from 3.2022 reviewed with pt,   Narrative   PROCEDURE: US THYROID       CLINICAL INFORMATION: Thyroid nodule. COMPARISON: Thyroid ultrasound 12/16/2015. TECHNIQUE: Grayscale and color sonographic imaging of the thyroid gland performed in longitudinal and transverse planes. FINDINGS:            The thyroid gland is homogeneous in echotexture. Doppler assessment demonstrates unremarkable vascularity. A hypoechoic left upper pole cystic nodule measures 3 x 3 x 3 mm (previously measured 2 mm). A new left lower pole cystic nodule measures 3 x 1 x 3 mm. The previously seen right-sided thyroid cyst is no longer visualized       No lymphadenopathy is observed. Impression   The previously seen right-sided thyroid cystic nodule is no longer visualized.  Small 3 mm left upper and lower pole cystic nodules are observed. No suspicious thyroid nodule is visualized. A one-year follow-up thyroid ultrasound may be obtained to confirm stability.            Pt states she ha a TSH last March awaiting old records,     Normal colonoscopy 2018 result reviewed, f/u 10 years   Social History     Socioeconomic History    Marital status:      Spouse name: Rose Jimenez    Number of children: 2    Years of education: Not on file    Highest education level: Not on file   Occupational History    Not on file   Tobacco Use    Smoking status: Former     Packs/day: 1.00     Years: 20.00     Pack years: 20.00     Types: Cigarettes     Quit date: 1997     Years since quittin.7    Smokeless tobacco: Never   Vaping Use    Vaping Use: Never used   Substance and Sexual Activity    Alcohol use: No    Drug use: No    Sexual activity: Not on file   Other Topics Concern    Not on file   Social History Narrative    Not on file     Social Determinants of Health     Financial Resource Strain: Low Risk     Difficulty of Paying Living Expenses: Not hard at all   Food Insecurity: No Food Insecurity    Worried About Running Out of Food in the Last Year: Never true    Ran Out of Food in the Last Year: Never true   Transportation Needs: Not on file   Physical Activity: Not on file   Stress: Not on file   Social Connections: Not on file   Intimate Partner Violence: Not on file   Housing Stability: Not on file     Social History     Substance and Sexual Activity   Sexual Activity Not on file       OBJECTIVE:  /66   Pulse 77   Temp 98.1 °F (36.7 °C)   Resp 16   Ht 5' 1\" (1.549 m)   Wt 127 lb 9.6 oz (57.9 kg)   SpO2 98%   BMI 24.11 kg/m²   Physical Examination: General appearance - alert, well appearing, and in no distress  Chest - clear to auscultation, no wheezes, rales or rhonchi, symmetric air entry  Heart - normal rate, regular rhythm, normal S1, S2, no murmurs, rubs, clicks or gallops  Abdomen - soft, nontender, nondistended, no masses or organomegaly  Extremities - peripheral pulses normal, no pedal edema, no clubbing or cyanosis  Psych:  Affect appropriate. Thought process is normal without evidence of depression or psychosis. Good insight and appropriae interaction. Cognition and memory appear to be intact. Breasts - no axillary lymphadenopathy, surgical scars noted pt with history of right breast cancer 2 years ago, lymphedema form pat surgical resection of the tumor, pt with tender breasts , risk and benefit of breast self-exam was discussed, yest  Pelvic - VULVA: normal appearing vulva with no masses, tenderness or lesions, VAGINA: atrophic, PELVIC FLOOR EXAM: no cystocele, rectocele or prolapse noted, CERVIX: friable , erythematous , cervical motion tenderness absent, UTERUS: uterus is normal size, shape, consistency and nontender, ADNEXA: normal adnexa in size, nontender and no masses, RECTAL: normal rectal, no masses, guaiac negative stool obtained, no visible warts, reduced sphincter tone, no rectocele, PAP: Pap smear done today      ASSESSMENT & PLAN  Santos Sierra was seen today for follow-up. Diagnoses and all orders for this visit:    Pap test, as part of routine gynecological examination  -     PAP SMEAR  Continue f/u with Oncology for history of breast cancer. Hashimoto's disease  -     US THYROID; Future  Stable   Thyroid nodule  Will repeat U/S March 2023  Screening for cervical cancer    Postmenopause atrophic vaginitis  Continue with daily MVI and vitamin d      Pt in agreement with plan   Return if symptoms worsen or fail to improve. Follow-up: Will notify patient of pap smear results by phone. Return PRN or 1 year for annual well woman exam.    Counseling was provided today regarding the following topics: Healthy eating habits, Regular exercise, substance abuse and healthy sleep habits.       Breast CA Risk Assessment Calculator - https://www.zita.org/  FRAX Calculator - MapSeats.co.uk  Pap Guidelines - Davy.[email or web address].[email or web address].pdf    Efrem Oneill received counseling on the following healthy behaviors: medication adherence  Reviewed prior labs and health maintenance. Continue current medications, diet and exercise. Discussed use, benefit, and side effects of prescribed medications. Barriers to medication compliance addressed. Patient given educational materials - see patient instructions. All patient questions answered. Patient voiced understanding.

## 2022-10-13 ENCOUNTER — TELEPHONE (OUTPATIENT)
Dept: FAMILY MEDICINE CLINIC | Age: 62
End: 2022-10-13

## 2022-10-13 NOTE — TELEPHONE ENCOUNTER
Spoke to [facility] office  to request patient's records from [facility] request sent 9/15/2022 They state they will send records

## 2022-10-17 LAB — CYTOLOGY THIN PREP PAP: NORMAL

## 2022-10-18 ENCOUNTER — TELEPHONE (OUTPATIENT)
Dept: FAMILY MEDICINE CLINIC | Age: 62
End: 2022-10-18

## 2022-10-18 NOTE — TELEPHONE ENCOUNTER
----- Message from WILIAN Gimenez CNP sent at 10/17/2022  6:06 PM EDT -----  Let pt know her pap test was normal

## 2022-10-25 ENCOUNTER — HOSPITAL ENCOUNTER (OUTPATIENT)
Dept: INFUSION THERAPY | Age: 62
Discharge: HOME | End: 2022-10-25
Payer: COMMERCIAL

## 2022-10-25 ENCOUNTER — OFFICE VISIT (OUTPATIENT)
Dept: ONCOLOGY | Age: 62
End: 2022-10-25
Payer: COMMERCIAL

## 2022-10-25 VITALS
RESPIRATION RATE: 16 BRPM | OXYGEN SATURATION: 98 % | BODY MASS INDEX: 24.43 KG/M2 | TEMPERATURE: 97.8 F | SYSTOLIC BLOOD PRESSURE: 122 MMHG | WEIGHT: 129.4 LBS | DIASTOLIC BLOOD PRESSURE: 54 MMHG | HEART RATE: 76 BPM | HEIGHT: 61 IN

## 2022-10-25 VITALS
HEART RATE: 76 BPM | SYSTOLIC BLOOD PRESSURE: 122 MMHG | OXYGEN SATURATION: 98 % | TEMPERATURE: 97.8 F | RESPIRATION RATE: 16 BRPM | DIASTOLIC BLOOD PRESSURE: 54 MMHG

## 2022-10-25 DIAGNOSIS — C50.411 CARCINOMA OF UPPER-OUTER QUADRANT OF RIGHT BREAST IN FEMALE, ESTROGEN RECEPTOR NEGATIVE (HCC): ICD-10-CM

## 2022-10-25 DIAGNOSIS — M85.88 OSTEOPENIA OF LUMBAR SPINE: Primary | ICD-10-CM

## 2022-10-25 DIAGNOSIS — Z17.1 CARCINOMA OF UPPER-OUTER QUADRANT OF RIGHT BREAST IN FEMALE, ESTROGEN RECEPTOR NEGATIVE (HCC): ICD-10-CM

## 2022-10-25 PROCEDURE — 99211 OFF/OP EST MAY X REQ PHY/QHP: CPT

## 2022-10-25 PROCEDURE — 99214 OFFICE O/P EST MOD 30 MIN: CPT | Performed by: INTERNAL MEDICINE

## 2022-10-25 ASSESSMENT — ENCOUNTER SYMPTOMS
CONSTIPATION: 0
VOMITING: 0
SHORTNESS OF BREATH: 0
TROUBLE SWALLOWING: 0
NAUSEA: 0
RHINORRHEA: 0
COUGH: 0
ANAL BLEEDING: 0
DIARRHEA: 0
ABDOMINAL PAIN: 0
SORE THROAT: 0

## 2022-10-25 NOTE — PROGRESS NOTES
[address] [place] [address]  Dept: [phone]  Loc: [phone]     Sami Bob  1960   No ref. provider found   Karina Hazel, WILIAN - JARRETT       Sami Bob is a 57 y.o.  with breast cancer    CHIEF COMPLAINT  Chief Complaint   Patient presents with    Follow-up     Malignant neoplasm of breast in female, estrogen receptor positive, unspecified laterality, unspecified site of breast          HISTORY OF PRESENT ILLNESS    7/31/2020. Annual screening mammogram showed 0.9 cm mass in right breast.    8/4/2020. Core biopsy showed low-grade invasive ductal carcinoma the right breast.  Tumor cells were estrogen receptor positive, progesterone receptor positive and HER2 was 2+ by IHC but FISH was negative for amplification. 8/18/2020. Lumpectomy and sentinel lymph node biopsy by Dr. Dolores Castro revealed pathology with invasive ductal adenocarcinoma, grade 1. The anterior margin which was skin was focally positive for invasive carcinoma. DCIS was 1 mm from the skin margin. Staging showed pT1b, pN0 (SN). [place] lymph node biopsy was negative for malignancy. 8/27/2020. Reexcision showed no evidence of residual malignancy. Family history is positive for multiple family members with pancreatic cancer. Previously she declined genetic counseling but said that she would think about it and discuss it with her family members before making a final decision. 8/4/2020 through 11/6/2020. Radiation therapy to the breast receiving 5256 cGy in 20 fractions. November, 2020. Started adjuvant hormonal therapy with aromatase inhibitor. 1/25/2022. Patient is tolerating her hormonal treatment well with no significant arthralgia and minimal hot flashes. July 2021. Annual screening mammogram showed benign findings. Current mammogram is being ordered     Mrs. Abhishek Laws was moving some boxes and probably injured her right shoulder. She had MRI of her shoulder on 6/20/2022. This showed low-grade bursal surface partial thickness tear of the supraspinatus tendon. No associated indeterminate 4 mm T1 hypointense and T2 hyperintense focus in the proximal humeral diaphysis. It was said that given the history of a new malignancy, bone scan should be considered. Bone scan was performed on 7/8/2022 and showed no evidence of osseous metastatic disease. There was probable degenerative arthropathic change. The patient is doing well with her hormonal manipulation. She says that she deals with it. She has had hair loss and dry skin which she also attributes to the aromatase inhibitor. She gets weightbearing exercise and takes vitamin D but not calcium when she gets through her diet. She has had no vaginal bleeding and is aware of no lumps bumps or other signs of progressive disease. INTERVAL NOTE    10/25/2022. Mrs. Alina Barbosa returns to the office today in follow-up of her breast cancer. She says that she is doing fairly well. 8/3/2022. Mammogram showed no evidence for malignancy. 8/10/2022. The patient had fever nausea and vomiting syncope and collapse at home in her bathroom at which time she hit her forehead. She was taken to the emergency department. She was found to be COVID-positive. Laboratory data at that time showed that she had a normal CBC. Sugar was 120. BUN and creatinine were normal.  She was ill for about a week. She is taking her medication and not suffering any serious consequences. She has occasional sweats and hot flashes. She has a little bit of arthralgia but not significant to interrupt her medications. Her breast cancer navigator did reach out to her about genetic counseling and testing but the patient decided to defer this and would get back to the navigator but has not done so to the best of my knowledge. She is taking vitamin D.   She feels that she gets enough calcium from her diet. She is doing weightbearing exercise most days of the week. MONITORING PARAMETERS    Mammography, bone scans, MRI. PAST MEDICAL HISTORY  Past Medical History:   Diagnosis Date    Acquired autoimmune hypothyroidism     Breast cancer ([facility] [address] 08/2020    Diverticulitis     Dr. Bertha Kirkpatrick    History of kidney stones     History of therapeutic radiation     oct. 2020    Intestinal metaplasia of gastric mucosa     Dr. Bertha Kirkpatrick    Invasive ductal carcinoma of breast, right ([facility] [address] 08/05/2020    Osteopenia 2006        REVIEW OF SYSTEMS  Review of Systems   Constitutional:  Negative for appetite change, chills and diaphoresis. HENT:  Negative for dental problem, mouth sores, rhinorrhea, sore throat and trouble swallowing. Eyes:  Negative for visual disturbance. Respiratory:  Negative for cough and shortness of breath. Cardiovascular:  Negative for chest pain and leg swelling. Gastrointestinal:  Negative for abdominal pain, anal bleeding, constipation, diarrhea, nausea and vomiting. Genitourinary:  Negative for enuresis, flank pain, hematuria, urgency and vaginal bleeding. Musculoskeletal:  Positive for arthralgias (generalized) and myalgias. Skin:  Positive for pallor. Neurological:  Positive for syncope (had an episode of syncope when she had Covid in August). Negative for seizures, weakness, light-headedness and headaches. Hematological:  Positive for adenopathy (concerned for swelling in thyroid area). Psychiatric/Behavioral:  Negative for dysphoric mood, self-injury, sleep disturbance and suicidal ideas. The patient is not nervous/anxious.        FAMILY HISTORY  Family History   Problem Relation Age of Onset    Other Mother         fuches corneal dystrophy, scleroderma    High Blood Pressure Mother         pulmonary htn    Osteoporosis Mother     Supraventricular tachycardia  Mother     High Blood Pressure Father     Lung Cancer Father     Other Sister         fuches corneal dystrophy    Other Sister         fuches corneal dystrophy    Heart Disease Sister     Pacemaker Sister     Other Sister         fuches corneal dystrophy    Hypothyroidism Sister     Other Sister         hypothyroidism, raynauds    Diabetes Maternal Grandmother     Pancreatic Cancer Maternal Grandmother [de-identified]    Other Maternal Grandfather         fuches corneal dystrophy    Pancreatic Cancer Maternal Uncle         SOCIAL HISTORY  Social History     Socioeconomic History    Marital status:      Spouse name: Aliza Spears    Number of children: 2    Years of education: Not on file    Highest education level: Not on file   Occupational History    Not on file   Tobacco Use    Smoking status: Former     Packs/day: 1.00     Years: 20.00     Pack years: 20.00     Types: Cigarettes     Quit date: 1997     Years since quittin.8    Smokeless tobacco: Never   Vaping Use    Vaping Use: Never used   Substance and Sexual Activity    Alcohol use: No    Drug use: No    Sexual activity: Not on file   Other Topics Concern    Not on file   Social History Narrative    Not on file     Social Determinants of Health     Financial Resource Strain: Low Risk     Difficulty of Paying Living Expenses: Not hard at all   Food Insecurity: No Food Insecurity    Worried About Running Out of Food in the Last Year: Never true    Ran Out of Food in the Last Year: Never true   Transportation Needs: Not on file   Physical Activity: Not on file   Stress: Not on file   Social Connections: Not on file   Intimate Partner Violence: Not on file   Housing Stability: Not on file        CURRENT MEDICATIONS  Current Outpatient Medications   Medication Sig Dispense Refill    anastrozole (ARIMIDEX) 1 MG tablet Take 1 tablet by mouth in the morning. 30 tablet 11    Cholecalciferol 400 UNIT TABS tablet Take 2,000 Units by mouth in the morning.       thyroid (ARMOUR) 15 MG tablet Take 15 mg by mouth daily Alternate every other day with 30 mg       No current facility-administered medications for this visit. OARRS    PDMP Monitoring:    Last PDMP James Teren as Reviewed [facility]):  Review User Review Instant Review Result   Audra Barnes 7/26/2022 12:50 AM Reviewed PDMP [1]       Urine Drug Screenings (1 yr)    No resulted procedures found. Medication Contract and Consent for Opioid Use Documents Filed        No documents found                     PHYSICAL EXAM    Physical Exam  Vitals and nursing note reviewed. Exam conducted with a chaperone present. Constitutional:       General: She is not in acute distress. Appearance: Normal appearance. She is not ill-appearing, toxic-appearing or diaphoretic. Comments: Mrs. Alina Barbosa is a well-developed well-nourished  female who is in no acute distress. She does not appear to be acutely or chronically ill. She is alert, appropriate and interactive. HENT:      Head: Normocephalic and atraumatic. Nose: Nose normal.      Mouth/Throat:      Mouth: Mucous membranes are moist.      Pharynx: Oropharynx is clear. No oropharyngeal exudate or posterior oropharyngeal erythema. Eyes:      General: No scleral icterus. Extraocular Movements: Extraocular movements intact. Conjunctiva/sclera: Conjunctivae normal.      Pupils: Pupils are equal, round, and reactive to light. Cardiovascular:      Rate and Rhythm: Normal rate and regular rhythm. Pulses: Normal pulses. Heart sounds: Normal heart sounds. No murmur heard. No gallop. Pulmonary:      Effort: Pulmonary effort is normal. No respiratory distress. Breath sounds: Normal breath sounds. No stridor. No wheezing, rhonchi or rales. Chest:   Breasts:     Left: No swelling, mass or skin change. Comments: Left breast, well healed scar from mole removal in her youth. Right Breast has 2 well healed scars in the upper outer quadrant. Abdominal:      General: Abdomen is flat. Bowel sounds are normal. There is no distension. Palpations: Abdomen is soft. There is mass. Tenderness: There is no abdominal tenderness. There is no guarding. Hernia: No hernia is present. Musculoskeletal:         General: Normal range of motion. Cervical back: Normal range of motion and neck supple. No rigidity or tenderness. Right lower leg: No edema. Left lower leg: No edema. Lymphadenopathy:      Cervical: No cervical adenopathy. Skin:     General: Skin is warm and dry. Coloration: Skin is not jaundiced or pale. Neurological:      General: No focal deficit present. Mental Status: She is alert and oriented to person, place, and time. Mental status is at baseline. Cranial Nerves: No cranial nerve deficit. Motor: No weakness. Gait: Gait normal.   Psychiatric:         Mood and Affect: Mood normal.         Behavior: Behavior normal.         Thought Content: Thought content normal.         Judgment: Judgment normal.   There is no evidence of recurrent breast cancer with no masses nipple change or discharge bilaterally and no lymphadenopathy or hepatosplenomegaly. LABS/IMAGING    No lab data was done today. PATHOLOGY/GENETICS    Infiltrating ductal carcinoma of the right breast.  She has been offered genetic counseling and testing but has deferred it. ASSESSMENT and PLAN    1. Infiltrating ductal carcinoma of the breast status post lumpectomy for pT1b, pN 0 breast cancer. She is status post radiation therapy and continues on Arimidex. She is now a little more than 2 years after the diagnosis. We will see her again in 6 months. 2.  Osteoporosis. The patient has refused Prolia and bisphosphonates. She takes calcium only in her diet and vitamin D by mouth. She does do regular weightbearing exercise. I have encouraged her to rethink her calcium and vitamin D supplementation. Repeat DEXA scan was ordered for November.     3.  Recent COVID with syncopal episode and collapse with fall without fracture. She will return to see us in 6 months time. She knows to call if she has any change in her status, questions or concerns.     Kary Hopkins MD 10/25/2022 9:39 AM

## 2022-10-25 NOTE — PATIENT INSTRUCTIONS
Reviewed recent medical history. Discussed Osteopenia and continued use of Calcium and Vitamin D (Calcium from diet) and weight bearing exercise. Due for repeat Dexa Scan at the end of November. Order placed for Dexa Scan. Continue Arimidex as directed.   Return to see MD in 6 months

## 2022-11-28 ENCOUNTER — HOSPITAL ENCOUNTER (OUTPATIENT)
Dept: WOMENS IMAGING | Age: 62
Discharge: HOME | End: 2022-11-28
Payer: COMMERCIAL

## 2022-11-28 DIAGNOSIS — M85.88 OSTEOPENIA OF LUMBAR SPINE: ICD-10-CM

## 2022-11-28 PROCEDURE — 77080 DXA BONE DENSITY AXIAL: CPT

## 2022-11-30 ENCOUNTER — HOSPITAL ENCOUNTER (OUTPATIENT)
Dept: RADIATION ONCOLOGY | Age: 62
Discharge: HOME | End: 2022-11-30
Attending: RADIOLOGY
Payer: COMMERCIAL

## 2022-11-30 VITALS
SYSTOLIC BLOOD PRESSURE: 127 MMHG | DIASTOLIC BLOOD PRESSURE: 55 MMHG | WEIGHT: 129.6 LBS | OXYGEN SATURATION: 99 % | TEMPERATURE: 98.4 F | RESPIRATION RATE: 16 BRPM | HEART RATE: 80 BPM | BODY MASS INDEX: 24.49 KG/M2

## 2022-11-30 PROCEDURE — 99212 OFFICE O/P EST SF 10 MIN: CPT | Performed by: NURSE PRACTITIONER

## 2022-11-30 ASSESSMENT — ENCOUNTER SYMPTOMS
BACK PAIN: 0
COUGH: 0
TROUBLE SWALLOWING: 0
BLOOD IN STOOL: 0
SHORTNESS OF BREATH: 0
ABDOMINAL PAIN: 0
NAUSEA: 0
RECTAL PAIN: 0
DIARRHEA: 0

## 2022-11-30 NOTE — PROGRESS NOTES
[facility], [address]  Phone: [phone]   Toll Free: [phone]   Fax: [phone]    RADIATION ONCOLOGY FOLLOW UP REPORT    PATIENT NAME:  Lenore Hill              : 1960  MEDICAL RECORD NO: [id number]    LOCATION: Radiation Oncology  [facility] NO: [id number]      PROVIDER:  Anthony Kim CNP    DATE OF SERVICE: 2022      FOLLOW UP PHYSICIANS: Juju Diana; WILIAN Bah - CNP      DIAGNOSIS: Q39.455 -- Malignant neoplasm of the axillary tail portion of the right female breast; Infiltrating ductal carcinoma, Grade 1 (with extenbsive intraductal component, DCIS intermediate grade); pT1b pN0(sn) M0, Stage IA; ER+; MA+; Her2-; Oncotype Dx Score 16. Date of Diagnosis: 2020      ASSESSMENT:  No clinical or radiographic evidence or recurrent breast cancer. Mild lateral right breast tenderness with palpation. Continued anti-estrogen therapy per medical oncology. Osteoporosis confirmed by DEXA scan. PLAN:  Follow up with radiation oncology in 12 months. Reminded Jacques Jimenes that she can contact this office prior to next appointment with any questions or concerns. Continue the anastrozole as prescribed by medical oncology. Encouraged to take calcium and vitamin D supplements for the osteoporosis. Continue care in medical oncology and all other physicians/providers as scheduled. Continue surveillance and basic/preventive/supportive health care in accordance with clinical practice guidelines. RADIATION THERAPY TREATMENT HISTORY: Treatment Course Number: 1     Treatment Site (s) Modality Dose (cGy) From To Fractions/  Elapsed Days   Right Breast Mixed 6MV/15MV photons 4256 cGy 10/12/2020 2020 16/ 21   Right Breast Tumor Bed Boost 9Me-V electrons 1000 cGy 2020      Cumulative Dose to Right Breast Tumor Bed: 5256 cGy      CHAPERONE: Declined. Dr. Lila Rachel did the breast exam.      HISTORY OF PRESENT ILLNESS:   Stephani Toledo presented as a 68-year-old woman who developed pain in the right breast.  She underwent diagnostic mammogram, showing a 9 mm density in the axillary tail of the right breast.  Ultrasound showed a spiculated nodule corresponding to the mammographic abnormality, and was considered highly suggestive of malignancy. Subsequent biopsy 8/4/2020 showed invasive ductal carcinoma, ER positive, ER positive. HER-2/deysi was equivocal on the initial evaluation, but subsequent additional evaluation indicated that HER-2/deysi is non-amplified. Lennox Guy was seen for surgical consultation, and had a lengthy discussion about current clinical practice guidelines, treatment options and various aspects of the different management options. She elected to undergo breast conservation surgery. Final pathology showed a 9 mm invasive ductal carcinoma with DCIS involving 4 of the 7 examined blocks. Initial surgical margins were focally positive, and a reexcision was performed 8/27/2020 with no evidence of residual malignancy in the specimen. Oncotype DX testing was performed, returning with a recurrence score of 16 (low risk). Based on the clinical stage an Oncotype score, Lennox Guy has received a recommendation for adjuvant endocrine therapy for her systemic management. She was seen 9/28/2020 for evaluation and discussion regarding the role of radiation therapy in the management of her early stage right breast cancer. After reviewing the diagnosis, clinical practice guideline option/recommendations and details of the recommended radiation therapy, Lennox Guy was agreeable to proceeding with treatment. Lennox Guy tolerated her radiation therapy as expected. She developed some increased breast puffiness and tenderness, with a modest skin reaction. Not have any unexpected side effects during the course of therapy.       INTERVAL HISTORY:   Stephani Toledo returns to the radiation oncology clinic today, 11/30/2022, for a follow-up appointment. Ana Shearer denies any complaints today. Since she is now 2 years out from completing radiation therapy, Ana Shearer states the only remaining symptoms she notices include a \"warmth\" sensation of the right breast and even right scapula area, as well as some lingering tenderness along the outer portion of the right breast with palpation. Ana Shearer does have osteoporosis, confirmed by DEXA scan on 11/28/2022, but does not wish to take any supplements at this time. She does remain physically active and exercises routinely. Most recent mammogram on 08/03/2022 was unremarkable (see below). Ana Shearer also had a recent fall in August 2022 and hit her head. CT scans are unremarkable (see below). A bone scan from July 2022 is also unremarkable (see below). She does not have any new complaints related to her radiation therapy. Past Medical History:   Diagnosis Date    Acquired autoimmune hypothyroidism     Breast cancer ([facility] 08/2020    Diverticulitis     Dr. Federico Farah    History of kidney stones     History of therapeutic radiation     oct.  2020    Intestinal metaplasia of gastric mucosa     Dr. Federico Farah    Invasive ductal carcinoma of breast, right ([facility] 08/05/2020    Osteopenia 2006       Past Surgical History:   Procedure Laterality Date    BREAST LUMPECTOMY Right 8/18/2020    RIGHT BREAST LUMPECTOMY AND SENTINEL LYMPH NODE BIOPSY performed by Xiomara Rivas MD at [facility] LUMPECTOMY Right 8/27/2020    RE-EXCISION ANTERIOR MARGIN RIGHT BREAST performed by Xiomara Rivas MD at [address] [facility]  [id number]    x2    COLONOSCOPY  08/2018    Dr. Evert Palacios Left 06/02/2017    With left ureteroscopy, basket retrieval of stone fragments, left ureterenoscopy, basket retrieval of renal stones, and left ureter stent placement--Dr Lisa Smith    LAPAROSCOPY  [id number]    pelvic    TAMIKO US GUID NDL BIOPSY RIGHT  8/4/2020 TAMIKO [address] BIOPSY RIGHT 8/4/2020 [address]LL[address]    MT BIOPSY OF BREAST, NEEDLE CORE Right 08/04/2020    idc    UPPER GASTROINTESTINAL ENDOSCOPY  2010    US GUIDED NEEDLE LOC OF RIGHT BREAST  8/18/2020    US GUIDED NEEDLE LOC OF RIGHT BREAST 8/18/2020 [facility]       MEDICATIONS:   Current Outpatient Medications   Medication Sig Dispense Refill    anastrozole (ARIMIDEX) 1 MG tablet Take 1 tablet by mouth in the morning. 30 tablet 11    Cholecalciferol 400 UNIT TABS tablet Take 2,000 Units by mouth in the morning. thyroid (ARMOUR) 15 MG tablet Take 15 mg by mouth daily Alternate every other day with 30 mg       No current facility-administered medications for this encounter. TESTS:  RADIOLOGIC STUDIES:   11/28/2022: DEXA Bone Density Axial Skeleton:  Impression       This patient has osteoporosis using the World Health Organization criteria. The patient is at high risk for fracture. Recommendations:   Therapy recommendations need to be tailored to each individual patient. Using the [address]) FRAX absolute fracture algorithm, the [address] [place] [address] recommends beginning pharmacological therapy in postmenopausal women    and men over the age of 48 with a 8 year probability of a hip fracture of >3% OR with the 10 year probability of a major osteoporotic fracture of >20%. Please reconsider testing based on risk factors. Currently, Medicare will only reimburse for a central DEXA examination every two years, unless the patient is on chronic glucocorticoid therapy. 08/10/2022: Chest XR:  Impression   1. Hyperinflation of the lungs with flattening of the hemidiaphragms that can relate to chronic lung disease or COPD         08/10/2022: CT Maxillofacial WO Contrast:  Impression   1. No acute fracture of the facial bones. 08/10/2022: CT Head WO Contrast:  Impression   1. No acute intracranial abnormality.      08/03/2022: Mammogram SCARLETT Digital Screen W or WO CAD Bilateral:  Impression   No mammographic evidence of malignancy. A 1 year screening mammogram is    recommended. A result letter will be sent to the patient. She will also receive a    reminder 1 month prior to her next mammogram.       BI-RADS CATEGORY 2: BENIGN FINDINGS. Management Recommendation: Routine annual mammography. LABORATORY STUDIES:    CBC:   Lab Results   Component Value Date/Time    WBC 5.8 08/10/2022 12:04 PM    RBC 4.30 08/10/2022 12:04 PM    RBC 0-5 06/01/2017 10:06 AM    HGB 13.7 08/10/2022 12:04 PM    HCT 42.0 08/10/2022 12:04 PM    MCV 97.7 08/10/2022 12:04 PM    MCH 31.9 08/10/2022 12:04 PM    MCHC 32.6 08/10/2022 12:04 PM    RDW 12.9 06/01/2017 11:37 AM     08/10/2022 12:04 PM    MPV 9.1 08/10/2022 41:30 PM     MetabolicPanel:  Lab Results   Component Value Date/Time     08/10/2022 12:04 PM    K 4.4 08/10/2022 12:04 PM     08/10/2022 12:04 PM    CO2 24 08/10/2022 12:04 PM    BUN 18 08/10/2022 12:04 PM    CREATININE 0.5 08/10/2022 12:04 PM    LABGLOM >90 08/10/2022 12:04 PM    GLUCOSE 120 08/10/2022 12:04 PM    PROT 7.8 08/10/2022 12:04 PM    LABALBU 4.4 08/10/2022 12:04 PM    CALCIUM 9.9 08/10/2022 12:04 PM    BILITOT 0.6 08/10/2022 12:04 PM    BILITOT NEGATIVE 06/01/2017 10:06 AM    ALKPHOS 112 08/10/2022 12:04 PM    AST 19 08/10/2022 12:04 PM    ALT 24 08/10/2022 12:04 PM     Onc labs: No results found for: PSA, CEA, LDH, AFP      Review of Systems   Constitutional:  Negative for activity change, appetite change, fatigue and unexpected weight change. HENT:  Negative for ear pain and trouble swallowing. Respiratory:  Negative for cough and shortness of breath. Cardiovascular:  Negative for chest pain and leg swelling. Gastrointestinal:  Negative for abdominal pain, blood in stool, diarrhea, nausea and rectal pain. Genitourinary:  Negative for dysuria, frequency, hematuria and urgency.    Musculoskeletal:  Negative for arthralgias, back pain and myalgias. Skin:  Negative for rash and wound. Neurological:  Positive for light-headedness. Negative for dizziness, weakness, numbness and headaches. Psychiatric/Behavioral:  Negative for confusion. A complete review of systems was performed and found to be negative except as presented above. EXAMINATION:   GENERAL: Well-developed adult female, alert and oriented ×3 in no obvious distress. Clear mentation with appropriate affect. VITAL SIGNS:  BP (!) 127/55   Pulse 80   Temp 98.4 °F (36.9 °C) (Infrared)   Resp 16   Wt 129 lb 9.6 oz (58.8 kg)   SpO2 99%   BMI 24.49 kg/m²   PAIN: 0/10  ECO  HEENT: Atraumatic, normocephalic. PERRL/EOMI; ears, nose and lips unremarkable on external examination. NECK: No JVD. No palpable cervical lymphadenopathy. THORAX: No palpable supraclavicular or axillary lymphadenopathy. LUNGS: Clear to auscultation. HEART: Non-tachycardic, regular rhythm. BREASTS: Noted breast asymmetry. Right/treated breast smaller than the left. The right breast also has some architectural change which accentuates the asymmetry. There is significant breast tenderness on the lateral right breast with palpation. There are underlying fibrocystic changes in both breasts. On the right side, there is some skin change most noticeable along the inferior breast with thinning of the epidermis and some thickening of underlying dermis. Again there are no suspicious findings. There is minimal edema/puffiness of the right breast which is most prominent inferiorly. ABDOMEN: Soft, nondistended, nontender with unremarkable bowel sounds. EXTREMITIES: No clubbing or cyanosis. NEUROLOGIC EXAMINATION: Cranial nerves grossly intact. No obvious focal neurologic deficits. SKIN: See above. Electronically signed by Janette Kim CNP on 22   Radiation Oncology    CC: WILIAN Romo CNP  ACC: Tumor Registry: [address]    This document was created using a voice-recognition program.  Computer generated transcription errors may be present.

## 2022-12-29 ENCOUNTER — OFFICE VISIT (OUTPATIENT)
Dept: FAMILY MEDICINE CLINIC | Age: 62
End: 2022-12-29
Payer: COMMERCIAL

## 2022-12-29 VITALS
HEIGHT: 61 IN | BODY MASS INDEX: 24.32 KG/M2 | WEIGHT: 128.8 LBS | TEMPERATURE: 98 F | HEART RATE: 82 BPM | DIASTOLIC BLOOD PRESSURE: 72 MMHG | RESPIRATION RATE: 16 BRPM | SYSTOLIC BLOOD PRESSURE: 118 MMHG | OXYGEN SATURATION: 97 %

## 2022-12-29 DIAGNOSIS — R05.9 COUGH, UNSPECIFIED TYPE: ICD-10-CM

## 2022-12-29 DIAGNOSIS — H65.191 ACUTE EFFUSION OF RIGHT EAR: ICD-10-CM

## 2022-12-29 DIAGNOSIS — R52 BODY ACHES: ICD-10-CM

## 2022-12-29 DIAGNOSIS — J10.1 INFLUENZA A: Primary | ICD-10-CM

## 2022-12-29 LAB
INFLUENZA A ANTIBODY: POSITIVE
INFLUENZA B ANTIBODY: NEGATIVE
Lab: 1234
QC PASS/FAIL: NORMAL
SARS-COV-2 RDRP RESP QL NAA+PROBE: NEGATIVE

## 2022-12-29 PROCEDURE — 99213 OFFICE O/P EST LOW 20 MIN: CPT | Performed by: NURSE PRACTITIONER

## 2022-12-29 PROCEDURE — 87635 SARS-COV-2 COVID-19 AMP PRB: CPT | Performed by: NURSE PRACTITIONER

## 2022-12-29 PROCEDURE — 87804 INFLUENZA ASSAY W/OPTIC: CPT | Performed by: NURSE PRACTITIONER

## 2022-12-29 RX ORDER — OSELTAMIVIR PHOSPHATE 75 MG/1
75 CAPSULE ORAL 2 TIMES DAILY
Qty: 10 CAPSULE | Refills: 0 | Status: SHIPPED | OUTPATIENT
Start: 2022-12-29 | End: 2023-01-03

## 2022-12-29 RX ORDER — PREDNISONE 20 MG/1
20 TABLET ORAL 2 TIMES DAILY
Qty: 10 TABLET | Refills: 0 | Status: SHIPPED | OUTPATIENT
Start: 2022-12-29 | End: 2023-01-03

## 2022-12-29 RX ORDER — GUAIFENESIN 600 MG/1
600 TABLET, EXTENDED RELEASE ORAL 2 TIMES DAILY
Qty: 30 TABLET | Refills: 0 | Status: SHIPPED | OUTPATIENT
Start: 2022-12-29 | End: 2023-01-13

## 2022-12-29 ASSESSMENT — PATIENT HEALTH QUESTIONNAIRE - PHQ9
SUM OF ALL RESPONSES TO PHQ QUESTIONS 1-9: 0
SUM OF ALL RESPONSES TO PHQ QUESTIONS 1-9: 0
SUM OF ALL RESPONSES TO PHQ9 QUESTIONS 1 & 2: 0
SUM OF ALL RESPONSES TO PHQ QUESTIONS 1-9: 0
2. FEELING DOWN, DEPRESSED OR HOPELESS: 0
SUM OF ALL RESPONSES TO PHQ QUESTIONS 1-9: 0
1. LITTLE INTEREST OR PLEASURE IN DOING THINGS: 0
DEPRESSION UNABLE TO ASSESS: FUNCTIONAL CAPACITY MOTIVATION LIMITS ACCURACY

## 2022-12-29 NOTE — PROGRESS NOTES
SUBJECTIVE:  Raul Dumont is a 57 y.o. y/o female that presents with Cough (Fever, fatigue, body ache for about 7 days )  . HPI:      Symptoms have been present for 3 day(s). Symptoms are unchanged since they initially started. Fever? No  Runny nose or congestion? Yes   Cough? Yes  Sore throat? Postnasal drainage   Headache, fatigue, joint pains, muscle aches? No  Shortness of breath/Wheezing? No  Nausea/Vomiting/Diarrhea? No  Double Sickening? No  Sick contacts? No  Known COVID exposures of RFs? No had covid 5 months ago   Smoker? No  Preexisting Respiratory conditions? No    Patient has tried otc meds with out improvement. Past Medical History:   Diagnosis Date    Acquired autoimmune hypothyroidism     [facility] 2020    Diverticulitis     Dr. Swathi Morales    History of kidney stones     History of therapeutic radiation     oct.  2020    Intestinal metaplasia of gastric mucosa     Dr. Swathi Morales    Invasive ductal carcinoma of breast, right ([facility] 2020    Osteopenia 2006       Social History     Socioeconomic History    Marital status:      Spouse name: Michelle Jauregui    Number of children: 2    Years of education: Not on file    Highest education level: Not on file   Occupational History    Not on file   Tobacco Use    Smoking status: Former     Packs/day: 1.00     Years: 20.00     Pack years: 20.00     Types: Cigarettes     Quit date: 1997     Years since quittin.0    Smokeless tobacco: Never   Vaping Use    Vaping Use: Never used   Substance and Sexual Activity    Alcohol use: No    Drug use: No    Sexual activity: Not on file   Other Topics Concern    Not on file   Social History Narrative    Not on file     Social Determinants of Health     Financial Resource Strain: Low Risk     Difficulty of Paying Living Expenses: Not hard at all   Food Insecurity: No Food Insecurity    Worried About [address] in the Last Year: Never true    [address] in the Last Year: Never true   Transportation Needs: Not on file   Physical Activity: Not on file   Stress: Not on file   Social Connections: Not on file   Intimate Partner Violence: Not on file   Housing Stability: Not on file       Family History   Problem Relation Age of Onset    Other Mother         fuches corneal dystrophy, scleroderma    High Blood Pressure Mother         pulmonary htn    Osteoporosis Mother     Supraventricular tachycardia  Mother     High Blood Pressure Father     Lung Cancer Father     Other Sister         fuches corneal dystrophy    Other Sister         fuches corneal dystrophy    Heart Disease Sister     Pacemaker Sister     Other Sister         fuches corneal dystrophy    Hypothyroidism Sister     Other Sister         hypothyroidism, raynauds    Diabetes Maternal Grandmother     Pancreatic Cancer Maternal Grandmother [de-identified]    Other Maternal Grandfather         fuches corneal dystrophy    Pancreatic Cancer Maternal Uncle            OBJECTIVE:  /72   Pulse 82   Temp 98 °F (36.7 °C)   Resp 16   Ht 5' 1\" (1.549 m)   Wt 128 lb 12.8 oz (58.4 kg)   SpO2 97%   BMI 24.34 kg/m²   General appearance: well hydrated and ill-appearing. ENT exam reveals - right TM fluid noted, neck has bilateral anterior cervical nodes enlarged, pharynx erythematous without exudate, sinuses nontender, and nasal mucosa congested. CVS exam: normal rate, regular rhythm, normal S1, S2, no murmurs, rubs, clicks or gallops. Chest:clear to auscultation, no wheezes, rales or rhonchi, symmetric air entry. Abdominal exam: soft, nontender, nondistended, no masses or organomegaly. Extremities:  No clubbing, cyanosis or edema  Skin exam - normal coloration and turgor, no rashes, no suspicious skin lesions noted. Psych -  Affect appropriate. Thought process is normal without evidence of depression or psychosis. Good insight and appropriae interaction. Cognition and memory appear to be intact.         ASSESSMENT & PLAN  Taz Shaver was seen today for cough. Diagnoses and all orders for this visit:    Influenza A  -     oseltamivir (TAMIFLU) 75 MG capsule; Take 1 capsule by mouth 2 times daily for 5 days    Cough, unspecified type  mucinex  -     POCT COVID-19 Rapid, NAAT negative   -     POCT Influenza A/B positive Flu A     Body aches  tylenol  -     POCT COVID-19 Rapid, NAAT  -     POCT Influenza A/B    Acute effusion of right ear  prednisone  Other orders  -     predniSONE (DELTASONE) 20 MG tablet; Take 1 tablet by mouth 2 times daily for 5 days  -     guaiFENesin (MUCINEX) 600 MG extended release tablet; Take 1 tablet by mouth 2 times daily for 15 days  Pt in agreement with plan     Return if symptoms worsen or fail to improve.     -Patient advised to call immediately or go to ER if any worsening of symptoms  -Patient counseled on conservative care including fluids, rest and OTC meds    Jazmín Hyman received counseling on the following healthy behaviors: medication adherence  Reviewed prior labs and health maintenance. Continue current medications, diet and exercise. Discussed use, benefit, and side effects of prescribed medications. Barriers to medication compliance addressed. Patient given educational materials - see patient instructions. All patient questions answered. Patient voiced understanding. I have reviewed this patient's history, habits, and medication list and have updated the chart where appropriate.

## 2023-01-11 ENCOUNTER — TELEPHONE (OUTPATIENT)
Dept: FAMILY MEDICINE CLINIC | Age: 63
End: 2023-01-11

## 2023-01-11 NOTE — TELEPHONE ENCOUNTER
Future Appointments   Date Time Provider Andres Amalia   3/15/2023  8:20 AM Lulú Nunez, APRN - [address] [facility] AM [place] [address]   3/30/2023  9:00 AM STR ULTRASOUND RM 2 STRZ US STR Radiolog   4/25/2023  3:00 PM Michelle Cedillo MD N Oncology [facility] [place]   12/4/2023  9:30 AM SCHEDULE, Sung Smith RAD NURSE [facility]   12/4/2023  9:45 AM Betty Bello, [address] [facility]     Patient informed, verbally understood.

## 2023-03-14 DIAGNOSIS — E03.9 HYPOTHYROIDISM, UNSPECIFIED TYPE: Primary | ICD-10-CM

## 2023-03-14 RX ORDER — LEVOTHYROXINE AND LIOTHYRONINE 9.5; 2.25 UG/1; UG/1
15 TABLET ORAL DAILY
Qty: 30 TABLET | Status: CANCELLED | OUTPATIENT
Start: 2023-03-14

## 2023-03-14 RX ORDER — LEVOTHYROXINE AND LIOTHYRONINE 9.5; 2.25 UG/1; UG/1
15 TABLET ORAL DAILY
Qty: 30 TABLET | Refills: 2 | Status: SHIPPED | OUTPATIENT
Start: 2023-03-14

## 2023-03-14 NOTE — TELEPHONE ENCOUNTER
The labs are a year old at this point. I like ot check them yearly to make sure dosages are correct. Yes, I can send in a 2 month supply of the current dose thanks!

## 2023-03-14 NOTE — TELEPHONE ENCOUNTER
Pt asking for refill of armour thyroid, I do not see a TSH on her for over a year, please have her update her labs first to make sure the current dose is still accurate before refilling. The labs are non fasting.

## 2023-03-14 NOTE — TELEPHONE ENCOUNTER
Patient has been informed and voiced understanding but asks that you please send in a rx for the thyroid medication for 2 months when she is coming in to see you. Patient states that she had labs don March 2022 for Dr Dhruv Roy and that they were supposed to have sent all of her information over for you to review and asked if that would be sufficient as far as recent labs?     Future Appointments   Date Time Provider Andres Holland   3/30/2023  9:00 AM STR ULTRASOUND RM 2 STRZ US STR Radiolog   5/2/2023  7:40 AM WILIAN Christian - CNP [facility] - [address] [place] [address]   5/9/2023 10:30 AM Mervin Whatley MD N Oncology [facility] - [address] [place] [address]   12/4/2023  9:30 AM SCHEDULE, Jaelyn Perkins RAD NURSE [facility]   12/4/2023  9:45 AM Avani Menon, [address] [place] [address]

## 2023-03-14 NOTE — TELEPHONE ENCOUNTER
Patient requesting the following to be dispensed as 45 tablets with refills   Please approve or refuse Rx request:  Requested Prescriptions     Pending Prescriptions Disp Refills    thyroid (ARMOUR) 15 MG tablet 30 tablet      Sig: Take 1 tablet by mouth daily Alternate every other day with 30 mg       Next appointment:  3/15/2023  Medication previously prescribed by last PCP

## 2023-03-28 DIAGNOSIS — E03.9 HYPOTHYROIDISM, UNSPECIFIED TYPE: ICD-10-CM

## 2023-03-28 RX ORDER — LEVOTHYROXINE AND LIOTHYRONINE 9.5; 2.25 UG/1; UG/1
15 TABLET ORAL DAILY
Qty: 45 TABLET | Refills: 1 | Status: SHIPPED | OUTPATIENT
Start: 2023-03-28

## 2023-05-02 ENCOUNTER — OFFICE VISIT (OUTPATIENT)
Dept: FAMILY MEDICINE CLINIC | Age: 63
End: 2023-05-02

## 2023-05-02 VITALS
TEMPERATURE: 98.3 F | HEIGHT: 61 IN | HEART RATE: 66 BPM | OXYGEN SATURATION: 96 % | RESPIRATION RATE: 16 BRPM | SYSTOLIC BLOOD PRESSURE: 118 MMHG | WEIGHT: 129.2 LBS | DIASTOLIC BLOOD PRESSURE: 78 MMHG | BODY MASS INDEX: 24.39 KG/M2

## 2023-05-02 DIAGNOSIS — Z00.00 INITIAL MEDICARE ANNUAL WELLNESS VISIT: ICD-10-CM

## 2023-05-02 DIAGNOSIS — Z13.220 SCREENING CHOLESTEROL LEVEL: ICD-10-CM

## 2023-05-02 DIAGNOSIS — R30.0 DYSURIA: ICD-10-CM

## 2023-05-02 DIAGNOSIS — E03.4 HYPOTHYROIDISM DUE TO ACQUIRED ATROPHY OF THYROID: ICD-10-CM

## 2023-05-02 DIAGNOSIS — Z17.1 CARCINOMA OF UPPER-OUTER QUADRANT OF RIGHT BREAST IN FEMALE, ESTROGEN RECEPTOR NEGATIVE (HCC): ICD-10-CM

## 2023-05-02 DIAGNOSIS — R73.9 ELEVATED BLOOD SUGAR: ICD-10-CM

## 2023-05-02 DIAGNOSIS — Z13.31 DEPRESSION SCREENING NEGATIVE: ICD-10-CM

## 2023-05-02 DIAGNOSIS — N64.4 BREAST PAIN, RIGHT: ICD-10-CM

## 2023-05-02 DIAGNOSIS — Z00.00 WELCOME TO MEDICARE PREVENTIVE VISIT: Primary | ICD-10-CM

## 2023-05-02 DIAGNOSIS — Z86.000 HISTORY OF CARCINOMA IN SITU OF BREAST: ICD-10-CM

## 2023-05-02 DIAGNOSIS — C50.411 CARCINOMA OF UPPER-OUTER QUADRANT OF RIGHT BREAST IN FEMALE, ESTROGEN RECEPTOR NEGATIVE (HCC): ICD-10-CM

## 2023-05-02 SDOH — ECONOMIC STABILITY: FOOD INSECURITY: WITHIN THE PAST 12 MONTHS, YOU WORRIED THAT YOUR FOOD WOULD RUN OUT BEFORE YOU GOT MONEY TO BUY MORE.: NEVER TRUE

## 2023-05-02 SDOH — ECONOMIC STABILITY: INCOME INSECURITY: HOW HARD IS IT FOR YOU TO PAY FOR THE VERY BASICS LIKE FOOD, HOUSING, MEDICAL CARE, AND HEATING?: NOT HARD AT ALL

## 2023-05-02 SDOH — ECONOMIC STABILITY: FOOD INSECURITY: WITHIN THE PAST 12 MONTHS, THE FOOD YOU BOUGHT JUST DIDN'T LAST AND YOU DIDN'T HAVE MONEY TO GET MORE.: NEVER TRUE

## 2023-05-02 SDOH — ECONOMIC STABILITY: HOUSING INSECURITY
IN THE LAST 12 MONTHS, WAS THERE A TIME WHEN YOU DID NOT HAVE A STEADY PLACE TO SLEEP OR SLEPT IN A SHELTER (INCLUDING NOW)?: NO

## 2023-05-02 ASSESSMENT — PATIENT HEALTH QUESTIONNAIRE - PHQ9
SUM OF ALL RESPONSES TO PHQ9 QUESTIONS 1 & 2: 0
SUM OF ALL RESPONSES TO PHQ QUESTIONS 1-9: 0
2. FEELING DOWN, DEPRESSED OR HOPELESS: 0
SUM OF ALL RESPONSES TO PHQ QUESTIONS 1-9: 0
SUM OF ALL RESPONSES TO PHQ QUESTIONS 1-9: 0
1. LITTLE INTEREST OR PLEASURE IN DOING THINGS: 0
SUM OF ALL RESPONSES TO PHQ QUESTIONS 1-9: 0

## 2023-05-02 ASSESSMENT — LIFESTYLE VARIABLES
HOW MANY STANDARD DRINKS CONTAINING ALCOHOL DO YOU HAVE ON A TYPICAL DAY: PATIENT DOES NOT DRINK
HOW OFTEN DO YOU HAVE A DRINK CONTAINING ALCOHOL: NEVER

## 2023-05-02 ASSESSMENT — VISUAL ACUITY
OD_CC: 20/20
OS_CC: 20/25

## 2023-05-02 NOTE — PROGRESS NOTES
Medicare Annual Wellness Visit    Angelica Altamirano is here for Medicare AWV (Welcome to medicare, spot on right breast  urinary frequency for a couple days )    Assessment & Plan   Welcome to Medicare preventive visit  -     EKG 12 Lead - Clinic Performed  -     Hemoglobin A1C; Future  Initial Medicare annual wellness visit  Depression screening negative  Hypothyroidism due to acquired atrophy of thyroid  -     T3, Free; Future  -     T3, Reverse; Future  Carcinoma of upper-outer quadrant of right breast in female, estrogen receptor negative ([facility]  -consulted with Dr. Sj Oropeza Oncologist regarding plan of care for right breast pain and dense tissue,   -U/S and mammogram of right breast ordered. Dysuria  -r/o UTI pt to go to  for urine sample, could not urinate in the office   -     Urinalysis with Reflex to Culture  Screening cholesterol level  -     Lipid Panel; Future  Elevated blood sugar  -pt had an elevated blood sugar in August r-/o T2DM  -     Lipid Panel; Future  -     Hemoglobin A1C; Future  Breast pain, right  -as above  -r/o breast lesion   -     US BREAST COMPLETE RIGHT; Future  -     TAMIKO DIGITAL DIAGNOSTIC W OR WO CAD BILATERAL; Future  History of carcinoma in situ of breast  -     US BREAST COMPLETE RIGHT; Future  -     TAMIKO DIGITAL DIAGNOSTIC W OR WO CAD BILATERAL; Future    Pt in agreement with plan     Recommendations for Preventive Services Due: see orders and patient instructions/AVS.  Recommended screening schedule for the next 5-10 years is provided to the patient in written form: see Patient Instructions/AVS.     Return in about 1 year (around 5/2/2024) for MAW.      Subjective   The following acute and/or chronic problems were also addressed today:  Pt had a right lumpectomy 3 years ago for breast carcinoma and has bene experiencing some right breast tenderness , which is not essentially new but now can see a fine line in the tissue of her right breast in the right upper quad where her

## 2023-05-03 ENCOUNTER — TELEPHONE (OUTPATIENT)
Dept: FAMILY MEDICINE CLINIC | Age: 63
End: 2023-05-03

## 2023-05-03 DIAGNOSIS — N64.4 BREAST PAIN: Primary | ICD-10-CM

## 2023-05-03 DIAGNOSIS — Z86.000 HISTORY OF CARCINOMA IN SITU OF BREAST: ICD-10-CM

## 2023-05-03 LAB
AVERAGE GLUCOSE: 108 MG/DL
CHOLESTEROL/HDL RATIO: 2.4 RATIO
CHOLESTEROL: 213 MG/DL
HBA1C MFR BLD: 5.4 % (ref 4.2–5.6)
HDLC SERPL-MCNC: 89 MG/DL
LDL CHOLESTEROL CALCULATED: 113 MG/DL
LDL/HDL RATIO: 1.3 RATIO
T3 FREE: 2.9 PG/ML (ref 2.2–4.2)
TRIGL SERPL-MCNC: 53 MG/DL
TSH SERPL DL<=0.05 MIU/L-ACNC: 1.63 UIU/ML (ref 0.4–4.1)
VLDLC SERPL CALC-MCNC: 11 MG/DL

## 2023-05-03 NOTE — TELEPHONE ENCOUNTER
I did place a new order for right breast  SCARLETT diagnostic mammogram ,  as that is the only order I could find, please verify with them this is correct and if needing just the diagnostic right breast (without SCARLETT) mammogram please get the order number  from them. Not the ICD codes thanks!

## 2023-05-03 NOTE — TELEPHONE ENCOUNTER
Per Emelia John from [facility] the current mammogram order needs canceled and re-ordered as right breast diagnostic mammogram to include dx codes  Breast pain, right (N64.4 [ICD-10-CM]); History of carcinoma in situ of breast (Z86.000 [ICD-10-CM])    Patient scheduled for [phone]

## 2023-05-04 ENCOUNTER — HOSPITAL ENCOUNTER (OUTPATIENT)
Dept: WOMENS IMAGING | Age: 63
Discharge: HOME | End: 2023-05-04
Payer: MEDICARE

## 2023-05-04 ENCOUNTER — TELEPHONE (OUTPATIENT)
Dept: FAMILY MEDICINE CLINIC | Age: 63
End: 2023-05-04

## 2023-05-04 DIAGNOSIS — R30.0 DYSURIA: Primary | ICD-10-CM

## 2023-05-04 DIAGNOSIS — Z86.000 HISTORY OF CARCINOMA IN SITU OF BREAST: ICD-10-CM

## 2023-05-04 DIAGNOSIS — N64.4 BREAST PAIN, RIGHT: ICD-10-CM

## 2023-05-04 DIAGNOSIS — N64.4 BREAST PAIN: ICD-10-CM

## 2023-05-04 PROCEDURE — 76642 ULTRASOUND BREAST LIMITED: CPT

## 2023-05-04 PROCEDURE — G0279 TOMOSYNTHESIS, MAMMO: HCPCS

## 2023-05-04 NOTE — TELEPHONE ENCOUNTER
I called and spoke ot Ashwini Cooper and advised her that the order for UA was sent in and she can go to any [facility] and get it taken care of.

## 2023-05-04 NOTE — TELEPHONE ENCOUNTER
----- Message from WILIAN Dean CNP sent at 5/4/2023  3:41 PM EDT -----  Let pt know her mammogram and u/s do not identify any malignancy, she does have dense breast tissue. F/U with Oncology as scheduled .

## 2023-05-04 NOTE — TELEPHONE ENCOUNTER
Patient informed and verbalized understanding. No questions at this time.  Patient is requesting an order for a UA ( she states she was unable to give a specimen at her appt)

## 2023-05-06 LAB — T3 REVERSE: 12.8 NG/DL (ref 9–27)

## 2023-05-08 ENCOUNTER — TELEPHONE (OUTPATIENT)
Dept: FAMILY MEDICINE CLINIC | Age: 63
End: 2023-05-08

## 2023-05-08 DIAGNOSIS — E03.9 HYPOTHYROIDISM, UNSPECIFIED TYPE: ICD-10-CM

## 2023-05-08 RX ORDER — LEVOTHYROXINE AND LIOTHYRONINE 9.5; 2.25 UG/1; UG/1
15 TABLET ORAL DAILY
Qty: 45 TABLET | Refills: 3 | Status: SHIPPED | OUTPATIENT
Start: 2023-05-08 | End: 2023-06-13 | Stop reason: SDUPTHER

## 2023-05-08 NOTE — TELEPHONE ENCOUNTER
----- Message from WILIAN Mccrary CNP sent at 5/8/2023  7:50 AM EDT -----  Let pt know her total cholesterol is elevated at 213 and should be  around 200, her LDL just a little increased at 113, no need for cholesterol meds yet , recommend high fiber low cholesterol diet. Her TSH is normal at 1.63 continue current dose of thyroid medication.  HGB A1C normal at 5.4

## 2023-05-08 NOTE — TELEPHONE ENCOUNTER
----- Message from WILIAN Otero CNP sent at 5/8/2023  2:53 PM EDT -----  Let pt now her urine was not suggestive if a UTI, ask how she has been feeling any UTI symptoms any longer?  Thanks

## 2023-05-09 ENCOUNTER — OFFICE VISIT (OUTPATIENT)
Dept: ONCOLOGY | Age: 63
End: 2023-05-09
Payer: MEDICARE

## 2023-05-09 ENCOUNTER — HOSPITAL ENCOUNTER (OUTPATIENT)
Dept: INFUSION THERAPY | Age: 63
Discharge: HOME | End: 2023-05-09
Payer: MEDICARE

## 2023-05-09 VITALS
HEART RATE: 71 BPM | SYSTOLIC BLOOD PRESSURE: 122 MMHG | OXYGEN SATURATION: 99 % | TEMPERATURE: 97.7 F | DIASTOLIC BLOOD PRESSURE: 55 MMHG | RESPIRATION RATE: 16 BRPM

## 2023-05-09 VITALS
HEIGHT: 61 IN | BODY MASS INDEX: 23.94 KG/M2 | WEIGHT: 126.8 LBS | HEART RATE: 71 BPM | TEMPERATURE: 97.7 F | RESPIRATION RATE: 16 BRPM | OXYGEN SATURATION: 99 % | SYSTOLIC BLOOD PRESSURE: 122 MMHG | DIASTOLIC BLOOD PRESSURE: 55 MMHG

## 2023-05-09 DIAGNOSIS — Z17.1 MALIGNANT NEOPLASM OF NIPPLE OF RIGHT BREAST IN FEMALE, ESTROGEN RECEPTOR NEGATIVE (HCC): Primary | ICD-10-CM

## 2023-05-09 DIAGNOSIS — C50.011 MALIGNANT NEOPLASM OF NIPPLE OF RIGHT BREAST IN FEMALE, ESTROGEN RECEPTOR NEGATIVE (HCC): Primary | ICD-10-CM

## 2023-05-09 PROCEDURE — G8420 CALC BMI NORM PARAMETERS: HCPCS | Performed by: INTERNAL MEDICINE

## 2023-05-09 PROCEDURE — 3017F COLORECTAL CA SCREEN DOC REV: CPT | Performed by: INTERNAL MEDICINE

## 2023-05-09 PROCEDURE — G8427 DOCREV CUR MEDS BY ELIG CLIN: HCPCS | Performed by: INTERNAL MEDICINE

## 2023-05-09 PROCEDURE — 1036F TOBACCO NON-USER: CPT | Performed by: INTERNAL MEDICINE

## 2023-05-09 PROCEDURE — 99211 OFF/OP EST MAY X REQ PHY/QHP: CPT

## 2023-05-09 PROCEDURE — 99214 OFFICE O/P EST MOD 30 MIN: CPT | Performed by: INTERNAL MEDICINE

## 2023-05-09 ASSESSMENT — ENCOUNTER SYMPTOMS
RHINORRHEA: 0
CONSTIPATION: 0
ABDOMINAL PAIN: 0
TROUBLE SWALLOWING: 0
NAUSEA: 0
DIARRHEA: 0
VOMITING: 0
COUGH: 0
SHORTNESS OF BREATH: 0

## 2023-05-09 NOTE — PATIENT INSTRUCTIONS
Reviewed labs and recent medical history. Reviewed her area of concern in her right breast and discussed her recent imaging. Discussed a referral for oncology rehab and using her compression sleeve as well as compression device. Referral placed. She will look at her medications to see if she needs refills and let us know.   Return to see MD in 6 months

## 2023-05-09 NOTE — PROGRESS NOTES
calcium in her diet. She is doing weightbearing exercise. She will have her normal mammograms in the fall. 2.  Osteoporosis. This has been confirmed by DEXA scan. She has previously refused Prolia and bisphosphonates. She takes vitamin D but not calcium. She does do regular weightbearing exercise. 3.  Continued pain in her right breast.  We have refer her back to oncology rehab for suggestions for care. She is quite tender to touch. She will return to the office in 6 months time. She knows to call if she has any change in her status, questions or concerns.         Nusrat Villafana MD 5/9/2023 10:43 AM

## 2023-07-06 ENCOUNTER — TELEPHONE (OUTPATIENT)
Dept: FAMILY MEDICINE CLINIC | Age: 63
End: 2023-07-06

## 2023-07-07 ENCOUNTER — TELEPHONE (OUTPATIENT)
Dept: FAMILY MEDICINE CLINIC | Age: 63
End: 2023-07-07

## 2023-07-07 PROBLEM — Z00.00 INITIAL MEDICARE ANNUAL WELLNESS VISIT: Status: ACTIVE | Noted: 2023-07-07

## 2023-07-31 RX ORDER — ANASTROZOLE 1 MG/1
1 TABLET ORAL DAILY
Qty: 30 TABLET | Refills: 3 | Status: SHIPPED | OUTPATIENT
Start: 2023-07-31

## 2023-08-06 PROBLEM — Z00.00 INITIAL MEDICARE ANNUAL WELLNESS VISIT: Status: RESOLVED | Noted: 2023-07-07 | Resolved: 2023-08-06

## 2023-11-15 ENCOUNTER — OFFICE VISIT (OUTPATIENT)
Dept: ONCOLOGY | Age: 63
End: 2023-11-15
Payer: MEDICARE

## 2023-11-15 ENCOUNTER — HOSPITAL ENCOUNTER (OUTPATIENT)
Dept: INFUSION THERAPY | Age: 63
Discharge: HOME | End: 2023-11-15
Payer: MEDICARE

## 2023-11-15 VITALS
BODY MASS INDEX: 24.51 KG/M2 | RESPIRATION RATE: 16 BRPM | WEIGHT: 129.8 LBS | HEART RATE: 80 BPM | OXYGEN SATURATION: 98 % | SYSTOLIC BLOOD PRESSURE: 127 MMHG | DIASTOLIC BLOOD PRESSURE: 60 MMHG | TEMPERATURE: 97.4 F | HEIGHT: 61 IN

## 2023-11-15 VITALS
TEMPERATURE: 97.4 F | DIASTOLIC BLOOD PRESSURE: 60 MMHG | SYSTOLIC BLOOD PRESSURE: 127 MMHG | RESPIRATION RATE: 16 BRPM | OXYGEN SATURATION: 98 % | HEART RATE: 80 BPM

## 2023-11-15 DIAGNOSIS — Z17.1 MALIGNANT NEOPLASM OF NIPPLE OF RIGHT BREAST IN FEMALE, ESTROGEN RECEPTOR NEGATIVE (HCC): Primary | ICD-10-CM

## 2023-11-15 DIAGNOSIS — Z12.31 ENCOUNTER FOR SCREENING MAMMOGRAM FOR MALIGNANT NEOPLASM OF BREAST: ICD-10-CM

## 2023-11-15 DIAGNOSIS — C50.011 MALIGNANT NEOPLASM OF NIPPLE OF RIGHT BREAST IN FEMALE, ESTROGEN RECEPTOR NEGATIVE (HCC): Primary | ICD-10-CM

## 2023-11-15 PROCEDURE — 99213 OFFICE O/P EST LOW 20 MIN: CPT | Performed by: INTERNAL MEDICINE

## 2023-11-15 PROCEDURE — G8484 FLU IMMUNIZE NO ADMIN: HCPCS | Performed by: INTERNAL MEDICINE

## 2023-11-15 PROCEDURE — 3017F COLORECTAL CA SCREEN DOC REV: CPT | Performed by: INTERNAL MEDICINE

## 2023-11-15 PROCEDURE — G8427 DOCREV CUR MEDS BY ELIG CLIN: HCPCS | Performed by: INTERNAL MEDICINE

## 2023-11-15 PROCEDURE — G8399 PT W/DXA RESULTS DOCUMENT: HCPCS | Performed by: INTERNAL MEDICINE

## 2023-11-15 PROCEDURE — 1090F PRES/ABSN URINE INCON ASSESS: CPT | Performed by: INTERNAL MEDICINE

## 2023-11-15 PROCEDURE — G8420 CALC BMI NORM PARAMETERS: HCPCS | Performed by: INTERNAL MEDICINE

## 2023-11-15 PROCEDURE — 99211 OFF/OP EST MAY X REQ PHY/QHP: CPT

## 2023-11-15 PROCEDURE — 1123F ACP DISCUSS/DSCN MKR DOCD: CPT | Performed by: INTERNAL MEDICINE

## 2023-11-15 PROCEDURE — 1036F TOBACCO NON-USER: CPT | Performed by: INTERNAL MEDICINE

## 2023-11-15 RX ORDER — IBUPROFEN 600 MG/1
TABLET ORAL
COMMUNITY
Start: 2023-10-03

## 2023-11-15 RX ORDER — ANASTROZOLE 1 MG/1
1 TABLET ORAL DAILY
Qty: 30 TABLET | Refills: 3 | Status: SHIPPED | OUTPATIENT
Start: 2023-11-15

## 2023-11-15 ASSESSMENT — ENCOUNTER SYMPTOMS
CONSTIPATION: 0
RHINORRHEA: 0
VOMITING: 0
ABDOMINAL PAIN: 0
SHORTNESS OF BREATH: 0
NAUSEA: 0
COUGH: 0
TROUBLE SWALLOWING: 0
DIARRHEA: 0

## 2023-11-15 NOTE — PATIENT INSTRUCTIONS
MMG ordered for Jan 2024. She will start back on Vitamin D 500 IU daily. Follow up in 6 mos for MD visit. She will return to the clinic early/ call and make an appointment if MMG returns with abnormal findings.

## 2023-11-15 NOTE — PROGRESS NOTES
laboratory data was obtained today        PATHOLOGY/GENETICS    Infiltrating ductal carcinoma of the right breast.  She has been offered genetic counseling and testing but has declined. ASSESSMENT and PLAN    1. Breast cancer, ER positive, WI positive, HER2 not over amplified by FISH status post lumpectomy, radiation therapy and continuing on Arimidex. He is doing well. She is taking MND and trying to get enough calcium in her diet. She is doing weightbearing exercise. She will have her normal mammograms in the fall. 2.  Osteoporosis. This has been confirmed by DEXA scan. She has previously refused Prolia and bisphosphonates. She takes vitamin D but not calcium. She does do regular weightbearing exercise. 3.  Continued pain in her right breast.  We have refer her back to oncology rehab for suggestions for care. She is quite tender to touch. She knows to call if she has any change in her status, questions or concerns. MMG ordered for Jan 2024. She will start back on Vitamin D 500 IU daily. Seer database reviewed with her, 5-year survival of 99.3% was discussed with her with localized disease. Orders Placed This Encounter   Procedures    TAMIKO DIGITAL SCREEN W OR WO CAD BILATERAL     No follow-up provider specified.     Follow up in  6 mos for MD visit  Hannah Juárez MD 11/15/2023 11:23 AM

## 2023-12-11 ENCOUNTER — HOSPITAL ENCOUNTER (OUTPATIENT)
Dept: RADIATION ONCOLOGY | Age: 63
Discharge: HOME | End: 2023-12-11
Attending: RADIOLOGY
Payer: MEDICARE

## 2023-12-11 VITALS
WEIGHT: 133 LBS | BODY MASS INDEX: 25.13 KG/M2 | OXYGEN SATURATION: 99 % | SYSTOLIC BLOOD PRESSURE: 121 MMHG | RESPIRATION RATE: 16 BRPM | DIASTOLIC BLOOD PRESSURE: 59 MMHG | TEMPERATURE: 97.9 F | HEART RATE: 75 BPM

## 2023-12-11 PROCEDURE — 99212 OFFICE O/P EST SF 10 MIN: CPT | Performed by: RADIOLOGY

## 2023-12-11 PROCEDURE — 99213 OFFICE O/P EST LOW 20 MIN: CPT | Performed by: RADIOLOGY

## 2023-12-11 ASSESSMENT — ENCOUNTER SYMPTOMS
NAUSEA: 0
BACK PAIN: 0
BLOOD IN STOOL: 0
RECTAL PAIN: 0
SHORTNESS OF BREATH: 0
TROUBLE SWALLOWING: 0
ABDOMINAL PAIN: 0
DIARRHEA: 0
COUGH: 0

## 2023-12-11 NOTE — PROGRESS NOTES
[address] [facility],5Th [address], [address] [place] [address]  Phone: [phone]   Toll Free: [phone]   Fax: [phone]    RADIATION ONCOLOGY FOLLOW UP REPORT    PATIENT NAME:  Aniceto Tatum              : 1960  MEDICAL RECORD NO: [id number]    LOCATION: Radiation Oncology  CSN NO: [id number]      PROVIDER: Hemant Wylie PhD, MD    DATE OF SERVICE: 2023      FOLLOW UP PHYSICIANS: Dr. Lilian Boas; Carlos Alberto Alexander, APRN - CNP       DIAGNOSIS: C50.611 -- Malignant neoplasm of the axillary tail portion of the right female breast; Infiltrating ductal carcinoma, Grade 1 (with extenbsive intraductal component, DCIS intermediate grade); pT1b pN0(sn) M0, Stage IA; ER+; NE+; Her2-; Oncotype Dx Score 16. Date of Diagnosis: 2020      ASSESSMENT:  No clinical or mammographic evidence of recurrent or new breast cancer now more than 3 years post-diagnosis of early-stage right breast cancer with extensive intraductal component. No unexpected complications related to radiation therapy. PLAN:  Radiation oncology follow-up in 1 year. As usual, Ar Simon was reminded to call and arrange for an earlier appointment if needed for any problems questions or concerns.   Continue aromatase inhibitor per medical oncology  Continue care in medical oncology and with other physicians/providers  Continue surveillance and basic/preventive/supportive health care in accordance with clinical practice guidelines      RADIATION THERAPY TREATMENT HISTORY:   Treatment Course Number: 1     Treatment Site (s) Modality Dose (cGy) From To Fractions/  Elapsed Days   Right Breast Mixed 6MV/15MV photons 4256 cGy 10/12/2020 2020 16/ 21   Right Breast Tumor Bed Boost 9Me-V electrons 1000 cGy 2020      Cumulative Dose to Right Breast Tumor Bed: 5256 cGy      CHAPERONE: Declined      HISTORY OF PRESENT ILLNESS:   Brenita Schwab presented as a 77-year-old woman

## 2024-01-15 ENCOUNTER — HOSPITAL ENCOUNTER (OUTPATIENT)
Dept: WOMENS IMAGING | Age: 64
Discharge: HOME | End: 2024-01-15
Attending: INTERNAL MEDICINE
Payer: MEDICARE

## 2024-01-15 DIAGNOSIS — C50.011 MALIGNANT NEOPLASM OF NIPPLE OF RIGHT BREAST IN FEMALE, ESTROGEN RECEPTOR NEGATIVE (HCC): ICD-10-CM

## 2024-01-15 DIAGNOSIS — Z17.1 MALIGNANT NEOPLASM OF NIPPLE OF RIGHT BREAST IN FEMALE, ESTROGEN RECEPTOR NEGATIVE (HCC): ICD-10-CM

## 2024-01-15 DIAGNOSIS — Z12.31 ENCOUNTER FOR SCREENING MAMMOGRAM FOR MALIGNANT NEOPLASM OF BREAST: ICD-10-CM

## 2024-01-15 DIAGNOSIS — Z12.31 VISIT FOR SCREENING MAMMOGRAM: ICD-10-CM

## 2024-01-15 PROCEDURE — 77063 BREAST TOMOSYNTHESIS BI: CPT

## 2024-01-17 ENCOUNTER — TELEPHONE (OUTPATIENT)
Dept: FAMILY MEDICINE CLINIC | Age: 64
End: 2024-01-17

## 2024-01-17 DIAGNOSIS — R92.2 DENSE BREASTS: Primary | ICD-10-CM

## 2024-01-17 DIAGNOSIS — R92.30 DENSE BREASTS: Primary | ICD-10-CM

## 2024-01-17 NOTE — TELEPHONE ENCOUNTER
Pt informed and understanding with no further questions at this time.     Pt is agreeable to getting the ABUS

## 2024-01-17 NOTE — TELEPHONE ENCOUNTER
----- Message from WILIAN Bartlett CNP sent at 1/17/2024  1:24 PM EST -----  Let pt know her mammogram is normal but die to the fact that she has  dense breast tissue, this patient is a candidate for automated whole breast screening ultrasound (ABUS) to aid in breast cancer detection. Is pt agreeable to the ABUS screening also?

## 2024-03-20 RX ORDER — ANASTROZOLE 1 MG/1
1 TABLET ORAL DAILY
Qty: 30 TABLET | Refills: 0 | Status: SHIPPED | OUTPATIENT
Start: 2024-03-20

## 2024-04-02 ENCOUNTER — OFFICE VISIT (OUTPATIENT)
Dept: FAMILY MEDICINE CLINIC | Age: 64
End: 2024-04-02
Payer: MEDICARE

## 2024-04-02 VITALS
BODY MASS INDEX: 25.53 KG/M2 | HEART RATE: 96 BPM | HEIGHT: 61 IN | DIASTOLIC BLOOD PRESSURE: 76 MMHG | OXYGEN SATURATION: 99 % | WEIGHT: 135.2 LBS | RESPIRATION RATE: 16 BRPM | TEMPERATURE: 97 F | SYSTOLIC BLOOD PRESSURE: 122 MMHG

## 2024-04-02 DIAGNOSIS — L65.9 HAIR LOSS: ICD-10-CM

## 2024-04-02 DIAGNOSIS — Z13.1 SCREENING FOR DIABETES MELLITUS: ICD-10-CM

## 2024-04-02 DIAGNOSIS — R10.2 PELVIC CRAMPING: ICD-10-CM

## 2024-04-02 DIAGNOSIS — R35.0 URINARY FREQUENCY: Primary | ICD-10-CM

## 2024-04-02 DIAGNOSIS — R73.01 IMPAIRED FASTING BLOOD SUGAR: ICD-10-CM

## 2024-04-02 DIAGNOSIS — E03.4 HYPOTHYROIDISM DUE TO ACQUIRED ATROPHY OF THYROID: ICD-10-CM

## 2024-04-02 DIAGNOSIS — M81.0 AGE-RELATED OSTEOPOROSIS WITHOUT CURRENT PATHOLOGICAL FRACTURE: ICD-10-CM

## 2024-04-02 DIAGNOSIS — M81.6 LOCALIZED OSTEOPOROSIS WITHOUT CURRENT PATHOLOGICAL FRACTURE: ICD-10-CM

## 2024-04-02 DIAGNOSIS — Z85.3 HX: BREAST CANCER: ICD-10-CM

## 2024-04-02 DIAGNOSIS — Z13.220 SCREENING FOR HYPERLIPIDEMIA: ICD-10-CM

## 2024-04-02 LAB
BILIRUBIN URINE: NEGATIVE
BLOOD URINE, POC: NEGATIVE
CHARACTER, URINE: CLEAR
COLOR, URINE: YELLOW
GLUCOSE URINE: NEGATIVE MG/DL
KETONES, URINE: NEGATIVE
LEUKOCYTE CLUMPS, URINE: NEGATIVE
NITRITE, URINE: NEGATIVE
PH, URINE: 7 (ref 5–9)
PROTEIN, URINE: NEGATIVE MG/DL
SPECIFIC GRAVITY, URINE: 1.01 (ref 1–1.03)
UROBILINOGEN, URINE: 0.2 EU/DL (ref 0–1)

## 2024-04-02 PROCEDURE — 1090F PRES/ABSN URINE INCON ASSESS: CPT | Performed by: NURSE PRACTITIONER

## 2024-04-02 PROCEDURE — 3017F COLORECTAL CA SCREEN DOC REV: CPT | Performed by: NURSE PRACTITIONER

## 2024-04-02 PROCEDURE — 1036F TOBACCO NON-USER: CPT | Performed by: NURSE PRACTITIONER

## 2024-04-02 PROCEDURE — G8419 CALC BMI OUT NRM PARAM NOF/U: HCPCS | Performed by: NURSE PRACTITIONER

## 2024-04-02 PROCEDURE — 99214 OFFICE O/P EST MOD 30 MIN: CPT | Performed by: NURSE PRACTITIONER

## 2024-04-02 PROCEDURE — G8427 DOCREV CUR MEDS BY ELIG CLIN: HCPCS | Performed by: NURSE PRACTITIONER

## 2024-04-02 PROCEDURE — G8399 PT W/DXA RESULTS DOCUMENT: HCPCS | Performed by: NURSE PRACTITIONER

## 2024-04-02 PROCEDURE — 1123F ACP DISCUSS/DSCN MKR DOCD: CPT | Performed by: NURSE PRACTITIONER

## 2024-04-02 ASSESSMENT — PATIENT HEALTH QUESTIONNAIRE - PHQ9
2. FEELING DOWN, DEPRESSED OR HOPELESS: NOT AT ALL
1. LITTLE INTEREST OR PLEASURE IN DOING THINGS: NOT AT ALL
SUM OF ALL RESPONSES TO PHQ9 QUESTIONS 1 & 2: 0
SUM OF ALL RESPONSES TO PHQ QUESTIONS 1-9: 0

## 2024-04-02 NOTE — PROGRESS NOTES
Age-related osteoporosis without current pathological fracture  -     Vitamin D 25 Hydroxy; Future    Impaired fasting blood sugar  -     Hemoglobin A1C; Future  R/o T2DM  Other orders  -     POCT Urinalysis No Micro (Auto) reviewed today and normal     Pt in agreement with plan     Return if symptoms worsen or fail to improve.      -Start above treatments  -Urine culture was not sent today  -Patient advised to contact our office immediately if symptoms worsen or persist  -Patient counseled on conservative care including fluids, rest and OTC meds      All patient questions answered.  Patient voiced understanding.

## 2024-05-01 LAB
ALBUMIN: 4.8 G/DL (ref 3.5–5.2)
ALK PHOSPHATASE: 134 U/L (ref 40–140)
ALT SERPL-CCNC: 34 U/L (ref 5–40)
ANION GAP SERPL CALCULATED.3IONS-SCNC: 9 MEQ/L (ref 7–16)
AST SERPL-CCNC: 22 U/L (ref 9–40)
BASOPHILS ABSOLUTE: 0.03 K/UL (ref 0–0.2)
BASOPHILS RELATIVE PERCENT: 0.6 %
BILIRUB SERPL-MCNC: 0.5 MG/DL
BUN BLDV-MCNC: 17 MG/DL (ref 8–23)
CALCIUM SERPL-MCNC: 9.8 MG/DL (ref 8.5–10.5)
CHLORIDE BLD-SCNC: 102 MEQ/L (ref 95–107)
CHOLESTEROL, TOTAL: 218 MG/DL
CHOLESTEROL/HDL RATIO: 2.3 RATIO
CO2: 30 MEQ/L (ref 19–31)
CREAT SERPL-MCNC: 0.74 MG/DL (ref 0.6–1.3)
EGFR IF NONAFRICAN AMERICAN: 90 ML/MIN/1.73
EOSINOPHILS ABSOLUTE: 0.06 K/UL (ref 0–0.5)
EOSINOPHILS RELATIVE PERCENT: 1.3 %
ESTIMATED AVERAGE GLUCOSE: 105 MG/DL
GLUCOSE: 98 MG/DL (ref 70–99)
HBA1C MFR BLD: 5.3 % (ref 4.2–5.6)
HCT VFR BLD CALC: 40.2 % (ref 34–45)
HDLC SERPL-MCNC: 95 MG/DL
HEMOGLOBIN: 13.3 G/DL (ref 11.5–15.5)
IMMATURE GRANS (ABS): 0.01
IMMATURE GRANULOCYTES %: 0.2 %
LDL CHOLESTEROL: 109 MG/DL
LDL/HDL RATIO: 1.1 RATIO
LYMPHOCYTES ABSOLUTE: 1.21 K/UL (ref 1–4)
LYMPHOCYTES RELATIVE PERCENT: 25.9 %
MCH RBC QN AUTO: 31.1 PG (ref 25–33)
MCHC RBC AUTO-ENTMCNC: 33.1 G/DL (ref 31–36)
MCV RBC AUTO: 93.9 FL (ref 80–99)
MONOCYTES ABSOLUTE: 0.41 K/UL (ref 0.2–1)
MONOCYTES RELATIVE PERCENT: 8.8 %
NEUTROPHILS ABSOLUTE: 2.96 K/UL (ref 1.5–7.5)
NEUTROPHILS RELATIVE PERCENT: 63.2 %
PDW BLD-RTO: 12 % (ref 11.5–15)
PLATELET # BLD: 236 K/UL (ref 130–400)
PMV BLD AUTO: 9.9 FL (ref 9.3–13)
POTASSIUM SERPL-SCNC: 4.7 MEQ/L (ref 3.5–5.4)
RBC # BLD: 4.28 M/UL (ref 3.8–5.4)
SODIUM BLD-SCNC: 141 MEQ/L (ref 133–146)
T3 FREE: 4.1 PG/ML (ref 2.2–4.2)
TOTAL PROTEIN: 7.4 G/DL (ref 6.1–8.3)
TRIGL SERPL-MCNC: 71 MG/DL
TSH SERPL DL<=0.05 MIU/L-ACNC: 3.14 UIU/ML (ref 0.4–4.1)
VITAMIN D 25-HYDROXY: 39 NG/ML
VLDLC SERPL CALC-MCNC: 14 MG/DL
WBC # BLD: 4.7 K/UL (ref 3.5–11)

## 2024-05-02 ENCOUNTER — TELEPHONE (OUTPATIENT)
Dept: FAMILY MEDICINE CLINIC | Age: 64
End: 2024-05-02

## 2024-05-02 NOTE — TELEPHONE ENCOUNTER
----- Message from Magdi Evans, DO sent at 5/1/2024  9:23 PM EDT -----  Please let pt know that labs are stable and appropriate. Let me know if questions, thanks!

## 2024-05-06 ENCOUNTER — OFFICE VISIT (OUTPATIENT)
Dept: FAMILY MEDICINE CLINIC | Age: 64
End: 2024-05-06
Payer: MEDICARE

## 2024-05-06 VITALS
HEIGHT: 61 IN | TEMPERATURE: 97.9 F | DIASTOLIC BLOOD PRESSURE: 74 MMHG | HEART RATE: 76 BPM | WEIGHT: 131.8 LBS | BODY MASS INDEX: 24.88 KG/M2 | SYSTOLIC BLOOD PRESSURE: 124 MMHG | RESPIRATION RATE: 16 BRPM | OXYGEN SATURATION: 98 %

## 2024-05-06 DIAGNOSIS — Z00.00 MEDICARE ANNUAL WELLNESS VISIT, SUBSEQUENT: Primary | ICD-10-CM
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
## 2024-05-06 DIAGNOSIS — E06.3 HASHIMOTO'S DISEASE: ICD-10-CM

## 2024-05-06 DIAGNOSIS — M85.80 OSTEOPENIA, UNSPECIFIED LOCATION: ICD-10-CM

## 2024-05-06 PROCEDURE — G8399 PT W/DXA RESULTS DOCUMENT: HCPCS | Performed by: NURSE PRACTITIONER

## 2024-05-06 PROCEDURE — G8427 DOCREV CUR MEDS BY ELIG CLIN: HCPCS | Performed by: NURSE PRACTITIONER

## 2024-05-06 PROCEDURE — 1123F ACP DISCUSS/DSCN MKR DOCD: CPT | Performed by: NURSE PRACTITIONER

## 2024-05-06 PROCEDURE — 1090F PRES/ABSN URINE INCON ASSESS: CPT | Performed by: NURSE PRACTITIONER

## 2024-05-06 PROCEDURE — G8420 CALC BMI NORM PARAMETERS: HCPCS | Performed by: NURSE PRACTITIONER

## 2024-05-06 PROCEDURE — 99213 OFFICE O/P EST LOW 20 MIN: CPT | Performed by: NURSE PRACTITIONER

## 2024-05-06 PROCEDURE — 3017F COLORECTAL CA SCREEN DOC REV: CPT | Performed by: NURSE PRACTITIONER

## 2024-05-06 PROCEDURE — G0438 PPPS, INITIAL VISIT: HCPCS | Performed by: NURSE PRACTITIONER

## 2024-05-06 PROCEDURE — 1036F TOBACCO NON-USER: CPT | Performed by: NURSE PRACTITIONER

## 2024-05-06 RX ORDER — THYROID 15 MG/1
30 TABLET ORAL DAILY
Qty: 180 TABLET | Refills: 3 | Status: SHIPPED | OUTPATIENT
Start: 2024-05-06

## 2024-05-06 RX ORDER — ANASTROZOLE 1 MG/1
1 TABLET ORAL DAILY
Qty: 90 TABLET | Refills: 3 | Status: SHIPPED | OUTPATIENT
Start: 2024-05-06

## 2024-05-06 SDOH — ECONOMIC STABILITY: INCOME INSECURITY: HOW HARD IS IT FOR YOU TO PAY FOR THE VERY BASICS LIKE FOOD, HOUSING, MEDICAL CARE, AND HEATING?: NOT HARD AT ALL

## 2024-05-06 SDOH — ECONOMIC STABILITY: FOOD INSECURITY: WITHIN THE PAST 12 MONTHS, YOU WORRIED THAT YOUR FOOD WOULD RUN OUT BEFORE YOU GOT MONEY TO BUY MORE.: NEVER TRUE

## 2024-05-06 SDOH — ECONOMIC STABILITY: FOOD INSECURITY: WITHIN THE PAST 12 MONTHS, THE FOOD YOU BOUGHT JUST DIDN'T LAST AND YOU DIDN'T HAVE MONEY TO GET MORE.: NEVER TRUE

## 2024-05-06 ASSESSMENT — PATIENT HEALTH QUESTIONNAIRE - PHQ9
SUM OF ALL RESPONSES TO PHQ QUESTIONS 1-9: 0
2. FEELING DOWN, DEPRESSED OR HOPELESS: NOT AT ALL
SUM OF ALL RESPONSES TO PHQ9 QUESTIONS 1 & 2: 0
SUM OF ALL RESPONSES TO PHQ QUESTIONS 1-9: 0
1. LITTLE INTEREST OR PLEASURE IN DOING THINGS: NOT AT ALL
SUM OF ALL RESPONSES TO PHQ QUESTIONS 1-9: 0
SUM OF ALL RESPONSES TO PHQ QUESTIONS 1-9: 0

## 2024-05-06 ASSESSMENT — LIFESTYLE VARIABLES
HOW OFTEN DO YOU HAVE A DRINK CONTAINING ALCOHOL: NEVER
HOW MANY STANDARD DRINKS CONTAINING ALCOHOL DO YOU HAVE ON A TYPICAL DAY: PATIENT DOES NOT DRINK

## 2024-05-06 NOTE — PROGRESS NOTES
Cholecalciferol 400 UNIT TABS tablet Take 5 tablets by mouth daily  Patient not taking: Reported on 11/15/2023  Provider, MD Damon Talbert (Including outside providers/suppliers regularly involved in providing care):   Patient Care Team:  Shade Smith APRN - CNP as PCP - General (Nurse Practitioner)  Shade Smith APRN - CNP as PCP - Empaneled Provider     Reviewed and updated this visit:  Tobacco  Allergies  Meds  Problems  Med Hx  Surg Hx  Soc Hx  Fam Hx

## 2024-05-14 ENCOUNTER — HOSPITAL ENCOUNTER (OUTPATIENT)
Dept: INFUSION THERAPY | Age: 64
Discharge: HOME | End: 2024-05-14
Payer: MEDICARE

## 2024-05-14 ENCOUNTER — OFFICE VISIT (OUTPATIENT)
Dept: ONCOLOGY | Age: 64
End: 2024-05-14
Payer: MEDICARE

## 2024-05-14 VITALS
RESPIRATION RATE: 16 BRPM | HEART RATE: 79 BPM | SYSTOLIC BLOOD PRESSURE: 121 MMHG | OXYGEN SATURATION: 98 % | TEMPERATURE: 97.8 F | DIASTOLIC BLOOD PRESSURE: 81 MMHG

## 2024-05-14 VITALS
WEIGHT: 132.6 LBS | SYSTOLIC BLOOD PRESSURE: 121 MMHG | HEIGHT: 61 IN | OXYGEN SATURATION: 98 % | RESPIRATION RATE: 16 BRPM | DIASTOLIC BLOOD PRESSURE: 81 MMHG | HEART RATE: 79 BPM | TEMPERATURE: 97.8 F | BODY MASS INDEX: 25.04 KG/M2

## 2024-05-14 DIAGNOSIS — Z17.1 CARCINOMA OF UPPER-OUTER QUADRANT OF RIGHT BREAST IN FEMALE, ESTROGEN RECEPTOR NEGATIVE (HCC): Primary | ICD-10-CM

## 2024-05-14 DIAGNOSIS — C50.411 CARCINOMA OF UPPER-OUTER QUADRANT OF RIGHT BREAST IN FEMALE, ESTROGEN RECEPTOR NEGATIVE (HCC): Primary | ICD-10-CM

## 2024-05-14 PROBLEM — Z85.3 HISTORY OF RIGHT BREAST CANCER: Status: ACTIVE | Noted: 2024-05-14

## 2024-05-14 PROCEDURE — 1090F PRES/ABSN URINE INCON ASSESS: CPT | Performed by: INTERNAL MEDICINE

## 2024-05-14 PROCEDURE — G8419 CALC BMI OUT NRM PARAM NOF/U: HCPCS | Performed by: INTERNAL MEDICINE

## 2024-05-14 PROCEDURE — 99211 OFF/OP EST MAY X REQ PHY/QHP: CPT

## 2024-05-14 PROCEDURE — 3017F COLORECTAL CA SCREEN DOC REV: CPT | Performed by: INTERNAL MEDICINE

## 2024-05-14 PROCEDURE — 1123F ACP DISCUSS/DSCN MKR DOCD: CPT | Performed by: INTERNAL MEDICINE

## 2024-05-14 PROCEDURE — G8399 PT W/DXA RESULTS DOCUMENT: HCPCS | Performed by: INTERNAL MEDICINE

## 2024-05-14 PROCEDURE — G8427 DOCREV CUR MEDS BY ELIG CLIN: HCPCS | Performed by: INTERNAL MEDICINE

## 2024-05-14 PROCEDURE — 99214 OFFICE O/P EST MOD 30 MIN: CPT | Performed by: INTERNAL MEDICINE

## 2024-05-14 PROCEDURE — 1036F TOBACCO NON-USER: CPT | Performed by: INTERNAL MEDICINE

## 2024-05-14 ASSESSMENT — ENCOUNTER SYMPTOMS
SHORTNESS OF BREATH: 0
ABDOMINAL PAIN: 0
NAUSEA: 0
CONSTIPATION: 0
TROUBLE SWALLOWING: 0
DIARRHEA: 0
COUGH: 0
RHINORRHEA: 0

## 2024-05-14 NOTE — PROGRESS NOTES
[facility] [place] [facility]  [facility]  [address]  [address]  [place] [id number]  Dept: [phone]  Loc: [phone]     Marilee Lind  1960   No ref. provider found   Shade Smith APRN - CNP       Marilee Lind is a 63 y.o.  female breast cancer    CHIEF COMPLAINT  Chief Complaint   Patient presents with    Follow-up     Malignant neoplasm of nipple of right breast in female, estrogen receptor negative          HISTORY OF PRESENT ILLNESS    7/31/2020.  Annual screening mammogram showed 0.9 cm mass in right breast.    8/4/2020.  Core biopsy showed low-grade invasive ductal carcinoma the right breast.  Tumor cells were estrogen receptor positive, progesterone receptor positive and HER2 was 2+ by IHC but FISH was negative for amplification.    8/18/2020.  Lumpectomy and sentinel lymph node biopsy by Dr. Millan revealed pathology with invasive ductal adenocarcinoma, grade 1.  The anterior margin which was skin was focally positive for invasive carcinoma.  DCIS was 1 mm from the skin margin.  Staging showed pT1b, pN0 (SN).  [place] lymph node biopsy was negative for malignancy.     8/27/2020.  Reexcision showed no evidence of residual malignancy.    Family history is positive for multiple family members with pancreatic cancer.  Previously she declined genetic counseling but said that she would think about it and discuss it with her family members before making a final decision.     8/4/2020 through 11/6/2020.  Radiation therapy to the breast receiving 5256 cGy in 20 fractions.    November, 2020.  Started adjuvant hormonal therapy with aromatase inhibitor.    1/25/2022.  Patient is tolerating her hormonal treatment well with no significant arthralgia and minimal hot flashes.    July 2021.  Annual screening mammogram showed benign findings.  Current mammogram is being ordered     Mrs. Lind was moving some boxes and probably injured her right shoulder.

## 2024-07-17 ENCOUNTER — OFFICE VISIT (OUTPATIENT)
Dept: FAMILY MEDICINE CLINIC | Age: 64
End: 2024-07-17
Payer: MEDICARE

## 2024-07-17 VITALS
WEIGHT: 131.4 LBS | BODY MASS INDEX: 24.81 KG/M2 | TEMPERATURE: 97.9 F | HEIGHT: 61 IN | DIASTOLIC BLOOD PRESSURE: 70 MMHG | SYSTOLIC BLOOD PRESSURE: 126 MMHG | RESPIRATION RATE: 18 BRPM | HEART RATE: 76 BPM | OXYGEN SATURATION: 99 %

## 2024-07-17 DIAGNOSIS — J01.90 ACUTE RHINOSINUSITIS: Primary | ICD-10-CM

## 2024-07-17 LAB
Lab: 1234
QC PASS/FAIL: NORMAL
SARS-COV-2 RDRP RESP QL NAA+PROBE: NEGATIVE
STREPTOCOCCUS A RNA: NEGATIVE

## 2024-07-17 PROCEDURE — 87651 STREP A DNA AMP PROBE: CPT | Performed by: FAMILY MEDICINE

## 2024-07-17 PROCEDURE — 1090F PRES/ABSN URINE INCON ASSESS: CPT | Performed by: FAMILY MEDICINE

## 2024-07-17 PROCEDURE — 3017F COLORECTAL CA SCREEN DOC REV: CPT | Performed by: FAMILY MEDICINE

## 2024-07-17 PROCEDURE — 1036F TOBACCO NON-USER: CPT | Performed by: FAMILY MEDICINE

## 2024-07-17 PROCEDURE — 87635 SARS-COV-2 COVID-19 AMP PRB: CPT | Performed by: FAMILY MEDICINE

## 2024-07-17 PROCEDURE — G8427 DOCREV CUR MEDS BY ELIG CLIN: HCPCS | Performed by: FAMILY MEDICINE

## 2024-07-17 PROCEDURE — G8420 CALC BMI NORM PARAMETERS: HCPCS | Performed by: FAMILY MEDICINE

## 2024-07-17 PROCEDURE — G8399 PT W/DXA RESULTS DOCUMENT: HCPCS | Performed by: FAMILY MEDICINE

## 2024-07-17 PROCEDURE — 1123F ACP DISCUSS/DSCN MKR DOCD: CPT | Performed by: FAMILY MEDICINE

## 2024-07-17 PROCEDURE — 99214 OFFICE O/P EST MOD 30 MIN: CPT | Performed by: FAMILY MEDICINE

## 2024-07-17 RX ORDER — CEFDINIR 300 MG/1
300 CAPSULE ORAL 2 TIMES DAILY
Qty: 20 CAPSULE | Refills: 0 | Status: SHIPPED | OUTPATIENT
Start: 2024-07-17 | End: 2024-07-27

## 2024-07-17 NOTE — PROGRESS NOTES
Chief Complaint   Patient presents with    Other     Sore throat, redness, chills, fatigued, post nasal drip  White spot on tonsil   Started 5 days ago      History obtained from the patient.    SUBJECTIVE:  Marilee Lind is a 64 y.o. female that presents today for    -URI type sxs:   Nasal congestion  Drainage  Cough  Sore throat  White spot on R tonsil area  No fever  No SOB  Some fatigue  Feeling worse    Fever - No  Runny nose or congestion -  Yes   Cough -  Yes  Sore throat -  Yes  Headache, fatigue, joint pains, muscle aches -  No  Double Sickening - Yes  Shortness of breath/Wheezing? -  No  Nausea/Vomiting/Diarrhea?  No  Sick contacts - No  Maxillary Tooth Pain -  Yes  Treatment tried and response - otc meds, no better        Current Outpatient Medications   Medication Sig Dispense Refill    cefdinir (OMNICEF) 300 MG capsule Take 1 capsule by mouth 2 times daily for 10 days 20 capsule 0    thyroid (ARMOUR) 15 MG tablet Take 2 tablets by mouth daily 180 tablet 3    anastrozole (ARIMIDEX) 1 MG tablet Take 1 tablet by mouth daily 90 tablet 3    Cholecalciferol 400 UNIT TABS tablet Take 5 tablets by mouth daily       No current facility-administered medications for this visit.     Orders Placed This Encounter   Medications    cefdinir (OMNICEF) 300 MG capsule     Sig: Take 1 capsule by mouth 2 times daily for 10 days     Dispense:  20 capsule     Refill:  0       All medications reviewed and reconciled, including OTC and herbal medications. Updated list given to patient.       Patient Active Problem List    Diagnosis Date Noted    Hashimoto's disease 10/11/2022     Priority: Medium    History of right breast cancer 05/14/2024    S/P lumpectomy and sln bx, right breast 08/18/2020    Carcinoma of upper-outer quadrant of right breast in female, estrogen receptor negative (HCC) 08/12/2020    Ureteral stone     Renal stones     Hypothyroidism due to acquired atrophy of thyroid 07/25/2016    Osteopenia

## 2024-07-19 LAB — BACTERIA THROAT AEROBE CULT: NORMAL

## 2024-11-11 ENCOUNTER — HOSPITAL ENCOUNTER (OUTPATIENT)
Dept: WOMENS IMAGING | Age: 64
Discharge: HOME | End: 2024-11-11

## 2024-11-11 DIAGNOSIS — C50.411 CARCINOMA OF UPPER-OUTER QUADRANT OF RIGHT BREAST IN FEMALE, ESTROGEN RECEPTOR POSITIVE (HCC): Primary | ICD-10-CM

## 2024-11-11 DIAGNOSIS — R92.30 DENSE BREASTS: ICD-10-CM

## 2024-11-11 DIAGNOSIS — Z17.0 CARCINOMA OF UPPER-OUTER QUADRANT OF RIGHT BREAST IN FEMALE, ESTROGEN RECEPTOR POSITIVE (HCC): Primary | ICD-10-CM

## 2024-11-15 ENCOUNTER — HOSPITAL ENCOUNTER (OUTPATIENT)
Dept: INFUSION THERAPY | Age: 64
Discharge: HOME | End: 2024-11-15
Payer: MEDICARE

## 2024-11-15 ENCOUNTER — OFFICE VISIT (OUTPATIENT)
Dept: ONCOLOGY | Age: 64
End: 2024-11-15

## 2024-11-15 VITALS
OXYGEN SATURATION: 98 % | TEMPERATURE: 98.1 F | HEIGHT: 61 IN | RESPIRATION RATE: 16 BRPM | DIASTOLIC BLOOD PRESSURE: 61 MMHG | SYSTOLIC BLOOD PRESSURE: 136 MMHG | BODY MASS INDEX: 24.55 KG/M2 | WEIGHT: 130 LBS | HEART RATE: 80 BPM

## 2024-11-15 VITALS
HEART RATE: 80 BPM | TEMPERATURE: 98.1 F | RESPIRATION RATE: 16 BRPM | SYSTOLIC BLOOD PRESSURE: 136 MMHG | DIASTOLIC BLOOD PRESSURE: 61 MMHG

## 2024-11-15 DIAGNOSIS — R93.89 ABNORMAL FINDINGS ON DIAGNOSTIC IMAGING OF OTHER SPECIFIED BODY STRUCTURES: ICD-10-CM

## 2024-11-15 DIAGNOSIS — Z79.811 AROMATASE INHIBITOR USE: ICD-10-CM

## 2024-11-15 DIAGNOSIS — Z12.31 ENCOUNTER FOR SCREENING MAMMOGRAM FOR MALIGNANT NEOPLASM OF BREAST: Primary | ICD-10-CM

## 2024-11-15 PROCEDURE — 99211 OFF/OP EST MAY X REQ PHY/QHP: CPT

## 2024-11-15 RX ORDER — ANASTROZOLE 1 MG/1
1 TABLET ORAL DAILY
Qty: 90 TABLET | Refills: 3 | Status: SHIPPED | OUTPATIENT
Start: 2024-11-15

## 2024-11-15 NOTE — PATIENT INSTRUCTIONS
Orders Placed This Encounter   Procedures    TAMIKO DIGITAL SCREEN W OR WO CAD BILATERAL    DEXA BONE DENSITY AXIAL SKELETON    CBC with Auto Differential    Hepatic Function Panel    POC PANEL BMP W/IOCA     Return in about 11 weeks (around 1/31/2025).MD+ review the results of labs and imaging obtained 1 week prior to the scheduled clinic visit.  Plan is to alternate between MRI breast yearly  and MMG yearly.

## 2024-11-15 NOTE — PROGRESS NOTES
stones.  She monitors her diet with an sy to get adequate calcium through diet.  She has mild arthralgias    MONITORING PARAMETERS    Mammography, bone scans, MRI    PAST MEDICAL HISTORY  Past Medical History:   Diagnosis Date    Acquired autoimmune hypothyroidism     Breast cancer (HCC) 2020    [facility]    Diverticulitis     Dr. West    History of kidney stones     History of therapeutic radiation     oct. 2020    Intestinal metaplasia of gastric mucosa     Dr. West    Invasive ductal carcinoma of breast, right ([facility]) 2020    Osteopenia 2006        REVIEW OF SYSTEMS  Review of Systems   14 points ROS negative except as mentioned above.       FAMILY HISTORY  Family History   Problem Relation Age of Onset    Other Mother         fuches corneal dystrophy, scleroderma    High Blood Pressure Mother         pulmonary htn    Osteoporosis Mother     Supraventricular tachycardia  Mother     High Blood Pressure Father     Lung Cancer Father     Other Sister         fuches corneal dystrophy    Other Sister         fuches corneal dystrophy    Heart Disease Sister     Pacemaker Sister     Other Sister         fuches corneal dystrophy    Hypothyroidism Sister     Other Sister         hypothyroidism, raynauds    Diabetes Maternal Grandmother     Pancreatic Cancer Maternal Grandmother 80    Other Maternal Grandfather         fuches corneal dystrophy    Pancreatic Cancer Maternal Uncle         SOCIAL HISTORY  Social History     Socioeconomic History    Marital status:      Spouse name: Joesph    Number of children: 2    Years of education: Not on file    Highest education level: Not on file   Occupational History    Not on file   Tobacco Use    Smoking status: Former     Current packs/day: 0.00     Average packs/day: 1 pack/day for 20.0 years (20.0 ttl pk-yrs)     Types: Cigarettes     Start date: 1977     Quit date: 1997     Years since quittin.8    Smokeless tobacco: Never   Vaping Use    Vaping

## 2024-11-18 ENCOUNTER — HOSPITAL ENCOUNTER (OUTPATIENT)
Dept: PHYSICAL THERAPY | Age: 64
Setting detail: THERAPIES SERIES
Discharge: HOME | End: 2024-11-18
Payer: MEDICARE

## 2024-11-18 PROCEDURE — 97161 PT EVAL LOW COMPLEX 20 MIN: CPT

## 2024-11-18 PROCEDURE — 97110 THERAPEUTIC EXERCISES: CPT

## 2024-11-18 NOTE — PROGRESS NOTES
pain  Prognosis: good    GOALS:  Patient Goal: Get rid of pain    Short Term Goals: defer to LTGs    Long Term Goals: 8 weeks  Patient will improve right ankle inversion and eversion strength to 5/5 to improve stability when walking.   Patient will perform right single limb stance x20 seconds on foam to tolerate walking on uneven terrain.  Patient will report <3/10 right foot pain first thing in the morning and tolerate standing without lean onto lateral foot.   Patient will be independent and compliant with HEP to achieve above goals.         Patient Education:   [x]  HEP/Education Completed: Plan of Care, Goals, soleus stretch, toe yoga, arch lifts with handout, Astym  [employer] Access Code: [email or web address]   []  No new Education completed  []  Reviewed Prior HEP      [x]  Patient verbalized and/or demonstrated understanding of education provided.  []  Patient unable to verbalize and/or demonstrate understanding of education provided.  Will continue education.  []  Barriers to learning:     PLAN:  Treatment Recommendations: Strengthening, Range of Motion, Balance Training, Neuromuscular Re-education, Manual Therapy - Soft Tissue Mobilization, Home Exercise Program, Patient Education, Integrative Dry Needling, and Modalities    [x]  Plan of care initiated.  Plan to see patient 2 times per week for 8 weeks to address the treatment planned outlined above.  []  Continue with current plan of care  []  Modify plan of care as follows:    []  Hold pending physician visit  []  Discharge    Time In 0947   Time Out 1035   Timed Code Minutes: 10 min   Total Treatment Time: 48 min       Electronically Signed by: Jillian Corona, PT

## 2024-11-20 ENCOUNTER — HOSPITAL ENCOUNTER (OUTPATIENT)
Dept: PHYSICAL THERAPY | Age: 64
Setting detail: THERAPIES SERIES
Discharge: HOME | End: 2024-11-20
Payer: MEDICARE

## 2024-11-20 PROCEDURE — 97110 THERAPEUTIC EXERCISES: CPT

## 2024-11-20 PROCEDURE — 97140 MANUAL THERAPY 1/> REGIONS: CPT

## 2024-11-20 NOTE — PROGRESS NOTES
[facility]  PHYSICAL THERAPY  [] EVALUATION  [x] DAILY NOTE (LAND) [] DAILY NOTE (AQUATIC ) [] PROGRESS NOTE [] DISCHARGE NOTE    [x] OUTPATIENT REHABILITATION CENTER [facility]   [] [place] [facility] [place]    [] [facility]   [] [facility]    Date: 2024  Patient Name:  Marilee Lind  : 1960  MRN: [id number]  CSN: [id number]    Referring Practitioner Jillian Christine DPM [id number]      Diagnosis  Diagnoses       M72.2 (ICD-10-CM) - Plantar fascial fibromatosis           Treatment Diagnosis M79.671  Pain in right foot  M25.371 Other instability, right ankle  R53.1 Weakness   Date of Evaluation 24   Additional Pertinent History Marilee Lind has a past medical history of Acquired autoimmune hypothyroidism, Breast cancer (HCC), Diverticulitis, History of kidney stones, History of therapeutic radiation, Intestinal metaplasia of gastric mucosa, Invasive ductal carcinoma of breast, right (HCC), and Osteopenia.  she has a past surgical history that includes Colonoscopy (2018); Upper gastrointestinal endoscopy ();  section (,); laparoscopy (,); Cystoscopy (Left, 2017); TAMIKO US GUIDED BREAST BIOPSY W LOC DEVICE 1ST LESION RIGHT (2020); US PLACE BREAST LOC DEVICE 1ST LESION RIGHT (2020); Breast lumpectomy (Right, 2020); Breast lumpectomy (Right, 2020); pr bx breast needle core w/o imaging guidance spx (Right, 2020); ORIF radius & ulna fractures (Right, 2023); and Wrist surgery (Right, 2023).     Allergies Allergies   Allergen Reactions    Acetaminophen      Gi upset    Aspartame And Phenylalanine     Bextra [Valdecoxib]      urinary retention    Doxycycline      GI    Magnesium Citrate      Internal  Pain, nausea, vomiting, diarrhea    Ciprofloxacin Palpitations    Flagyl [Metronidazole] Palpitations    Neomycin Rash      Medications   Current Outpatient Medications:     anastrozole

## 2024-11-25 ENCOUNTER — HOSPITAL ENCOUNTER (OUTPATIENT)
Dept: PHYSICAL THERAPY | Age: 64
Setting detail: THERAPIES SERIES
Discharge: HOME | End: 2024-11-25
Payer: MEDICARE

## 2024-11-25 PROCEDURE — 97110 THERAPEUTIC EXERCISES: CPT

## 2024-11-25 PROCEDURE — 97140 MANUAL THERAPY 1/> REGIONS: CPT

## 2024-11-25 NOTE — PROGRESS NOTES
[facility]  PHYSICAL THERAPY  [] EVALUATION  [x] DAILY NOTE (LAND) [] DAILY NOTE (AQUATIC ) [] PROGRESS NOTE [] DISCHARGE NOTE    [x] OUTPATIENT REHABILITATION CENTER [facility]   [] [place] [facility] [place]    [] [facility]   [] [facility]    Date: 2024  Patient Name:  Marilee Lind  : 1960  MRN: [id number]  CSN: [id number]    Referring Practitioner Jillian Christine DPM [id number]      Diagnosis  Diagnoses       M72.2 (ICD-10-CM) - Plantar fascial fibromatosis           Treatment Diagnosis M79.671  Pain in right foot  M25.371 Other instability, right ankle  R53.1 Weakness   Date of Evaluation 24   Additional Pertinent History Marilee Lind has a past medical history of Acquired autoimmune hypothyroidism, Breast cancer (HCC), Diverticulitis, History of kidney stones, History of therapeutic radiation, Intestinal metaplasia of gastric mucosa, Invasive ductal carcinoma of breast, right (HCC), and Osteopenia.  she has a past surgical history that includes Colonoscopy (2018); Upper gastrointestinal endoscopy ();  section (,); laparoscopy (,); Cystoscopy (Left, 2017); TAMIKO US GUIDED BREAST BIOPSY W LOC DEVICE 1ST LESION RIGHT (2020); US PLACE BREAST LOC DEVICE 1ST LESION RIGHT (2020); Breast lumpectomy (Right, 2020); Breast lumpectomy (Right, 2020); pr bx breast needle core w/o imaging guidance spx (Right, 2020); ORIF radius & ulna fractures (Right, 2023); and Wrist surgery (Right, 2023).     Allergies Allergies   Allergen Reactions    Acetaminophen      Gi upset    Aspartame And Phenylalanine     Bextra [Valdecoxib]      urinary retention    Doxycycline      GI    Magnesium Citrate      Internal  Pain, nausea, vomiting, diarrhea    Ciprofloxacin Palpitations    Flagyl [Metronidazole] Palpitations    Neomycin Rash      Medications   Current Outpatient Medications:     anastrozole

## 2024-12-02 ENCOUNTER — HOSPITAL ENCOUNTER (OUTPATIENT)
Dept: PHYSICAL THERAPY | Age: 64
Setting detail: THERAPIES SERIES
Discharge: HOME | End: 2024-12-02
Payer: MEDICARE

## 2024-12-02 PROCEDURE — 97140 MANUAL THERAPY 1/> REGIONS: CPT

## 2024-12-02 PROCEDURE — 97110 THERAPEUTIC EXERCISES: CPT

## 2024-12-02 PROCEDURE — 97035 APP MDLTY 1+ULTRASOUND EA 15: CPT

## 2024-12-02 NOTE — PROGRESS NOTES
strength to 5/5 to improve stability when walking.   Patient will perform right single limb stance x20 seconds on foam to tolerate walking on uneven terrain.  Patient will report <3/10 right foot pain first thing in the morning and tolerate standing without lean onto lateral foot.   Patient will be independent and compliant with HEP to achieve above goals.         Patient Education:   [x]  HEP/Education Completed: monitor response to phono,  progress reps with [facility]  [employer] Access Code: [email or web address]   []  No new Education completed  [x]  Reviewed Prior HEP      [x]  Patient verbalized and/or demonstrated understanding of education provided.  []  Patient unable to verbalize and/or demonstrate understanding of education provided.  Will continue education.  []  Barriers to learning:     PLAN:  Treatment Recommendations: Strengthening, Range of Motion, Balance Training, Neuromuscular Re-education, Manual Therapy - Soft Tissue Mobilization, Home Exercise Program, Patient Education, Integrative Dry Needling, and Modalities    []  Plan of care initiated.  Plan to see patient 2 times per week for 8 weeks to address the treatment planned outlined above.  [x]  Continue with current plan of care  []  Modify plan of care as follows:    []  Hold pending physician visit  []  Discharge    Time In 0946   Time Out 1028   Timed Code Minutes: 42 min   Total Treatment Time: 42 min       Electronically Signed by: Jillian Corona PT

## 2024-12-05 ENCOUNTER — HOSPITAL ENCOUNTER (OUTPATIENT)
Dept: PHYSICAL THERAPY | Age: 64
Setting detail: THERAPIES SERIES
Discharge: HOME | End: 2024-12-05
Payer: MEDICARE

## 2024-12-05 PROCEDURE — 97035 APP MDLTY 1+ULTRASOUND EA 15: CPT

## 2024-12-05 PROCEDURE — 97140 MANUAL THERAPY 1/> REGIONS: CPT

## 2024-12-05 PROCEDURE — 97110 THERAPEUTIC EXERCISES: CPT

## 2024-12-05 NOTE — PROGRESS NOTES
[facility]  PHYSICAL THERAPY  [] EVALUATION  [x] DAILY NOTE (LAND) [] DAILY NOTE (AQUATIC ) [] PROGRESS NOTE [] DISCHARGE NOTE    [x] OUTPATIENT REHABILITATION CENTER [facility]   [] [place] [facility] [place]    [] [facility]   [] [facility]    Date: 2024  Patient Name:  Marilee Lind  : 1960  MRN: [id number]  CSN: [id number]    Referring Practitioner Jillian Christine DPM [id number]      Diagnosis  Diagnoses       M72.2 (ICD-10-CM) - Plantar fascial fibromatosis           Treatment Diagnosis M79.671  Pain in right foot  M25.371 Other instability, right ankle  R53.1 Weakness   Date of Evaluation 24   Additional Pertinent History Marilee Lind has a past medical history of Acquired autoimmune hypothyroidism, Breast cancer (HCC), Diverticulitis, History of kidney stones, History of therapeutic radiation, Intestinal metaplasia of gastric mucosa, Invasive ductal carcinoma of breast, right (HCC), and Osteopenia.  she has a past surgical history that includes Colonoscopy (2018); Upper gastrointestinal endoscopy ();  section (,); laparoscopy (,); Cystoscopy (Left, 2017); TAMIKO US GUIDED BREAST BIOPSY W LOC DEVICE 1ST LESION RIGHT (2020); US PLACE BREAST LOC DEVICE 1ST LESION RIGHT (2020); Breast lumpectomy (Right, 2020); Breast lumpectomy (Right, 2020); pr bx breast needle core w/o imaging guidance spx (Right, 2020); ORIF radius & ulna fractures (Right, 2023); and Wrist surgery (Right, 2023).     Allergies Allergies   Allergen Reactions    Acetaminophen      Gi upset    Aspartame And Phenylalanine     Bextra [Valdecoxib]      urinary retention    Doxycycline      GI    Magnesium Citrate      Internal  Pain, nausea, vomiting, diarrhea    Ciprofloxacin Palpitations    Flagyl [Metronidazole] Palpitations    Neomycin Rash      Medications   Current Outpatient Medications:     anastrozole

## 2024-12-09 ENCOUNTER — APPOINTMENT (OUTPATIENT)
Dept: PHYSICAL THERAPY | Age: 64
End: 2024-12-09
Payer: MEDICARE

## 2024-12-10 ENCOUNTER — HOSPITAL ENCOUNTER (OUTPATIENT)
Dept: PHYSICAL THERAPY | Age: 64
Setting detail: THERAPIES SERIES
Discharge: HOME | End: 2024-12-10
Payer: MEDICARE

## 2024-12-10 PROCEDURE — 97110 THERAPEUTIC EXERCISES: CPT

## 2024-12-10 PROCEDURE — 97140 MANUAL THERAPY 1/> REGIONS: CPT

## 2024-12-10 NOTE — PROGRESS NOTES
Term Goals: 8 weeks  Patient will improve right ankle inversion and eversion strength to 5/5 to improve stability when walking.   Patient will perform right single limb stance x20 seconds on foam to tolerate walking on uneven terrain.  Patient will report <3/10 right foot pain first thing in the morning and tolerate standing without lean onto lateral foot.   Patient will be independent and compliant with HEP to achieve above goals.         Patient Education:   []  HEP/Education Completed:ice and hydrate after Astym  [employer] Access Code: [email or web address]   []  No new Education completed  [x]  Reviewed Prior HEP      [x]  Patient verbalized and/or demonstrated understanding of education provided.  []  Patient unable to verbalize and/or demonstrate understanding of education provided.  Will continue education.  []  Barriers to learning:     PLAN:  Treatment Recommendations: Strengthening, Range of Motion, Balance Training, Neuromuscular Re-education, Manual Therapy - Soft Tissue Mobilization, Home Exercise Program, Patient Education, Integrative Dry Needling, and Modalities    []  Plan of care initiated.  Plan to see patient 2 times per week for 8 weeks to address the treatment planned outlined above.  [x]  Continue with current plan of care  []  Modify plan of care as follows:    []  Hold pending physician visit  []  Discharge    Time In 1007   Time Out 1050   Timed Code Minutes: 43 min   Total Treatment Time: 43 min       Electronically Signed by: Jillian Corona PT

## 2024-12-12 ENCOUNTER — HOSPITAL ENCOUNTER (OUTPATIENT)
Dept: PHYSICAL THERAPY | Age: 64
Setting detail: THERAPIES SERIES
Discharge: HOME | End: 2024-12-12
Payer: MEDICARE

## 2024-12-12 PROCEDURE — 97140 MANUAL THERAPY 1/> REGIONS: CPT

## 2024-12-12 PROCEDURE — 97110 THERAPEUTIC EXERCISES: CPT

## 2024-12-12 PROCEDURE — 97035 APP MDLTY 1+ULTRASOUND EA 15: CPT

## 2024-12-12 NOTE — PROGRESS NOTES
[facility]  PHYSICAL THERAPY  [] EVALUATION  [x] DAILY NOTE (LAND) [] DAILY NOTE (AQUATIC ) [] PROGRESS NOTE [] DISCHARGE NOTE    [x] OUTPATIENT REHABILITATION CENTER [facility]   [] [facility]    [] [facility]   [] [facility]    Date: 2024  Patient Name:  Marilee Lnid  : 1960  MRN: [id number]  CSN: [id number]    Referring Practitioner Jillian Christine DPM [id number]      Diagnosis  Diagnoses       M72.2 (ICD-10-CM) - Plantar fascial fibromatosis           Treatment Diagnosis M79.671  Pain in right foot  M25.371 Other instability, right ankle  R53.1 Weakness   Date of Evaluation 24   Additional Pertinent History Marilee Lind has a past medical history of Acquired autoimmune hypothyroidism, Breast cancer (HCC), Diverticulitis, History of kidney stones, History of therapeutic radiation, Intestinal metaplasia of gastric mucosa, Invasive ductal carcinoma of breast, right (HCC), and Osteopenia.  she has a past surgical history that includes Colonoscopy (2018); Upper gastrointestinal endoscopy ();  section (,); laparoscopy (,); Cystoscopy (Left, 2017); TAMIKO US GUIDED BREAST BIOPSY W LOC DEVICE 1ST LESION RIGHT (2020); US PLACE BREAST LOC DEVICE 1ST LESION RIGHT (2020); Breast lumpectomy (Right, 2020); Breast lumpectomy (Right, 2020); pr bx breast needle core w/o imaging guidance spx (Right, 2020); ORIF radius & ulna fractures (Right, 2023); and Wrist surgery (Right, 2023).     Allergies Allergies   Allergen Reactions    Acetaminophen      Gi upset    Aspartame And Phenylalanine     Bextra [Valdecoxib]      urinary retention    Doxycycline      GI    Magnesium Citrate      Internal  Pain, nausea, vomiting, diarrhea    Ciprofloxacin Palpitations    Flagyl [Metronidazole] Palpitations    Neomycin Rash      Medications   Current Outpatient Medications:     anastrozole

## 2024-12-16 ENCOUNTER — HOSPITAL ENCOUNTER (OUTPATIENT)
Dept: RADIATION ONCOLOGY | Age: 64
Discharge: HOME | End: 2024-12-16
Attending: RADIOLOGY
Payer: MEDICARE

## 2024-12-16 ENCOUNTER — APPOINTMENT (OUTPATIENT)
Dept: PHYSICAL THERAPY | Age: 64
End: 2024-12-16
Payer: MEDICARE

## 2024-12-16 VITALS
OXYGEN SATURATION: 100 % | HEART RATE: 78 BPM | DIASTOLIC BLOOD PRESSURE: 77 MMHG | RESPIRATION RATE: 16 BRPM | SYSTOLIC BLOOD PRESSURE: 116 MMHG | BODY MASS INDEX: 25.71 KG/M2 | WEIGHT: 136 LBS | TEMPERATURE: 98 F

## 2024-12-16 PROCEDURE — 99212 OFFICE O/P EST SF 10 MIN: CPT | Performed by: RADIOLOGY

## 2024-12-16 ASSESSMENT — ENCOUNTER SYMPTOMS
ABDOMINAL PAIN: 0
COUGH: 0
BLOOD IN STOOL: 0
NAUSEA: 0
BACK PAIN: 0
DIARRHEA: 0
RECTAL PAIN: 0
TROUBLE SWALLOWING: 0
SHORTNESS OF BREATH: 0

## 2024-12-16 NOTE — PROGRESS NOTES
[facility]  [address] [place] [address]  Phone: [phone]   Toll Free: [phone]   Fax: [phone]    RADIATION ONCOLOGY FOLLOW UP REPORT    PATIENT NAME:  Marilee Lind              : 1960  MEDICAL RECORD NO: [id number]    LOCATION: Radiation Oncology  CSN NO: [id number]      PROVIDER: Magalys Robledo PhD, MD    DATE OF SERVICE: 2024      FOLLOW UP PHYSICIANS: Dr. Medina; WANDA Smith, APRN - CNP       DIAGNOSIS: C50.611 -- Malignant neoplasm of the axillary tail portion of the right female breast; Infiltrating ductal carcinoma, Grade 1 (with extenbsive intraductal component, DCIS intermediate grade); pT1b pN0(sn) M0, Stage IA; ER+; TN+; Her2-; Oncotype Dx Score 16.  Date of Diagnosis: 2020      ASSESSMENT:  ***      PLAN:  ***      RADIATION THERAPY TREATMENT HISTORY: Treatment Course Number: 1     Treatment Site (s) Modality Dose (cGy) From To Fractions/  Elapsed Days   Right Breast Mixed 6MV/15MV photons 4256 cGy 10/12/2020 2020 16/ 21   Right Breast Tumor Bed Boost 9Me-V electrons 1000 cGy 2020      Cumulative Dose to Right Breast Tumor Bed: 5256 cGy         CHAPERONE: ***      HISTORY OF PRESENT ILLNESS:   Marilee Lind presented as a 60-year-old woman who developed pain in the right breast.  She underwent diagnostic mammogram, showing a 9 mm density in the axillary tail of the right breast.  Ultrasound showed a spiculated nodule corresponding to the mammographic abnormality, and was considered highly suggestive of malignancy.  Subsequent biopsy 2020 showed invasive ductal carcinoma, ER positive, ER positive.  HER-2/deysi was equivocal on the initial evaluation, but subsequent additional evaluation indicated that HER-2/deysi is non-amplified.  Marilee was seen for surgical consultation, and had a lengthy discussion about current clinical practice guidelines, treatment options and  227.9

## 2024-12-19 ENCOUNTER — HOSPITAL ENCOUNTER (OUTPATIENT)
Dept: PHYSICAL THERAPY | Age: 64
Setting detail: THERAPIES SERIES
Discharge: HOME | End: 2024-12-19
Payer: MEDICARE

## 2024-12-19 PROCEDURE — 97035 APP MDLTY 1+ULTRASOUND EA 15: CPT

## 2024-12-19 PROCEDURE — 97110 THERAPEUTIC EXERCISES: CPT

## 2024-12-19 NOTE — PROGRESS NOTES
bad she as anticipated it to be.     OBJECTIVE:  Dry Needling Safety Questions Response   Are you currently receiving any form of cancer treatment? Yes: Comment: 5 year medication after breast cancer   Do you have any form of a bleeding disorder? No   Have you ever fainted or experienced a seizure? No   Do you have a pacemaker or any other electrical implant?  No   Are you currently taking any anticoagulants? No   Are you currently taking antibiotics for an infection? No   Do you have a damaged heart valve, metal prosthesis, or other risk of infection? No   Are you pregnant or actively trying for a pregnancy? No   Do you have breast implants? No   Do you suffer from metal allergies? Yes: Comment: Isa   Are you diabetic or do you suffer from impaired wound healing? No   Do you have hepatitis B, hepatitis C, HIV, or any other infectious disease? No   Have you eaten in the last two hours? Yes: Comment:         TREATMENT   Precautions:    Pain: right foot pain 4/10 first thing this morning    \"X” in shaded column indicates activity completed today    “*\" next to exercise/intervention indicates progression   Modalities Parameters/  Location  Notes   Phonophoresis with 1% hydrocortisone to right medial heel and lateral foot/heel 10 min (5 min each spot), 3MHz, 50% 1.0w/cm2 x    IDN:  Sural R  Lateral popliteal R  Tibial R  Deep fibular R  Plantar fascia attachment at medial heel R 15 min x Nickel allergy         Manual Therapy Time/Technique  Notes   Astym to Right Lower leg- 50% pressure 15 min  Soft tissue restrictions noted at dorsum of foot, posterior tibial tendon, ankle mortise, gastroc, soleus, achilles, plantar fascia               Exercise/Intervention   Notes   Matrix Bike: seat 5 6 min Level 3 X           Ankle towel stretches with towel: gastroc, soleus, inversion, eversion 3x20 sec each      Soleus and gastroc stretch at wall 2x20 sec each  x Slight foot eversion for comfort   Great toe extension 15x

## 2024-12-23 ENCOUNTER — HOSPITAL ENCOUNTER (OUTPATIENT)
Dept: PHYSICAL THERAPY | Age: 64
Setting detail: THERAPIES SERIES
Discharge: HOME | End: 2024-12-23
Payer: MEDICARE

## 2024-12-23 PROCEDURE — 97035 APP MDLTY 1+ULTRASOUND EA 15: CPT

## 2024-12-23 PROCEDURE — 97110 THERAPEUTIC EXERCISES: CPT

## 2024-12-23 NOTE — PROGRESS NOTES
[facility]  PHYSICAL THERAPY  [] EVALUATION  [] DAILY NOTE (LAND) [] DAILY NOTE (AQUATIC ) [x] PROGRESS NOTE [] DISCHARGE NOTE    [x] OUTPATIENT REHABILITATION CENTER [facility]   [] [place] [facility] [place]    [] [facility]   [] [facility]    Date: 2024  Patient Name:  Marilee Lind  : 1960  MRN: [id number]  CSN: [id number]    Referring Practitioner Jillian Christine DPM [id number]      Diagnosis  Diagnoses       M72.2 (ICD-10-CM) - Plantar fascial fibromatosis           Treatment Diagnosis M79.671  Pain in right foot  M25.371 Other instability, right ankle  R53.1 Weakness   Date of Evaluation 24   Additional Pertinent History Marilee Lind has a past medical history of Acquired autoimmune hypothyroidism, Breast cancer (HCC), Diverticulitis, History of kidney stones, History of therapeutic radiation, Intestinal metaplasia of gastric mucosa, Invasive ductal carcinoma of breast, right (HCC), and Osteopenia.  she has a past surgical history that includes Colonoscopy (2018); Upper gastrointestinal endoscopy ();  section (,); laparoscopy (,); Cystoscopy (Left, 2017); TAMIKO US GUIDED BREAST BIOPSY W LOC DEVICE 1ST LESION RIGHT (2020); US PLACE BREAST LOC DEVICE 1ST LESION RIGHT (2020); Breast lumpectomy (Right, 2020); Breast lumpectomy (Right, 2020); pr bx breast needle core w/o imaging guidance spx (Right, 2020); ORIF radius & ulna fractures (Right, 2023); and Wrist surgery (Right, 2023).     Allergies Allergies   Allergen Reactions    Acetaminophen      Gi upset    Aspartame And Phenylalanine     Bextra [Valdecoxib]      urinary retention    Doxycycline      GI    Magnesium Citrate      Internal  Pain, nausea, vomiting, diarrhea    Ciprofloxacin Palpitations    Flagyl [Metronidazole] Palpitations    Neomycin Rash      Medications   Current Outpatient Medications:     anastrozole

## 2025-01-03 ENCOUNTER — OFFICE VISIT (OUTPATIENT)
Dept: FAMILY MEDICINE CLINIC | Age: 65
End: 2025-01-03

## 2025-01-03 VITALS
TEMPERATURE: 97.8 F | OXYGEN SATURATION: 97 % | HEIGHT: 61 IN | HEART RATE: 97 BPM | WEIGHT: 134.6 LBS | RESPIRATION RATE: 16 BRPM | DIASTOLIC BLOOD PRESSURE: 72 MMHG | SYSTOLIC BLOOD PRESSURE: 122 MMHG | BODY MASS INDEX: 25.41 KG/M2

## 2025-01-03 DIAGNOSIS — H65.191 ACUTE EFFUSION OF RIGHT EAR: ICD-10-CM

## 2025-01-03 DIAGNOSIS — J01.90 ACUTE RHINOSINUSITIS: Primary | ICD-10-CM

## 2025-01-03 DIAGNOSIS — R09.81 NASAL CONGESTION: ICD-10-CM

## 2025-01-03 RX ORDER — PREDNISONE 20 MG/1
20 TABLET ORAL 2 TIMES DAILY
Qty: 6 TABLET | Refills: 0 | Status: SHIPPED | OUTPATIENT
Start: 2025-01-03 | End: 2025-01-06

## 2025-01-03 RX ORDER — CEFDINIR 300 MG/1
300 CAPSULE ORAL 2 TIMES DAILY
Qty: 20 CAPSULE | Refills: 0 | Status: SHIPPED | OUTPATIENT
Start: 2025-01-03 | End: 2025-01-13

## 2025-01-03 ASSESSMENT — PATIENT HEALTH QUESTIONNAIRE - PHQ9
SUM OF ALL RESPONSES TO PHQ QUESTIONS 1-9: 0
2. FEELING DOWN, DEPRESSED OR HOPELESS: NOT AT ALL
SUM OF ALL RESPONSES TO PHQ QUESTIONS 1-9: 0
SUM OF ALL RESPONSES TO PHQ9 QUESTIONS 1 & 2: 0
SUM OF ALL RESPONSES TO PHQ QUESTIONS 1-9: 0
SUM OF ALL RESPONSES TO PHQ QUESTIONS 1-9: 0
1. LITTLE INTEREST OR PLEASURE IN DOING THINGS: NOT AT ALL

## 2025-01-03 NOTE — PROGRESS NOTES
and nasal mucosa congested.   CVS exam: normal rate, regular rhythm, normal S1, S2, no murmurs, rubs, clicks or gallops.  Chest:clear to auscultation, no wheezes, rales or rhonchi, symmetric air entry, no tachypnea, retractions or cyanosis.   Abdominal exam: soft, nontender, nondistended, no masses or organomegaly.  Extremities:  No clubbing, cyanosis or edema  Skin exam - normal coloration and turgor, no rashes, no suspicious skin lesions noted.  Psych -  Affect appropriate.  Thought process is normal without evidence of depression or psychosis.    Good insight and appropriae interaction.  Cognition and memory appear to be intact.        ASSESSMENT & PLAN  Marilee was seen today for congestion.    Diagnoses and all orders for this visit:    Acute rhinosinusitis  -     cefdinir (OMNICEF) 300 MG capsule; Take 1 capsule by mouth 2 times daily for 10 days  Hydrate well   Extra rest  Tylenol or motrin for fever or pain   Nasal congestion  -     predniSONE (DELTASONE) 20 MG tablet; Take 1 tablet by mouth 2 times daily for 3 days  Saline nasal spray  Acute effusion of right ear  Prednisone  Call for worsening symptoms       Return if symptoms worsen or fail to improve.     -Patient advised to call immediately or go to ER if any worsening of symptoms  -Patient counseled on conservative care including fluids, rest and OTC meds    Marilee received counseling on the following healthy behaviors: medication adherence  Reviewed prior labs and health maintenance.  Continue current medications, diet and exercise.  Discussed use, benefit, and side effects of prescribed medications. Barriers to medication compliance addressed.   Patient given educational materials - see patient instructions.    All patient questions answered.  Patient voiced understanding.       I have reviewed this patient's history, habits, and medication list and have updated the chart where appropriate.

## 2025-01-24 ENCOUNTER — HOSPITAL ENCOUNTER (OUTPATIENT)
Dept: WOMENS IMAGING | Age: 65
Discharge: HOME | End: 2025-01-24
Attending: INTERNAL MEDICINE
Payer: MEDICARE

## 2025-01-24 VITALS — BODY MASS INDEX: 24.88 KG/M2 | HEIGHT: 61 IN | WEIGHT: 131.8 LBS

## 2025-01-24 DIAGNOSIS — R93.89 ABNORMAL FINDINGS ON DIAGNOSTIC IMAGING OF OTHER SPECIFIED BODY STRUCTURES: ICD-10-CM

## 2025-01-24 DIAGNOSIS — Z12.31 ENCOUNTER FOR SCREENING MAMMOGRAM FOR MALIGNANT NEOPLASM OF BREAST: ICD-10-CM

## 2025-01-24 DIAGNOSIS — Z79.811 AROMATASE INHIBITOR USE: ICD-10-CM

## 2025-01-24 PROCEDURE — 77080 DXA BONE DENSITY AXIAL: CPT

## 2025-01-24 PROCEDURE — 77063 BREAST TOMOSYNTHESIS BI: CPT

## 2025-02-06 ENCOUNTER — TELEPHONE (OUTPATIENT)
Dept: ONCOLOGY | Age: 65
End: 2025-02-06

## 2025-02-06 NOTE — TELEPHONE ENCOUNTER
Labs reviewed. To be discussed at appointment on 2/10/25. Was there a specific question to address prior to appointment?

## 2025-02-06 NOTE — TELEPHONE ENCOUNTER
Can you please take a peek at the labs I have scanned into media, pt will see Dr Medina on 02/10/2025.    Thank you!

## 2025-02-10 ENCOUNTER — HOSPITAL ENCOUNTER (OUTPATIENT)
Dept: INFUSION THERAPY | Age: 65
Discharge: HOME | End: 2025-02-10
Payer: MEDICARE

## 2025-02-10 ENCOUNTER — OFFICE VISIT (OUTPATIENT)
Dept: ONCOLOGY | Age: 65
End: 2025-02-10
Payer: MEDICARE

## 2025-02-10 VITALS
HEIGHT: 61 IN | WEIGHT: 133 LBS | HEART RATE: 81 BPM | RESPIRATION RATE: 16 BRPM | DIASTOLIC BLOOD PRESSURE: 67 MMHG | SYSTOLIC BLOOD PRESSURE: 153 MMHG | OXYGEN SATURATION: 99 % | BODY MASS INDEX: 25.11 KG/M2

## 2025-02-10 VITALS
HEART RATE: 81 BPM | RESPIRATION RATE: 16 BRPM | SYSTOLIC BLOOD PRESSURE: 153 MMHG | DIASTOLIC BLOOD PRESSURE: 67 MMHG | OXYGEN SATURATION: 99 %

## 2025-02-10 DIAGNOSIS — Z12.31 ENCOUNTER FOR SCREENING MAMMOGRAM FOR MALIGNANT NEOPLASM OF BREAST: ICD-10-CM

## 2025-02-10 DIAGNOSIS — C50.411 CARCINOMA OF UPPER-OUTER QUADRANT OF RIGHT BREAST IN FEMALE, ESTROGEN RECEPTOR NEGATIVE (HCC): ICD-10-CM

## 2025-02-10 DIAGNOSIS — Z17.1 CARCINOMA OF UPPER-OUTER QUADRANT OF RIGHT BREAST IN FEMALE, ESTROGEN RECEPTOR NEGATIVE (HCC): ICD-10-CM

## 2025-02-10 DIAGNOSIS — Z79.811 AROMATASE INHIBITOR USE: Primary | ICD-10-CM

## 2025-02-10 DIAGNOSIS — Z79.811 AROMATASE INHIBITOR USE: ICD-10-CM

## 2025-02-10 PROCEDURE — G8419 CALC BMI OUT NRM PARAM NOF/U: HCPCS | Performed by: INTERNAL MEDICINE

## 2025-02-10 PROCEDURE — 1126F AMNT PAIN NOTED NONE PRSNT: CPT | Performed by: INTERNAL MEDICINE

## 2025-02-10 PROCEDURE — 99214 OFFICE O/P EST MOD 30 MIN: CPT | Performed by: INTERNAL MEDICINE

## 2025-02-10 PROCEDURE — G8427 DOCREV CUR MEDS BY ELIG CLIN: HCPCS | Performed by: INTERNAL MEDICINE

## 2025-02-10 PROCEDURE — 99211 OFF/OP EST MAY X REQ PHY/QHP: CPT

## 2025-02-10 PROCEDURE — 1090F PRES/ABSN URINE INCON ASSESS: CPT | Performed by: INTERNAL MEDICINE

## 2025-02-10 PROCEDURE — G8399 PT W/DXA RESULTS DOCUMENT: HCPCS | Performed by: INTERNAL MEDICINE

## 2025-02-10 PROCEDURE — 3017F COLORECTAL CA SCREEN DOC REV: CPT | Performed by: INTERNAL MEDICINE

## 2025-02-10 PROCEDURE — 1036F TOBACCO NON-USER: CPT | Performed by: INTERNAL MEDICINE

## 2025-02-10 PROCEDURE — 1160F RVW MEDS BY RX/DR IN RCRD: CPT | Performed by: INTERNAL MEDICINE

## 2025-02-10 PROCEDURE — 1123F ACP DISCUSS/DSCN MKR DOCD: CPT | Performed by: INTERNAL MEDICINE

## 2025-02-10 PROCEDURE — 1159F MED LIST DOCD IN RCRD: CPT | Performed by: INTERNAL MEDICINE

## 2025-02-10 NOTE — PATIENT INSTRUCTIONS
Orders Placed This Encounter   Procedures    MRI BREAST BILATERAL W WO CONTRAST    CBC with Auto Differential    Hepatic Function Panel    POC PANEL BMP W/IOCA   Return in about 27 weeks (around 8/18/2025). To review MRI+ labs  and recommendations to continue anastraole.

## 2025-02-12 NOTE — PROGRESS NOTES
[facility] [place] [facility]  [facility]  [address]  [address]  [place] [id number]  Dept: [phone]  Loc: [phone]     Marilee Lind  1960   No ref. provider found   Shade Smith APRN - CNP       Marilee Lind is a 64 y.o.  female breast cancer    CHIEF COMPLAINT  Chief Complaint   Patient presents with    Follow-up     Carcinoma of upper-outer quadrant of right breast in female, estrogen receptor negative           HISTORY OF PRESENT ILLNESS    7/31/2020.  Annual screening mammogram showed 0.9 cm mass in right breast.    8/4/2020.  Core biopsy showed low-grade invasive ductal carcinoma the right breast.  Tumor cells were estrogen receptor positive, progesterone receptor positive and HER2 was 2+ by IHC but FISH was negative for amplification.    8/18/2020.  Lumpectomy and sentinel lymph node biopsy by Dr. Millan revealed pathology with invasive ductal adenocarcinoma, grade 1.  The anterior margin which was skin was focally positive for invasive carcinoma.  DCIS was 1 mm from the skin margin.  Staging showed pT1b, pN0 (SN).  [place] lymph node biopsy was negative for malignancy.     8/27/2020.  Reexcision showed no evidence of residual malignancy.    Family history is positive for multiple family members with pancreatic cancer.  Previously she declined genetic counseling but said that she would think about it and discuss it with her family members before making a final decision.     8/4/2020 through 11/6/2020.  Radiation therapy to the breast receiving 5256 cGy in 20 fractions.    November, 2020.  Started adjuvant hormonal therapy with aromatase inhibitor.    1/25/2022.  Patient is tolerating her hormonal treatment well with no significant arthralgia and minimal hot flashes.    July 2021.  Annual screening mammogram showed benign findings.  Current mammogram is being ordered     Mrs. Lind was moving some boxes and probably injured her right

## 2025-05-12 SDOH — ECONOMIC STABILITY: FOOD INSECURITY: WITHIN THE PAST 12 MONTHS, THE FOOD YOU BOUGHT JUST DIDN'T LAST AND YOU DIDN'T HAVE MONEY TO GET MORE.: NEVER TRUE

## 2025-05-12 SDOH — HEALTH STABILITY: PHYSICAL HEALTH: ON AVERAGE, HOW MANY MINUTES DO YOU ENGAGE IN EXERCISE AT THIS LEVEL?: 20 MIN

## 2025-05-12 SDOH — ECONOMIC STABILITY: INCOME INSECURITY: IN THE LAST 12 MONTHS, WAS THERE A TIME WHEN YOU WERE NOT ABLE TO PAY THE MORTGAGE OR RENT ON TIME?: NO

## 2025-05-12 SDOH — ECONOMIC STABILITY: TRANSPORTATION INSECURITY
IN THE PAST 12 MONTHS, HAS THE LACK OF TRANSPORTATION KEPT YOU FROM MEDICAL APPOINTMENTS OR FROM GETTING MEDICATIONS?: NO

## 2025-05-12 SDOH — ECONOMIC STABILITY: FOOD INSECURITY: WITHIN THE PAST 12 MONTHS, YOU WORRIED THAT YOUR FOOD WOULD RUN OUT BEFORE YOU GOT MONEY TO BUY MORE.: NEVER TRUE

## 2025-05-12 SDOH — HEALTH STABILITY: PHYSICAL HEALTH: ON AVERAGE, HOW MANY DAYS PER WEEK DO YOU ENGAGE IN MODERATE TO STRENUOUS EXERCISE (LIKE A BRISK WALK)?: 5 DAYS

## 2025-05-12 SDOH — ECONOMIC STABILITY: TRANSPORTATION INSECURITY
IN THE PAST 12 MONTHS, HAS LACK OF TRANSPORTATION KEPT YOU FROM MEETINGS, WORK, OR FROM GETTING THINGS NEEDED FOR DAILY LIVING?: NO

## 2025-05-12 ASSESSMENT — PATIENT HEALTH QUESTIONNAIRE - PHQ9
2. FEELING DOWN, DEPRESSED OR HOPELESS: NOT AT ALL
SUM OF ALL RESPONSES TO PHQ QUESTIONS 1-9: 0
SUM OF ALL RESPONSES TO PHQ QUESTIONS 1-9: 0
1. LITTLE INTEREST OR PLEASURE IN DOING THINGS: NOT AT ALL
SUM OF ALL RESPONSES TO PHQ QUESTIONS 1-9: 0
SUM OF ALL RESPONSES TO PHQ QUESTIONS 1-9: 0

## 2025-05-12 ASSESSMENT — LIFESTYLE VARIABLES
HOW MANY STANDARD DRINKS CONTAINING ALCOHOL DO YOU HAVE ON A TYPICAL DAY: 0
HOW OFTEN DO YOU HAVE A DRINK CONTAINING ALCOHOL: NEVER
HOW OFTEN DO YOU HAVE A DRINK CONTAINING ALCOHOL: 1
HOW MANY STANDARD DRINKS CONTAINING ALCOHOL DO YOU HAVE ON A TYPICAL DAY: PATIENT DOES NOT DRINK
HOW OFTEN DO YOU HAVE SIX OR MORE DRINKS ON ONE OCCASION: 1

## 2025-05-15 ENCOUNTER — OFFICE VISIT (OUTPATIENT)
Dept: FAMILY MEDICINE CLINIC | Age: 65
End: 2025-05-15

## 2025-05-15 VITALS
TEMPERATURE: 97.6 F | OXYGEN SATURATION: 98 % | HEART RATE: 79 BPM | RESPIRATION RATE: 14 BRPM | BODY MASS INDEX: 25.75 KG/M2 | WEIGHT: 136.4 LBS | DIASTOLIC BLOOD PRESSURE: 78 MMHG | SYSTOLIC BLOOD PRESSURE: 128 MMHG | HEIGHT: 61 IN

## 2025-05-15 DIAGNOSIS — Z00.00 MEDICARE ANNUAL WELLNESS VISIT, SUBSEQUENT: Primary | ICD-10-CM

## 2025-05-15 DIAGNOSIS — E03.4 HYPOTHYROIDISM DUE TO ACQUIRED ATROPHY OF THYROID: ICD-10-CM

## 2025-05-15 DIAGNOSIS — Z13.220 SCREENING FOR HYPERLIPIDEMIA: ICD-10-CM

## 2025-05-15 DIAGNOSIS — M81.0 AGE-RELATED OSTEOPOROSIS WITHOUT CURRENT PATHOLOGICAL FRACTURE: ICD-10-CM

## 2025-05-15 DIAGNOSIS — Z85.3 HISTORY OF RIGHT BREAST CANCER: ICD-10-CM

## 2025-05-15 DIAGNOSIS — E06.3 HASHIMOTO'S DISEASE: ICD-10-CM

## 2025-05-15 PROBLEM — C50.411 CARCINOMA OF UPPER-OUTER QUADRANT OF RIGHT BREAST IN FEMALE, ESTROGEN RECEPTOR POSITIVE (HCC): Status: RESOLVED | Noted: 2024-11-11 | Resolved: 2025-05-15

## 2025-05-15 PROBLEM — Z17.0 CARCINOMA OF UPPER-OUTER QUADRANT OF RIGHT BREAST IN FEMALE, ESTROGEN RECEPTOR POSITIVE (HCC): Status: RESOLVED | Noted: 2024-11-11 | Resolved: 2025-05-15

## 2025-05-15 NOTE — PROGRESS NOTES
Medicare Annual Wellness Visit    Marilee Lind is here for Medicare AWV (Wants to discuss thyroid medication) and Health Maintenance (Not interested in a pneumonia shot)    Assessment & Plan   Medicare annual wellness visit, subsequent  Hypothyroidism due to acquired atrophy of thyroid  Continue with armour thyroid 15 mg alternating 30 mg very other day  Going to change over to generic form   -     TSH reflex to FT4; Future  -     Magnesium; Future  -     T3, Free; Future  Hashimoto's disease  -     TSH reflex to FT4; Future  -     Magnesium; Future  -     T3, Free; Future  History of right breast cancer  Continue with Oncology has breat MRI this fall  Still on arimidex for 6 more months   Age-related osteoporosis without current pathological fracture  -     Vitamin D 25 Hydroxy; Future  Discussed options including prolia, reclast, evenity, fosamax pt will think about them   Screening for hyperlipidemia  -     Lipid Panel; Future       Return in about 1 year (around 5/15/2026) for MAW.     Subjective   The following acute and/or chronic problems were also addressed today:    PMH: Hashimoto, hypothyroid, osteopenia, Hx breat cancer     Hashimoto  -taking armour thyroid 15 mg, once a day and 30 mg every other day  -states her skin feels dry at times and jittery at times.   -   Pt states she feels better at lower dosages  Tried to increase to 30 mg daily but pt did not tolerate.        Reviewed bone density test  States she tried the vitamin D3 1000 units and had a reaction  Will control with vitamin D rich diet   And regular physical activity     Hx breast cancer 2020  -follows with oncology   -on arimidex     Osteoporosis  -managed by Oncology   -pt has declined infusions  -taking daily calcium with supplements and foods     Reviewed labs      Last DEXA 1/2025  Last colonoscopy 2018 10 year recall  Last mammogram 1.2025    Vitamin D 39 5/2024  Lab Results   Component Value Date    WBC 4.7 05/01/2024    HGB 13.3

## 2025-05-30 LAB
CHOLESTEROL, TOTAL: 202 MG/DL (ref 100–199)
CHOLESTEROL/HDL RATIO: 2.4 (ref 2–4.5)
HDLC SERPL-MCNC: 83 MG/DL
LDL CHOLESTEROL: 108 MG/DL
LDL/HDL RATIO: 1.3
MAGNESIUM: 2.4 MG/DL (ref 1.6–2.6)
T3 FREE: 3.32 PG/ML (ref 2–4.4)
TRIGL SERPL-MCNC: 56 MG/DL (ref 20–149)
TSH SERPL DL<=0.05 MIU/L-ACNC: 1.8 UIU/ML (ref 0.27–4.2)
VITAMIN D 25-HYDROXY: 43.2 NG/ML (ref 30–100)
VLDLC SERPL CALC-MCNC: 11 MG/DL

## 2025-06-02 DIAGNOSIS — E03.9 HYPOTHYROIDISM, UNSPECIFIED TYPE: Primary | ICD-10-CM

## 2025-06-02 RX ORDER — THYROID 15 MG/1
TABLET ORAL
Qty: 135 TABLET | Refills: 4 | Status: SHIPPED | OUTPATIENT
Start: 2025-06-02

## 2025-07-10 DIAGNOSIS — Z17.1 CARCINOMA OF UPPER-OUTER QUADRANT OF RIGHT BREAST IN FEMALE, ESTROGEN RECEPTOR NEGATIVE (HCC): Primary | ICD-10-CM

## 2025-07-10 DIAGNOSIS — C50.411 CARCINOMA OF UPPER-OUTER QUADRANT OF RIGHT BREAST IN FEMALE, ESTROGEN RECEPTOR NEGATIVE (HCC): Primary | ICD-10-CM

## 2025-07-16 ENCOUNTER — TELEPHONE (OUTPATIENT)
Dept: ONCOLOGY | Age: 65
End: 2025-07-16

## 2025-07-16 DIAGNOSIS — R92.30 INCONCLUSIVE MAMMOGRAPHY DUE TO DENSE BREASTS: Primary | ICD-10-CM

## 2025-07-16 DIAGNOSIS — R92.2 INCONCLUSIVE MAMMOGRAPHY DUE TO DENSE BREASTS: Primary | ICD-10-CM

## 2025-07-16 NOTE — TELEPHONE ENCOUNTER
[facility] needs a different order for this patient. They need to be bilateral whole breast screening. Code r92.2 or r92.3. It also can not say anything about dx.Can you please put in a new order?

## 2025-07-17 ENCOUNTER — HOSPITAL ENCOUNTER (OUTPATIENT)
Dept: WOMENS IMAGING | Age: 65
Discharge: HOME | End: 2025-07-17
Attending: INTERNAL MEDICINE
Payer: MEDICARE

## 2025-07-17 DIAGNOSIS — R92.30 INCONCLUSIVE MAMMOGRAPHY DUE TO DENSE BREASTS: ICD-10-CM

## 2025-07-17 DIAGNOSIS — R92.2 INCONCLUSIVE MAMMOGRAPHY DUE TO DENSE BREASTS: ICD-10-CM

## 2025-07-17 PROCEDURE — 76641 ULTRASOUND BREAST COMPLETE: CPT

## 2025-08-14 ENCOUNTER — OFFICE VISIT (OUTPATIENT)
Dept: FAMILY MEDICINE CLINIC | Age: 65
End: 2025-08-14

## 2025-08-14 VITALS
SYSTOLIC BLOOD PRESSURE: 126 MMHG | DIASTOLIC BLOOD PRESSURE: 78 MMHG | WEIGHT: 136 LBS | HEIGHT: 61 IN | TEMPERATURE: 97.8 F | RESPIRATION RATE: 14 BRPM | HEART RATE: 83 BPM | BODY MASS INDEX: 25.68 KG/M2 | OXYGEN SATURATION: 97 %

## 2025-08-14 DIAGNOSIS — R22.0 SWOLLEN LIP: ICD-10-CM

## 2025-08-14 DIAGNOSIS — L01.00 IMPETIGO: Primary | ICD-10-CM

## 2025-08-14 DIAGNOSIS — R21 RASH: ICD-10-CM

## 2025-08-14 RX ORDER — MUPIROCIN 2 %
OINTMENT (GRAM) TOPICAL
Qty: 30 G | Refills: 0 | Status: SHIPPED | OUTPATIENT
Start: 2025-08-14 | End: 2025-08-21

## 2025-08-14 RX ORDER — SULFAMETHOXAZOLE AND TRIMETHOPRIM 800; 160 MG/1; MG/1
1 TABLET ORAL 2 TIMES DAILY
Qty: 10 TABLET | Refills: 0 | Status: SHIPPED | OUTPATIENT
Start: 2025-08-14 | End: 2025-08-19

## (undated) DEVICE — COVER ARMBRD W13XL28.5IN IMPERV BLU FOR OP RM

## (undated) DEVICE — SPECIMEN ORIENTATION CHARMS, SIX DISTINCTLY SHAPED STERILE 10MM CHARMS: Brand: MARGINMAP

## (undated) DEVICE — 4-PORT MANIFOLD: Brand: NEPTUNE 2

## (undated) DEVICE — BREAST HERNIA PACK: Brand: MEDLINE INDUSTRIES, INC.

## (undated) DEVICE — SYRINGE MED 50ML LUERLOCK TIP

## (undated) DEVICE — APPLICATOR PREP 26ML 0.7% IOD POVACRYLEX 74% ISO ALC ST

## (undated) DEVICE — ADHESIVE SKIN CLSR 0.7ML TOP DERMBND ADV

## (undated) DEVICE — SUTURE VCRL SZ 4-0 L27IN ABSRB UD L19MM FS-2 3/8 CIR REV J422H

## (undated) DEVICE — APPLIER LIG CLP M L11IN TI STR RNG HNDL FOR 20 CLP DISP

## (undated) DEVICE — CONTAINER,SPECIMEN,PNEU TUBE,4OZ,OR STRL: Brand: MEDLINE

## (undated) DEVICE — GLOVE SURG SZ 65 THK91MIL LTX FREE SYN POLYISOPRENE

## (undated) DEVICE — GOWN,SIRUS,NON REINFRCD,LARGE,SET IN SL: Brand: MEDLINE

## (undated) DEVICE — COVER US PRB W5XL96IN LTX W/ GEL

## (undated) DEVICE — BASIC SINGLE BASIN BTC-LF: Brand: MEDLINE INDUSTRIES, INC.